# Patient Record
Sex: FEMALE | Race: WHITE | Employment: PART TIME | URBAN - NONMETROPOLITAN AREA
[De-identification: names, ages, dates, MRNs, and addresses within clinical notes are randomized per-mention and may not be internally consistent; named-entity substitution may affect disease eponyms.]

---

## 2017-01-07 DIAGNOSIS — Z30.09 FAMILY PLANNING ADVICE: ICD-10-CM

## 2017-01-09 RX ORDER — NORETHINDRONE ACETATE AND ETHINYL ESTRADIOL 1MG-20(21)
KIT ORAL
Qty: 28 TABLET | Refills: 0 | Status: SHIPPED | OUTPATIENT
Start: 2017-01-09 | End: 2017-03-11

## 2017-01-25 ENCOUNTER — OFFICE VISIT (OUTPATIENT)
Dept: PRIMARY CARE CLINIC | Age: 22
End: 2017-01-25

## 2017-01-25 VITALS
RESPIRATION RATE: 18 BRPM | DIASTOLIC BLOOD PRESSURE: 80 MMHG | SYSTOLIC BLOOD PRESSURE: 110 MMHG | WEIGHT: 215 LBS | HEART RATE: 79 BPM | HEIGHT: 63 IN | OXYGEN SATURATION: 98 % | BODY MASS INDEX: 38.09 KG/M2 | TEMPERATURE: 99.3 F

## 2017-01-25 DIAGNOSIS — R05.9 COUGH: ICD-10-CM

## 2017-01-25 DIAGNOSIS — J32.9 SINUSITIS, UNSPECIFIED CHRONICITY, UNSPECIFIED LOCATION: Primary | ICD-10-CM

## 2017-01-25 PROCEDURE — 99213 OFFICE O/P EST LOW 20 MIN: CPT | Performed by: NURSE PRACTITIONER

## 2017-01-25 RX ORDER — FLUTICASONE PROPIONATE 50 MCG
2 SPRAY, SUSPENSION (ML) NASAL DAILY
Qty: 1 BOTTLE | Refills: 0 | Status: SHIPPED | OUTPATIENT
Start: 2017-01-25 | End: 2017-10-06 | Stop reason: ALTCHOICE

## 2017-01-25 ASSESSMENT — ENCOUNTER SYMPTOMS
SORE THROAT: 1
SHORTNESS OF BREATH: 1
RHINORRHEA: 1
WHEEZING: 1
COUGH: 1

## 2017-01-31 ENCOUNTER — OFFICE VISIT (OUTPATIENT)
Dept: PRIMARY CARE CLINIC | Age: 22
End: 2017-01-31

## 2017-01-31 VITALS
SYSTOLIC BLOOD PRESSURE: 122 MMHG | OXYGEN SATURATION: 95 % | RESPIRATION RATE: 16 BRPM | TEMPERATURE: 98.8 F | DIASTOLIC BLOOD PRESSURE: 88 MMHG | HEART RATE: 101 BPM | HEIGHT: 63 IN | BODY MASS INDEX: 38.55 KG/M2 | WEIGHT: 217.6 LBS

## 2017-01-31 DIAGNOSIS — I82.4Z2 ACUTE DEEP VEIN THROMBOSIS (DVT) OF DISTAL VEIN OF LEFT LOWER EXTREMITY (HCC): Primary | ICD-10-CM

## 2017-01-31 DIAGNOSIS — M79.662 PAIN OF LEFT CALF: ICD-10-CM

## 2017-01-31 DIAGNOSIS — M79.89 SWELLING OF CALF: ICD-10-CM

## 2017-01-31 PROCEDURE — 99214 OFFICE O/P EST MOD 30 MIN: CPT | Performed by: NURSE PRACTITIONER

## 2017-01-31 ASSESSMENT — ENCOUNTER SYMPTOMS: RESPIRATORY NEGATIVE: 1

## 2017-02-03 ENCOUNTER — OFFICE VISIT (OUTPATIENT)
Dept: FAMILY MEDICINE CLINIC | Age: 22
End: 2017-02-03

## 2017-02-03 VITALS
DIASTOLIC BLOOD PRESSURE: 84 MMHG | SYSTOLIC BLOOD PRESSURE: 124 MMHG | BODY MASS INDEX: 36.25 KG/M2 | HEIGHT: 63 IN | HEART RATE: 68 BPM | WEIGHT: 204.6 LBS | RESPIRATION RATE: 20 BRPM

## 2017-02-03 DIAGNOSIS — I82.432 ACUTE DEEP VEIN THROMBOSIS (DVT) OF POPLITEAL VEIN OF LEFT LOWER EXTREMITY (HCC): Primary | ICD-10-CM

## 2017-02-03 PROCEDURE — 99214 OFFICE O/P EST MOD 30 MIN: CPT | Performed by: FAMILY MEDICINE

## 2017-02-03 ASSESSMENT — ENCOUNTER SYMPTOMS
SHORTNESS OF BREATH: 0
EYES NEGATIVE: 1
RESPIRATORY NEGATIVE: 1
ALLERGIC/IMMUNOLOGIC NEGATIVE: 1
GASTROINTESTINAL NEGATIVE: 1

## 2017-02-20 ENCOUNTER — TELEPHONE (OUTPATIENT)
Dept: FAMILY MEDICINE CLINIC | Age: 22
End: 2017-02-20

## 2017-03-01 DIAGNOSIS — M79.662 PAIN OF LEFT CALF: ICD-10-CM

## 2017-03-01 DIAGNOSIS — M79.89 SWELLING OF CALF: ICD-10-CM

## 2017-03-01 DIAGNOSIS — I82.4Z2 ACUTE DEEP VEIN THROMBOSIS (DVT) OF DISTAL VEIN OF LEFT LOWER EXTREMITY (HCC): ICD-10-CM

## 2017-03-11 ENCOUNTER — OFFICE VISIT (OUTPATIENT)
Dept: PRIMARY CARE CLINIC | Age: 22
End: 2017-03-11
Payer: MEDICAID

## 2017-03-11 VITALS
BODY MASS INDEX: 39.51 KG/M2 | DIASTOLIC BLOOD PRESSURE: 68 MMHG | WEIGHT: 223 LBS | SYSTOLIC BLOOD PRESSURE: 118 MMHG | OXYGEN SATURATION: 97 % | HEART RATE: 91 BPM | HEIGHT: 63 IN | TEMPERATURE: 98.2 F

## 2017-03-11 DIAGNOSIS — G44.201 ACUTE INTRACTABLE TENSION-TYPE HEADACHE: Primary | ICD-10-CM

## 2017-03-11 PROCEDURE — 99213 OFFICE O/P EST LOW 20 MIN: CPT | Performed by: NURSE PRACTITIONER

## 2017-03-11 ASSESSMENT — ENCOUNTER SYMPTOMS
PHOTOPHOBIA: 1
RESPIRATORY NEGATIVE: 1
BLURRED VISION: 1
NAUSEA: 1

## 2017-06-05 ENCOUNTER — OFFICE VISIT (OUTPATIENT)
Dept: PRIMARY CARE CLINIC | Age: 22
End: 2017-06-05
Payer: MEDICAID

## 2017-06-05 VITALS
HEART RATE: 62 BPM | HEIGHT: 63 IN | OXYGEN SATURATION: 98 % | TEMPERATURE: 97.9 F | BODY MASS INDEX: 39.69 KG/M2 | DIASTOLIC BLOOD PRESSURE: 70 MMHG | WEIGHT: 224 LBS | SYSTOLIC BLOOD PRESSURE: 110 MMHG

## 2017-06-05 DIAGNOSIS — R42 LIGHTHEADEDNESS: ICD-10-CM

## 2017-06-05 DIAGNOSIS — J06.9 VIRAL UPPER RESPIRATORY TRACT INFECTION: ICD-10-CM

## 2017-06-05 DIAGNOSIS — J32.4 CHRONIC PANSINUSITIS: Primary | ICD-10-CM

## 2017-06-05 PROCEDURE — 99213 OFFICE O/P EST LOW 20 MIN: CPT | Performed by: PHYSICIAN ASSISTANT

## 2017-06-05 RX ORDER — AMOXICILLIN 875 MG/1
875 TABLET, COATED ORAL 2 TIMES DAILY
Qty: 28 TABLET | Refills: 0 | Status: SHIPPED | OUTPATIENT
Start: 2017-06-05 | End: 2017-08-21 | Stop reason: ALTCHOICE

## 2017-06-05 RX ORDER — FLUCONAZOLE 200 MG/1
TABLET ORAL
Qty: 2 TABLET | Refills: 1 | Status: SHIPPED | OUTPATIENT
Start: 2017-06-05 | End: 2017-08-21 | Stop reason: ALTCHOICE

## 2017-06-05 ASSESSMENT — ENCOUNTER SYMPTOMS
NAUSEA: 1
DIARRHEA: 0
VOMITING: 0
SORE THROAT: 0
SINUS PRESSURE: 1
SINUS COMPLAINT: 1
SHORTNESS OF BREATH: 0
COUGH: 0

## 2017-06-29 ENCOUNTER — TELEPHONE (OUTPATIENT)
Dept: FAMILY MEDICINE CLINIC | Age: 22
End: 2017-06-29

## 2017-08-21 ENCOUNTER — OFFICE VISIT (OUTPATIENT)
Dept: FAMILY MEDICINE CLINIC | Age: 22
End: 2017-08-21
Payer: MEDICAID

## 2017-08-21 VITALS
HEART RATE: 68 BPM | BODY MASS INDEX: 39.69 KG/M2 | WEIGHT: 223.99 LBS | DIASTOLIC BLOOD PRESSURE: 68 MMHG | SYSTOLIC BLOOD PRESSURE: 118 MMHG | HEIGHT: 63 IN

## 2017-08-21 DIAGNOSIS — I82.4Z2 ACUTE DEEP VEIN THROMBOSIS (DVT) OF DISTAL VEIN OF LEFT LOWER EXTREMITY (HCC): ICD-10-CM

## 2017-08-21 DIAGNOSIS — L40.9 PSORIASIS: ICD-10-CM

## 2017-08-21 DIAGNOSIS — Z11.51 SCREENING FOR HUMAN PAPILLOMAVIRUS: Primary | ICD-10-CM

## 2017-08-21 DIAGNOSIS — N92.6 ABNORMAL MENSES: ICD-10-CM

## 2017-08-21 PROCEDURE — 99214 OFFICE O/P EST MOD 30 MIN: CPT | Performed by: FAMILY MEDICINE

## 2017-08-21 RX ORDER — CLOBETASOL PROPIONATE 0.5 MG/G
OINTMENT TOPICAL
Qty: 60 G | Refills: 1 | Status: SHIPPED | OUTPATIENT
Start: 2017-08-21 | End: 2018-04-16 | Stop reason: ALTCHOICE

## 2017-08-21 RX ORDER — MECLIZINE HCL 12.5 MG/1
12.5 TABLET ORAL 3 TIMES DAILY PRN
COMMUNITY
End: 2017-11-27

## 2017-08-21 ASSESSMENT — ENCOUNTER SYMPTOMS
EYES NEGATIVE: 1
SHORTNESS OF BREATH: 0
ALLERGIC/IMMUNOLOGIC NEGATIVE: 1
RESPIRATORY NEGATIVE: 1
GASTROINTESTINAL NEGATIVE: 1

## 2017-08-21 ASSESSMENT — PATIENT HEALTH QUESTIONNAIRE - PHQ9
2. FEELING DOWN, DEPRESSED OR HOPELESS: 0
SUM OF ALL RESPONSES TO PHQ9 QUESTIONS 1 & 2: 0
SUM OF ALL RESPONSES TO PHQ QUESTIONS 1-9: 0
1. LITTLE INTEREST OR PLEASURE IN DOING THINGS: 0

## 2017-08-25 ENCOUNTER — HOSPITAL ENCOUNTER (EMERGENCY)
Age: 22
Discharge: HOME | End: 2017-08-26
Attending: EMERGENCY MEDICINE
Payer: MEDICAID

## 2017-08-25 DIAGNOSIS — M79.605 PAIN OF LEFT LOWER EXTREMITY: Primary | ICD-10-CM

## 2017-08-25 PROCEDURE — 99283 EMERGENCY DEPT VISIT LOW MDM: CPT

## 2017-08-25 ASSESSMENT — PAIN DESCRIPTION - LOCATION: LOCATION: LEG

## 2017-08-25 ASSESSMENT — PAIN DESCRIPTION - PAIN TYPE: TYPE: ACUTE PAIN

## 2017-08-25 ASSESSMENT — PAIN SCALES - GENERAL: PAINLEVEL_OUTOF10: 6

## 2017-08-25 ASSESSMENT — PAIN DESCRIPTION - DESCRIPTORS: DESCRIPTORS: SHARP

## 2017-08-25 ASSESSMENT — PAIN DESCRIPTION - PROGRESSION: CLINICAL_PROGRESSION: NOT CHANGED

## 2017-08-25 ASSESSMENT — PAIN DESCRIPTION - ONSET: ONSET: ON-GOING

## 2017-08-25 ASSESSMENT — PAIN DESCRIPTION - ORIENTATION: ORIENTATION: LEFT

## 2017-08-25 ASSESSMENT — PAIN DESCRIPTION - FREQUENCY: FREQUENCY: INTERMITTENT

## 2017-08-26 VITALS
DIASTOLIC BLOOD PRESSURE: 79 MMHG | OXYGEN SATURATION: 98 % | TEMPERATURE: 97.5 F | RESPIRATION RATE: 13 BRPM | BODY MASS INDEX: 38.24 KG/M2 | SYSTOLIC BLOOD PRESSURE: 110 MMHG | HEART RATE: 66 BPM | WEIGHT: 224 LBS | HEIGHT: 64 IN

## 2017-08-26 LAB
ABSOLUTE EOS #: 0.1 K/UL (ref 0–0.4)
ABSOLUTE LYMPH #: 4.4 K/UL (ref 1–4.8)
ABSOLUTE MONO #: 0.7 K/UL (ref 0.1–1.2)
ANION GAP SERPL CALCULATED.3IONS-SCNC: 15 MMOL/L (ref 9–17)
BASOPHILS # BLD: 1 %
BASOPHILS ABSOLUTE: 0.1 K/UL (ref 0–0.2)
BUN BLDV-MCNC: 21 MG/DL (ref 6–20)
BUN/CREAT BLD: 32 (ref 9–20)
CALCIUM SERPL-MCNC: 9.6 MG/DL (ref 8.6–10.4)
CHLORIDE BLD-SCNC: 101 MMOL/L (ref 98–107)
CO2: 26 MMOL/L (ref 20–31)
CREAT SERPL-MCNC: 0.65 MG/DL (ref 0.5–0.9)
DIFFERENTIAL TYPE: NORMAL
EOSINOPHILS RELATIVE PERCENT: 1 %
GFR AFRICAN AMERICAN: >60 ML/MIN
GFR NON-AFRICAN AMERICAN: >60 ML/MIN
GFR SERPL CREATININE-BSD FRML MDRD: ABNORMAL ML/MIN/{1.73_M2}
GFR SERPL CREATININE-BSD FRML MDRD: ABNORMAL ML/MIN/{1.73_M2}
GLUCOSE BLD-MCNC: 90 MG/DL (ref 70–99)
HCT VFR BLD CALC: 44.4 % (ref 36–46)
HEMOGLOBIN: 14.7 G/DL (ref 12–16)
LYMPHOCYTES # BLD: 42 %
MCH RBC QN AUTO: 29.7 PG (ref 26–34)
MCHC RBC AUTO-ENTMCNC: 33.2 G/DL (ref 31–37)
MCV RBC AUTO: 89.2 FL (ref 80–100)
MONOCYTES # BLD: 7 %
PDW BLD-RTO: 13 % (ref 11–14.5)
PLATELET # BLD: 292 K/UL (ref 140–450)
PLATELET ESTIMATE: NORMAL
PMV BLD AUTO: 7.4 FL (ref 6–12)
POTASSIUM SERPL-SCNC: 4 MMOL/L (ref 3.7–5.3)
RBC # BLD: 4.97 M/UL (ref 4–5.2)
RBC # BLD: NORMAL 10*6/UL
SEG NEUTROPHILS: 49 %
SEGMENTED NEUTROPHILS ABSOLUTE COUNT: 5.1 K/UL (ref 1.8–7.7)
SODIUM BLD-SCNC: 142 MMOL/L (ref 135–144)
WBC # BLD: 10.5 K/UL (ref 3.5–11)
WBC # BLD: NORMAL 10*3/UL

## 2017-08-26 PROCEDURE — 96372 THER/PROPH/DIAG INJ SC/IM: CPT

## 2017-08-26 PROCEDURE — 85025 COMPLETE CBC W/AUTO DIFF WBC: CPT

## 2017-08-26 PROCEDURE — 36415 COLL VENOUS BLD VENIPUNCTURE: CPT

## 2017-08-26 PROCEDURE — 80048 BASIC METABOLIC PNL TOTAL CA: CPT

## 2017-08-26 PROCEDURE — 6360000002 HC RX W HCPCS

## 2017-08-26 RX ADMIN — ENOXAPARIN SODIUM 150 MG: 100 INJECTION SUBCUTANEOUS at 01:21

## 2017-08-26 ASSESSMENT — ENCOUNTER SYMPTOMS
VOMITING: 0
SHORTNESS OF BREATH: 0
NAUSEA: 0

## 2017-08-28 ENCOUNTER — TELEPHONE (OUTPATIENT)
Dept: FAMILY MEDICINE CLINIC | Age: 22
End: 2017-08-28

## 2017-08-28 ENCOUNTER — HOSPITAL ENCOUNTER (OUTPATIENT)
Dept: INTERVENTIONAL RADIOLOGY/VASCULAR | Age: 22
Discharge: HOME | End: 2017-08-28
Payer: MEDICAID

## 2017-08-28 DIAGNOSIS — M79.605 LEFT LEG PAIN: ICD-10-CM

## 2017-08-28 PROCEDURE — 93971 EXTREMITY STUDY: CPT

## 2017-09-05 ENCOUNTER — HOSPITAL ENCOUNTER (OUTPATIENT)
Dept: LAB | Age: 22
Discharge: HOME | End: 2017-09-05
Payer: MEDICAID

## 2017-09-05 RX ORDER — RIVAROXABAN 20 MG/1
TABLET, FILM COATED ORAL
Qty: 30 TABLET | Refills: 4 | Status: SHIPPED | OUTPATIENT
Start: 2017-09-05 | End: 2017-10-06 | Stop reason: ALTCHOICE

## 2017-09-06 ENCOUNTER — HOSPITAL ENCOUNTER (OUTPATIENT)
Dept: LAB | Age: 22
Discharge: HOME | End: 2017-09-06
Payer: MEDICAID

## 2017-09-06 DIAGNOSIS — I82.419 DVT FEMORAL (DEEP VENOUS THROMBOSIS) WITH THROMBOPHLEBITIS, UNSPECIFIED LATERALITY (HCC): Primary | ICD-10-CM

## 2017-09-06 DIAGNOSIS — I82.419 DEEP VEIN THROMBOSIS (DVT) OF FEMORAL VEIN, UNSPECIFIED CHRONICITY, UNSPECIFIED LATERALITY (HCC): ICD-10-CM

## 2017-09-06 DIAGNOSIS — I82.419 DEEP VEIN THROMBOSIS (DVT) OF FEMORAL VEIN, UNSPECIFIED CHRONICITY, UNSPECIFIED LATERALITY (HCC): Primary | ICD-10-CM

## 2017-09-06 DIAGNOSIS — I82.419 DVT FEMORAL (DEEP VENOUS THROMBOSIS) WITH THROMBOPHLEBITIS, UNSPECIFIED LATERALITY (HCC): ICD-10-CM

## 2017-09-06 PROCEDURE — 85306 CLOT INHIBIT PROT S FREE: CPT

## 2017-09-06 PROCEDURE — 81291 MTHFR GENE: CPT

## 2017-09-06 PROCEDURE — 86148 ANTI-PHOSPHOLIPID ANTIBODY: CPT

## 2017-09-06 PROCEDURE — 36415 COLL VENOUS BLD VENIPUNCTURE: CPT

## 2017-09-06 PROCEDURE — 85610 PROTHROMBIN TIME: CPT

## 2017-09-06 PROCEDURE — 85730 THROMBOPLASTIN TIME PARTIAL: CPT

## 2017-09-06 PROCEDURE — 81241 F5 GENE: CPT

## 2017-09-06 PROCEDURE — 86147 CARDIOLIPIN ANTIBODY EA IG: CPT

## 2017-09-06 PROCEDURE — 85303 CLOT INHIBIT PROT C ACTIVITY: CPT

## 2017-09-06 PROCEDURE — 85613 RUSSELL VIPER VENOM DILUTED: CPT

## 2017-09-06 PROCEDURE — 86146 BETA-2 GLYCOPROTEIN ANTIBODY: CPT

## 2017-09-08 ENCOUNTER — HOSPITAL ENCOUNTER (OUTPATIENT)
Age: 22
Setting detail: SPECIMEN
Discharge: HOME | End: 2017-09-08
Payer: MEDICAID

## 2017-09-08 LAB
ANTI B2-GLYCOPROTEIN IGG: 0 SGU (ref 0–20)
ANTI B2-GLYCOPROTEIN IGM: 2 SMU (ref 0–20)
ANTICARDIOLIPIN IGA ANTIBODY: 1.6 APU
ANTICARDIOLIPIN IGG ANTIBODY: 8.2 GPU
CARDIOLIPIN AB IGM: 5.8 MPU
CREATININE URINE: 294.7 MG/DL (ref 28–217)
DILUTE RUSSELL VIPER VENOM TIME: NORMAL
INR BLD: 1
LUPUS ANTICOAG: NORMAL
MICROALBUMIN/CREAT 24H UR: 43 MG/L
MICROALBUMIN/CREAT UR-RTO: 15 MCG/MG CREAT
PARTIAL THROMBOPLASTIN TIME: 23.7 SEC (ref 21.3–31.3)
PROTEIN C ACTIVITY: >150 %
PROTEIN S ACTIVITY: 117 % (ref 59–130)
PROTHROMBIN TIME: 10.3 SEC (ref 9.4–12.6)

## 2017-09-08 PROCEDURE — 82043 UR ALBUMIN QUANTITATIVE: CPT

## 2017-09-08 PROCEDURE — 82570 ASSAY OF URINE CREATININE: CPT

## 2017-09-09 LAB
ANTIPHOSPHATIDYLSERINE IGA ANTIBODY: 0 U/ML (ref 0–19)
ANTIPHOSPHATIDYLSERINE IGG ANTIBODY: 1 U/ML (ref 0–10)
ANTIPHOSPHATIDYLSERINE IGM ANTIBODY: 6 U/ML (ref 0–24)

## 2017-09-15 LAB
FACTOR V LEIDEN MUTATION: NORMAL
MTHFR MUTATION 677T/A1298C: NORMAL

## 2017-10-02 ENCOUNTER — TELEPHONE (OUTPATIENT)
Dept: FAMILY MEDICINE CLINIC | Age: 22
End: 2017-10-02

## 2017-10-02 NOTE — TELEPHONE ENCOUNTER
Pt calling stating she was notified of lab results but was told we would get back with her with more information regarding the results, pt also questioning if all results are back, please advise pt at above number.

## 2017-10-02 NOTE — TELEPHONE ENCOUNTER
Heterozygous for the MTHFR  Mutation , otherwise hypercoagulable work up negative. Not a reason to consider lifelong anticoagulation , but slightly higher risk for blood clots. May take asa 81mg/day.

## 2017-10-04 ENCOUNTER — HOSPITAL ENCOUNTER (EMERGENCY)
Age: 22
Discharge: HOME | End: 2017-10-04
Attending: EMERGENCY MEDICINE
Payer: MEDICAID

## 2017-10-04 VITALS
BODY MASS INDEX: 38.27 KG/M2 | WEIGHT: 216 LBS | HEIGHT: 63 IN | RESPIRATION RATE: 18 BRPM | TEMPERATURE: 98.2 F | OXYGEN SATURATION: 99 % | DIASTOLIC BLOOD PRESSURE: 78 MMHG | SYSTOLIC BLOOD PRESSURE: 133 MMHG | HEART RATE: 69 BPM

## 2017-10-04 DIAGNOSIS — L50.9 URTICARIA: Primary | ICD-10-CM

## 2017-10-04 PROCEDURE — 6370000000 HC RX 637 (ALT 250 FOR IP): Performed by: EMERGENCY MEDICINE

## 2017-10-04 PROCEDURE — 6360000002 HC RX W HCPCS: Performed by: EMERGENCY MEDICINE

## 2017-10-04 PROCEDURE — 99282 EMERGENCY DEPT VISIT SF MDM: CPT

## 2017-10-04 PROCEDURE — 96372 THER/PROPH/DIAG INJ SC/IM: CPT

## 2017-10-04 RX ORDER — FAMOTIDINE 20 MG/1
20 TABLET, FILM COATED ORAL ONCE
Status: COMPLETED | OUTPATIENT
Start: 2017-10-04 | End: 2017-10-04

## 2017-10-04 RX ORDER — RANITIDINE 150 MG/1
150 TABLET ORAL 2 TIMES DAILY
Qty: 10 TABLET | Refills: 0 | Status: SHIPPED | OUTPATIENT
Start: 2017-10-04 | End: 2018-04-23

## 2017-10-04 RX ORDER — PREDNISONE 20 MG/1
40 TABLET ORAL ONCE
Status: COMPLETED | OUTPATIENT
Start: 2017-10-04 | End: 2017-10-04

## 2017-10-04 RX ORDER — DIPHENHYDRAMINE HYDROCHLORIDE 50 MG/ML
50 INJECTION INTRAMUSCULAR; INTRAVENOUS ONCE
Status: COMPLETED | OUTPATIENT
Start: 2017-10-04 | End: 2017-10-04

## 2017-10-04 RX ORDER — DIPHENHYDRAMINE HCL 25 MG
50 CAPSULE ORAL EVERY 6 HOURS PRN
Qty: 20 CAPSULE | Refills: 0 | Status: SHIPPED | OUTPATIENT
Start: 2017-10-04 | End: 2017-10-08

## 2017-10-04 RX ORDER — PREDNISONE 10 MG/1
TABLET ORAL
Qty: 20 TABLET | Refills: 0 | Status: SHIPPED | OUTPATIENT
Start: 2017-10-04 | End: 2017-10-14

## 2017-10-04 RX ADMIN — FAMOTIDINE 20 MG: 20 TABLET ORAL at 21:22

## 2017-10-04 RX ADMIN — DIPHENHYDRAMINE HYDROCHLORIDE 50 MG: 50 INJECTION, SOLUTION INTRAMUSCULAR; INTRAVENOUS at 21:23

## 2017-10-04 RX ADMIN — PREDNISONE 40 MG: 20 TABLET ORAL at 21:22

## 2017-10-04 ASSESSMENT — ENCOUNTER SYMPTOMS
SHORTNESS OF BREATH: 0
NAUSEA: 0
VOMITING: 0

## 2017-10-04 NOTE — ED AVS SNAPSHOT
After Visit Summary  (Discharge Instructions)    Medication List for Home    Based on the information you provided to us as well as any changes during this visit, the following is your updated medication list.  Compare this with your prescription bottles at home. If you have any questions or concerns, contact your primary care physician's office. Daily Medication List (This medication list can be shared with any Healthcare provider who is helping you manage your medications)      There are NEW medications for you. START taking them after you leave the hospital     diphenhydrAMINE 25 MG capsule   Commonly known as:  BENADRYL   Take 2 capsules by mouth every 6 hours as needed for Itching       predniSONE 10 MG tablet   Commonly known as:  DELTASONE   Take 4 tablets by mouth once daily for 5 days       ranitidine 150 MG tablet   Commonly known as:  ZANTAC   Take 1 tablet by mouth 2 times daily for 5 days         ASK your doctor about these medications if you have questions     aspirin 81 MG tablet   Take 81 mg by mouth daily       clobetasol 0.05 % ointment   Commonly known as:  TEMOVATE   Apply topically 2 times daily to affected area.        fluticasone 50 MCG/ACT nasal spray   Commonly known as:  FLONASE   2 sprays by Nasal route daily       meclizine 12.5 MG tablet   Commonly known as:  ANTIVERT   Take 12.5 mg by mouth 3 times daily as needed       XARELTO 20 MG Tabs tablet   Generic drug:  rivaroxaban   TAKE ONE TABLET BY MOUTH DAILY            Where to Get Your Medications      You can get these medications from any pharmacy     Bring a paper prescription for each of these medications     diphenhydrAMINE 25 MG capsule    predniSONE 10 MG tablet    ranitidine 150 MG tablet               Allergies as of 10/4/2017        Reactions    Ibuprofen Nausea And Vomiting      Immunizations as of 10/4/2017     Name Date Dose VIS Date Route    DTaP Vaccine 5/7/1999 -- -- -- Here you will find information about your visit, including the reason for your visit. Please take this sheet with you when you visit your doctor or other health care provider in the future. It will help determine the best possible medical care for you at that time. If you have any questions once you leave the hospital, please call the department phone number listed below. Diagnoses this visit     Your diagnosis was URTICARIA. Visit Information     Date of Visit Department Dept Phone    10/4/2017 [facility] [phone]      You were seen by     You were seen by Manpreet Harvey MD.       Follow-up Appointments    Below is a list of your follow-up and future appointments. This may not be a complete list as you may have made appointments directly with providers that we are not aware of or your providers may have made some for you. Please call your providers to confirm appointments. It is important to keep your appointments. Please bring your current insurance card, photo ID, co-pay, and all medication bottles to your appointment. If self-pay, payment is expected at the time of service. Follow-up Information     Follow up with Macario Rao MD In 2 days. Specialty:  Family Medicine    Contact information:    [address] [address]  [phone]        Future Appointments     11/27/2017 9:00 AM     Appointment with [address], CNP at [address] [facility] ([phone])   [place] [address]         Preventive Care        Date Due    Tetanus Combination Vaccine (7 - Td) 7/11/2016    Pap Smear 11/11/2016    Yearly Flu Vaccine (1) 11/30/2017 (Originally 9/1/2017)    HIV screening is recommended for all people regardless of risk factors  aged 15-65 years at least once (lifetime) who have never been HIV tested.  12/31/2099 (Originally 5/18/2008)                 Care Plan Once You Return Home This section includes instructions you will need to follow once you leave the hospital.  Your care team will discuss these with you, so you and those caring for you know how to best care for your health needs at home. This section may also include educational information about certain health topics that may be of help to you. Important Information if you smoke or are exposed to smoking       SMOKING: QUIT SMOKING. THIS IS THE MOST IMPORTANT ACTION YOU CAN TAKE TO IMPROVE YOUR CURRENT AND FUTURE HEALTH. Call the [facility] at [place] NOW ([phone])    Smoking harms nonsmokers. When nonsmokers are around people who smoke, they absorb nicotine, carbon monoxide, and other ingredients of tobacco smoke. DO NOT SMOKE AROUND CHILDREN     Children exposed to secondhand smoke are at an increased risk of:  Sudden Infant Death Syndrome (SIDS), acute respiratory infections, inflammation of the middle ear, and severe asthma. Over a longer time, it causes heart disease and lung cancer. There is no safe level of exposure to secondhand smoke. [employer] Signup     Our records indicate that you have an active [employer] account. You can view your After Visit Summary by going to [email or web address] and logging in with your [employer] username and password. If you don't have a [employer] username and password but a parent or guardian has access to your record, the parent or guardian should login with their own [employer] username and password and access your record to view the After Visit Summary. Additional Information  If you have questions, please contact the physician practice where you receive care. Remember, [employer] is NOT to be used for urgent needs. For medical emergencies, dial 911. For questions regarding your [employer] account call [phone]. If you have a clinical question, please call your doctor's office. View your information online  ? Review your current list of  medications, immunization, and allergies. ? Review your future test results online . ? Review your discharge instructions provided by your caregivers at discharge    Certain functionality such as prescription refills, scheduling appointments or sending messages to your provider are not activated if your provider does not use [facility] in his/her office    For questions regarding your MyChart account call [phone]. If you have a clinical question, please call your doctor's office. The information on all pages of the After Visit Summary has been reviewed with me, the patient and/or responsible adult, by my health care provider(s). I had the opportunity to ask questions regarding this information. I understand I should dispose of my armband safely at home to protect my health information. A complete copy of the After Visit Summary has been given to me, the patient and/or responsible adult. Patient Signature/Responsible Adult: ___________________________________    Nurse Signature: ___________________________________  Resident/MLP Signature: ___________________________________  Attending Signature: ___________________________________    Date:____________Time:____________              Discharge Instructions       Please follow up with her family doctor within 2 days. Please avoid known allergens. Medications as prescribed. Do not drive while taking diphenhydramine. Return to the emergency department immediately for difficulty breathing or swallowing, swelling of the lips or tongue or face, hives on the face or neck, fever over 101 degrees, or any other concerns. Hives: Care Instructions  Your Care Instructions  Hives are raised, red, itchy patches of skin. They are also called wheals or welts. They usually have red borders and pale centers.  Hives range in size from ¼ inch to 3 inches or more across. They may seem to move from place to place on the skin. Several hives may form a large area of raised, red skin. You can get hives after an insect sting, after taking medicine or eating certain foods, or because of infection or stress. Other causes include plants, things you breathe in, makeup, heat, cold, sunlight, and latex. You cannot spread hives to other people. Hives may last a few minutes or a few days, but a single spot may last less than 36 hours. Follow-up care is a key part of your treatment and safety. Be sure to make and go to all appointments, and call your doctor if you are having problems. It's also a good idea to know your test results and keep a list of the medicines you take. How can you care for yourself at home? · Avoid whatever you think may have caused your hives, such as a certain food or medicine. However, you may not know the cause. · Put a cool, wet towel on the area to relieve itching. · Take an over-the-counter antihistamine, such as diphenhydramine (Benadryl), cetirizine (Zyrtec), or loratadine (Claritin), to help stop the hives and calm the itching. Read and follow directions on the label. These medicines can make you feel sleepy. Do not drive while using them. · Stay away from strong soaps, detergents, and chemicals. These can make itching worse. When should you call for help? Call 911 anytime you think you may need emergency care. For example, call if:  · You have symptoms of a severe allergic reaction. These may include:  ¨ Sudden raised, red areas (hives) all over your body. ¨ Swelling of the throat, mouth, lips, or tongue. ¨ Trouble breathing. ¨ Passing out (losing consciousness). Or you may feel very lightheaded or suddenly feel weak, confused, or restless. Call your doctor now or seek immediate medical care if:  · You have symptoms of an allergic reaction, such as:  ¨ A rash or hives (raised, red areas on the skin).

## 2017-10-05 NOTE — ED PROVIDER NOTES
[facility] ED  [address] [facility]  Phone: [phone]  eMERGENCY dEPARTMENT eNCOUnter      Pt Name: Yi Ascencio  MRN: [id number]  [place] 1995  Date of evaluation: 10/4/17      CHIEF COMPLAINT       Chief Complaint   Patient presents with    Urticaria     on bilateral arms and chest x 1 day. Reports using a new laundry soap recently. Took benadryl PTA         HISTORY OF PRESENT ILLNESS    Yi Ascencio is a 23 y.o. female who presents Today with complaints of urticaria that started this morning on her chest back abdomen. She recently changed her laundry detergent from gained all. 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 She denies any difficulty breathing or swallowing no chest pain or shortness of breath. She took some generic diphenhydramine prior to arrival.    REVIEW OF SYSTEMS     Review of Systems   Constitutional: Negative for fever. HENT: Negative for congestion. Respiratory: Negative for shortness of breath. Cardiovascular: Negative for chest pain. Gastrointestinal: Negative for nausea and vomiting. Skin: Positive for rash. Neurological: Negative for syncope. Psychiatric/Behavioral: Negative for confusion. All other systems reviewed and are negative. PAST MEDICAL HISTORY    has a past medical history of Acne; Anisocoria; Chickenpox; DVT (deep venous thrombosis) ([address] Obesity; Psoriasis; Seasonal allergies; Social anxiety disorder; and Swollen tonsil. SURGICAL HISTORY      has a past surgical history that includes [place] tooth extraction; Endoscopic ultrasonography, GI; Colonoscopy (2010); Upper gastrointestinal endoscopy (2010); and ovarian cyst removal (Right, 09/27/2016).     CURRENT MEDICATIONS       Previous Medications    ASPIRIN 81 MG TABLET    Take 81 mg by mouth daily    CLOBETASOL (TEMOVATE) intact distal pulses. No murmur heard. Pulmonary/Chest: Effort normal and breath sounds normal. No respiratory distress. She has no wheezes. She has no rales. Abdominal: Soft. There is no tenderness. There is no rebound and no guarding. Musculoskeletal: Normal range of motion. She exhibits no edema or tenderness. Neurological: She is alert and oriented to person, place, and time. No cranial nerve deficit. Skin: Skin is warm and dry. Rash noted. She is not diaphoretic. Diffuse maculopapular rash over the chest abdomen and back that is blanching consistent with urticaria. Psychiatric: She has a normal mood and affect. Her behavior is normal.   Vitals reviewed. DIFFERENTIAL DIAGNOSIS / MDM / EMERGENCY DEPARTMENT COURSE:     This appears to be a allergic reaction. There is no airway or breathing component. The patient is agreeable to injectable diphenhydramine. She prefers oral steroids and H2 blocker. We will observe the patient to make sure he is not worsening and improves prior to discharge. She will be discharged on a five-day course of 40 mg of prednisone and Pepcid and diphenhydramine. she was advised to change back to the original laundry detergent and to avoid future known allergens. 9:54 PM: patient is reassessed and is starting to feel improved. She wishes to go home. I educated on the warning signs which should return to the emergency department. DIAGNOSTIC RESULTS     EKG: All EKG's are interpreted by the Emergency Department Physician who either signs or Co-signs this chart in the absence of a cardiologist.        RADIOLOGY:   I directly visualized the following plain film images and reviewed the radiologist interpretations of radiologic studies:    No orders to display        No results found. LABS:  No results found for this visit on 10/04/17.       EMERGENCY DEPARTMENT COURSE:   Vitals:    Vitals:    10/04/17 2105   BP: 133/78   Pulse: 69   Resp: 18   Temp: 98.2 °F (36.8 °C)   TempSrc: Tympanic   SpO2: 99%   Weight: 216 lb (98 kg)   Height: 5' 3\" (1.6 m)     -------------------------  BP: 133/78, Temp: 98.2 °F (36.8 °C), Pulse: 69, Resp: 18      CONSULTS:  None    PROCEDURES:  None    FINAL IMPRESSION      1.  Urticaria          DISPOSITION/PLAN   DISPOSITION Decision to Discharge    PATIENT REFERRED TO:  Trini Tadeo MD  [address] [place], [place] [id number]    In 2 days        DISCHARGE MEDICATIONS:  New Prescriptions    DIPHENHYDRAMINE (BENADRYL) 25 MG CAPSULE    Take 2 capsules by mouth every 6 hours as needed for Itching    PREDNISONE (DELTASONE) 10 MG TABLET    Take 4 tablets by mouth once daily for 5 days    RANITIDINE (ZANTAC) 150 MG TABLET    Take 1 tablet by mouth 2 times daily for 5 days       (Please note that portions of this note were completed with a voice recognition program.  Efforts were made to edit the dictations but occasionally words are mis-transcribed.)    Ana Cristina Wang MD, [address] [facility]  Attending Emergency Medicine Physician       Ana Cristina Wang MD  10/04/17 [id number]

## 2017-10-06 ENCOUNTER — HOSPITAL ENCOUNTER (EMERGENCY)
Age: 22
Discharge: HOME | End: 2017-10-06
Attending: EMERGENCY MEDICINE
Payer: MEDICAID

## 2017-10-06 ENCOUNTER — HOSPITAL ENCOUNTER (EMERGENCY)
Age: 22
Discharge: HOME | End: 2017-10-06
Attending: SPECIALIST
Payer: MEDICAID

## 2017-10-06 VITALS
WEIGHT: 216 LBS | SYSTOLIC BLOOD PRESSURE: 138 MMHG | RESPIRATION RATE: 12 BRPM | HEART RATE: 88 BPM | OXYGEN SATURATION: 97 % | TEMPERATURE: 97.3 F | HEIGHT: 63 IN | BODY MASS INDEX: 38.27 KG/M2 | DIASTOLIC BLOOD PRESSURE: 79 MMHG

## 2017-10-06 VITALS
DIASTOLIC BLOOD PRESSURE: 90 MMHG | HEART RATE: 70 BPM | BODY MASS INDEX: 38.27 KG/M2 | TEMPERATURE: 98.2 F | OXYGEN SATURATION: 98 % | WEIGHT: 216 LBS | HEIGHT: 63 IN | RESPIRATION RATE: 16 BRPM | SYSTOLIC BLOOD PRESSURE: 140 MMHG

## 2017-10-06 DIAGNOSIS — T50.905A MEDICATION SIDE EFFECT, INITIAL ENCOUNTER: Primary | ICD-10-CM

## 2017-10-06 DIAGNOSIS — L50.9 URTICARIA: Primary | ICD-10-CM

## 2017-10-06 PROCEDURE — 96372 THER/PROPH/DIAG INJ SC/IM: CPT

## 2017-10-06 PROCEDURE — 6370000000 HC RX 637 (ALT 250 FOR IP): Performed by: SPECIALIST

## 2017-10-06 PROCEDURE — 6360000002 HC RX W HCPCS: Performed by: SPECIALIST

## 2017-10-06 PROCEDURE — 99283 EMERGENCY DEPT VISIT LOW MDM: CPT

## 2017-10-06 PROCEDURE — 6360000002 HC RX W HCPCS: Performed by: EMERGENCY MEDICINE

## 2017-10-06 RX ORDER — METHYLPREDNISOLONE SODIUM SUCCINATE 125 MG/2ML
125 INJECTION, POWDER, LYOPHILIZED, FOR SOLUTION INTRAMUSCULAR; INTRAVENOUS ONCE
Status: COMPLETED | OUTPATIENT
Start: 2017-10-06 | End: 2017-10-06

## 2017-10-06 RX ORDER — DIPHENHYDRAMINE HYDROCHLORIDE 50 MG/ML
50 INJECTION INTRAMUSCULAR; INTRAVENOUS ONCE
Status: COMPLETED | OUTPATIENT
Start: 2017-10-06 | End: 2017-10-06

## 2017-10-06 RX ORDER — FAMOTIDINE 20 MG/1
20 TABLET, FILM COATED ORAL 2 TIMES DAILY
Qty: 10 TABLET | Refills: 0 | Status: SHIPPED | OUTPATIENT
Start: 2017-10-06 | End: 2018-04-23

## 2017-10-06 RX ORDER — FAMOTIDINE 20 MG/1
20 TABLET, FILM COATED ORAL ONCE
Status: COMPLETED | OUTPATIENT
Start: 2017-10-06 | End: 2017-10-06

## 2017-10-06 RX ADMIN — DIPHENHYDRAMINE HYDROCHLORIDE 50 MG: 50 INJECTION, SOLUTION INTRAMUSCULAR; INTRAVENOUS at 03:46

## 2017-10-06 RX ADMIN — EPINEPHRINE 0.3 MG: 1 INJECTION INTRAMUSCULAR; INTRAVENOUS; SUBCUTANEOUS at 12:35

## 2017-10-06 RX ADMIN — FAMOTIDINE 20 MG: 20 TABLET ORAL at 03:42

## 2017-10-06 RX ADMIN — METHYLPREDNISOLONE SODIUM SUCCINATE 125 MG: 125 INJECTION, POWDER, FOR SOLUTION INTRAMUSCULAR; INTRAVENOUS at 03:46

## 2017-10-06 ASSESSMENT — ENCOUNTER SYMPTOMS
COLOR CHANGE: 1
SHORTNESS OF BREATH: 0
WHEEZING: 0

## 2017-10-06 NOTE — ED AVS SNAPSHOT
problems. It's also a good idea to know your test results and keep a list of the medicines you take. How can you care for yourself at home? · Be safe with medicines. Take your medicines exactly as prescribed. Call your doctor if you think you are having a problem with your medicine. · Call your doctor if side effects bother you and you wonder if you should keep taking a medicine. Your doctor may be able to lower your dose or change your medicine. Do not suddenly quit taking your medicine unless a doctor tells you to. · Make sure your doctor has a list of all the medicines, vitamins, supplements, and herbal remedies you take. Ask about side effects. When should you call for help? Watch closely for changes in your health, and be sure to contact your doctor if:  · You think you are having a new problem with your medicine. · You do not get better as expected. Where can you learn more? Go to [email or web address] org and sign in to your [employer] account. Enter D152 in the [employer] box to learn more about \"Side Effects of Medicine: Care Instructions. \"     If you do not have an account, please click on the \"Sign Up Now\" link. Current as of: March 24, 2017  Content Version: 11.3  © 5525-9301 [employer]. Care instructions adapted under license by [facility] ([facility]). If you have questions about a medical condition or this instruction, always ask your healthcare professional. [place] [address] any warranty or liability for your use of this information. Continue antihistamines and steroids as directed. See your doctor to follow-up. Return for difficulty breathing or swallowing, worsening hives, swelling, or if worse in any way.

## 2017-10-06 NOTE — ED AVS SNAPSHOT
After Visit Summary  (Discharge Instructions)    Medication List for Home    Based on the information you provided to us as well as any changes during this visit, the following is your updated medication list.  Compare this with your prescription bottles at home. If you have any questions or concerns, contact your primary care physician's office. Daily Medication List (This medication list can be shared with any Healthcare provider who is helping you manage your medications)      There are NEW medications for you. START taking them after you leave the hospital     famotidine 20 MG tablet   Commonly known as:  PEPCID   Take 1 tablet by mouth 2 times daily for 10 doses         These are medications you told us you were taking at home, CONTINUE taking them after you leave the hospital     aspirin 81 MG tablet   Take 81 mg by mouth daily       clobetasol 0.05 % ointment   Commonly known as:  TEMOVATE   Apply topically 2 times daily to affected area.        diphenhydrAMINE 25 MG capsule   Commonly known as:  BENADRYL   Take 2 capsules by mouth every 6 hours as needed for Itching       meclizine 12.5 MG tablet   Commonly known as:  ANTIVERT   Take 12.5 mg by mouth 3 times daily as needed       predniSONE 10 MG tablet   Commonly known as:  DELTASONE   Take 4 tablets by mouth once daily for 5 days       ranitidine 150 MG tablet   Commonly known as:  ZANTAC   Take 1 tablet by mouth 2 times daily for 5 days            Where to Get Your Medications      You can get these medications from any pharmacy     Bring a paper prescription for each of these medications     famotidine 20 MG tablet               Allergies as of 10/6/2017        Reactions    Ibuprofen Nausea And Vomiting      Immunizations as of 10/6/2017     Name Date Dose VIS Date Route    DTaP Vaccine 5/7/1999 -- -- --    DTaP Vaccine 11/18/1994 -- -- --    DTaP Vaccine 1993 -- -- --    DTaP Vaccine 1993 -- -- -- DTaP Vaccine 1993 -- -- --    HPV Gardasil 9-valent 2/10/2011 -- -- --    HPV Gardasil 9-valent 10/11/2010 -- -- --    HPV Gardasil 9-valent 8/10/2010 -- -- --    Hepatitis B (Engerix-B) 3/21/1994 -- -- --    Hepatitis B (Engerix-B) 1993 -- -- --    Hepatitis B (Engerix-B) 1993 -- -- --    Hib PRP-OMP (PedvaxHIB) 1994 -- -- --    Hib PRP-OMP (PedvaxHIB) 1993 -- -- --    Hib PRP-OMP (PedvaxHIB) 1993 -- -- --    Hib PRP-OMP (PedvaxHIB) 1993 -- -- --    IPV (Ipol) 1999 -- -- --    IPV (Ipol) 1994 -- -- --    IPV (Ipol) 1993 -- -- --    IPV (Ipol) 1993 -- -- --    MMR 1999 -- -- --    MMR 1994 -- -- --    Tdap (Boostrix, Adacel) 2006 -- -- --         After Visit Summary    This summary was created for you. Thank you for entrusting your care to us. The following information includes details about your hospital/visit stay along with steps you should take to help with your recovery once you leave the hospital.  In this packet, you will find information about the topics listed below:    · Instructions about your medications including a list of your home medications  · A summary of your hospital visit  · Follow-up appointments once you have left the hospital  · Your care plan at home      You may receive a survey regarding the care you received during your stay. Your input is valuable to us. We encourage you to complete and return your survey in the envelope provided. We hope you will choose us in the future for your healthcare needs. Patient Information     Patient Name JUAN Poe 1995      Care Provided at:     Name Address Phone       [facility] [address] [place] [phone]            Your Visit    Here you will find information about your visit, including the reason for your visit.   Please take this sheet with you when you visit your doctor or other health care provider in the future. It will help determine the best possible medical care for you at that time. If you have any questions once you leave the hospital, please call the department phone number listed below. Diagnoses this visit     Your diagnosis was URTICARIA. Visit Information     Date of Visit Department Dept Phone    10/6/2017 [facility] [phone]      You were seen by     You were seen by Tha Mujica MD.       Follow-up Appointments    Below is a list of your follow-up and future appointments. This may not be a complete list as you may have made appointments directly with providers that we are not aware of or your providers may have made some for you. Please call your providers to confirm appointments. It is important to keep your appointments. Please bring your current insurance card, photo ID, co-pay, and all medication bottles to your appointment. If self-pay, payment is expected at the time of service. Follow-up Information     Follow up with Maite Holt MD. Call in 2 days. Specialty:  Family Medicine    Why:  For reevaluation of current symptoms    Contact information:    [address] [place]  [phone]          Follow up with [facility] ED. Specialty:  Emergency Medicine    Why:  If symptoms worsen    Contact information:    [address]  [phone]      Future Appointments     11/27/2017 9:00 AM     Appointment with Romina Bennett CNP at [address] [facility] ([phone])   [facility]         Preventive Care        Date Due    Tetanus Combination Vaccine (7 - Td) 7/11/2016    Pap Smear 11/11/2016    Yearly Flu Vaccine (1) 11/30/2017 (Originally 9/1/2017)    HIV screening is recommended for all people regardless of risk factors  aged 15-65 years at least once (lifetime) who have never been HIV tested.  12/31/2099 (Originally 5/18/2008) Care Plan Once You Return Home    This section includes instructions you will need to follow once you leave the hospital.  Your care team will discuss these with you, so you and those caring for you know how to best care for your health needs at home. This section may also include educational information about certain health topics that may be of help to you. Important Information if you smoke or are exposed to smoking       SMOKING: QUIT SMOKING. THIS IS THE MOST IMPORTANT ACTION YOU CAN TAKE TO IMPROVE YOUR CURRENT AND FUTURE HEALTH. Call the [facility] [place] NOW ([phone])    Smoking harms nonsmokers. When nonsmokers are around people who smoke, they absorb nicotine, carbon monoxide, and other ingredients of tobacco smoke. DO NOT SMOKE AROUND CHILDREN     Children exposed to secondhand smoke are at an increased risk of:  Sudden Infant Death Syndrome (SIDS), acute respiratory infections, inflammation of the middle ear, and severe asthma. Over a longer time, it causes heart disease and lung cancer. There is no safe level of exposure to secondhand smoke. Tripware Signup     Our records indicate that you have an active Tripware account. You can view your After Visit Summary by going to [email or web address] and logging in with your Tripware username and password. If you don't have a Tripware username and password but a parent or guardian has access to your record, the parent or guardian should login with their own Tripware username and password and access your record to view the After Visit Summary. Additional Information  If you have questions, please contact the physician practice where you receive care. Remember, Tripware is NOT to be used for urgent needs. For medical emergencies, dial 911. For questions regarding your Tripware account call [phone].  If you have a clinical question, please call your doctor's office. View your information online  ? Review your current list of  medications, immunization, and allergies. ? Review your future test results online . ? Review your discharge instructions provided by your caregivers at discharge    Certain functionality such as prescription refills, scheduling appointments or sending messages to your provider are not activated if your provider does not use CarePATH in his/her office    For questions regarding your MyChart account call [phone]. If you have a clinical question, please call your doctor's office. The information on all pages of the After Visit Summary has been reviewed with me, the patient and/or responsible adult, by my health care provider(s). I had the opportunity to ask questions regarding this information. I understand I should dispose of my armband safely at home to protect my health information. A complete copy of the After Visit Summary has been given to me, the patient and/or responsible adult. Patient Signature/Responsible Adult: ___________________________________    Nurse Signature: ___________________________________  Resident/MLP Signature: ___________________________________  Attending Signature: ___________________________________    Date:____________Time:____________              Discharge Instructions            Hives: Care Instructions  Your Care Instructions  Hives are raised, red, itchy patches of skin. They are also called wheals or welts. They usually have red borders and pale centers. Hives range in size from ¼ inch to 3 inches or more across. They may seem to move from place to place on the skin. Several hives may form a large area of raised, red skin. You can get hives after an insect sting, after taking medicine or eating certain foods, or because of infection or stress.  Other causes include Go to [email or web address] and sign in to your My[employer] Health[facility] account. Enter W260 in the [place] [employer] [place] box to learn more about \"Hives: Care Instructions. \"     If you do not have an account, please click on the \"Sign Up Now\" link. Current as of: March 20, 2017  Content Version: 11.3  © 4988-4011 [employer], [employer]. Care instructions adapted under license by [facility] ([facility]). If you have questions about a medical condition or this instruction, always ask your healthcare professional. [address] any warranty or liability for your use of this information.

## 2017-10-06 NOTE — ED PROVIDER NOTES
10/06/17 0317   BP: (!) 142/90   Pulse: 70   Resp: 18   Temp: 98.2 °F (36.8 °C)   TempSrc: Tympanic   SpO2: 98%   Weight: 216 lb (98 kg)   Height: 5' 3\" (1.6 m)     -------------------------  BP: (!) 142/90, Temp: 98.2 °F (36.8 °C), Pulse: 70, Resp: 18    Orders Placed This Encounter   Medications    methylPREDNISolone sodium (SOLU-MEDROL) injection 125 mg    famotidine (PEPCID) tablet 20 mg    diphenhydrAMINE (BENADRYL) injection 50 mg    famotidine (PEPCID) 20 MG tablet     Sig: Take 1 tablet by mouth 2 times daily for 10 doses     Dispense:  10 tablet     Refill:  0       During emergency department course, patient was given Solu-Medrol 125 mg and Benadryl 50 mg intramuscularly and Pepcid 20 mg orally. She is feeling much better and resting comfortably. Plan is to discharge the patient on Pepcid 20 mg twice daily for 5 days, continue prednisone as prescribed yesterday, take Benadryl 50 mg every 6 hours for itching, plenty of oral fluids, follow up with PCP, return if worse. She was advised to avoid re-exposure to dryer sheets she is allergic to. I have reviewed the disposition diagnosis with the patient and or their family/guardian. I have answered their questions and given discharge instructions. They voiced understanding of these instructions and did not have any further questions or complaints. Re-evaluation Notes    Upon reevaluation, patient is resting comfortably and does not appear to be in any pain or distress. Lip edema is resolved and itching is resolved. Rash is fading away. Patient prefers to go home. CRITICAL CARE:   None        CONSULTS:      PROCEDURES:  None    FINAL IMPRESSION      1.  Urticaria          DISPOSITION/PLAN   DISPOSITION Decision To Discharge    Condition on Disposition    stable    PATIENT REFERRED TO:  Damaris Martinez, [facility] [address] [phone]    Call in 2 days  For reevaluation of current symptoms    [facility]

## 2017-10-06 NOTE — ED NOTES
Discharged home with significant other. Verbalized understanding of taking medications regularly to decrease symptoms. Hussein;l follow with pmd or return if worse.      Jim Freedman RN  10/06/17 5516

## 2017-10-06 NOTE — ED PROVIDER NOTES
[address] [facility]  eMERGENCY dEPARTMENT eNCOUnter      Pt Name: Francie Sánchez  MRN: [id number]  [place] 1995  Date of evaluation: 10/6/2017      CHIEF COMPLAINT       Chief Complaint   Patient presents with    Allergic Reaction     x 2 days         HISTORY OF PRESENT ILLNESS      The patient presents with a little bit of dizziness and also the feeling that something might be stuck in her throat. The patient has been here for the past 2 days. She was diagnosed with urticaria. She was worried she was having anaphylaxis because of the difficulty swallowing. She says she's not feeling like her face or tongue are swollen. She says she felt like something got stuck when she was taking her pills today. She has been taking Benadryl and she does note that it makes her feel dizzy. The patient has a history of psoriasis but developed hives on top of this. She's been taking steroids, Zantac, and Benadryl. Nothing makes her symptoms better or worse otherwise. She is able to swallow and eat. REVIEW OF SYSTEMS       All systems reviewed and negative unless noted in HPI. The patient denies fever or constitutional symptoms. Denies vision change. Denies any sore throat or rhinorrhea. Denies lip or tongue swelling. Denies any neck pain or stiffness. Denies chest pain or shortness of breath. Recent nausea. Feels like something is \"stuck\" but able to swallow food and liquid. Denies any dysuria. Denies urinary frequency or hematuria. Denies musculoskeletal injury or pain. Denies any weakness, numbness or focal neurologic deficit. Psoriasis and hives, recently. No recent psychiatric issues. No easy bruising or bleeding. Denies any polyuria, polydypsia or history of immunocompromise. PAST MEDICAL HISTORY    has a past medical history of Acne; Anisocoria; Chickenpox; DVT (deep venous thrombosis) ([facility] [address] Obesity; Psoriasis; Seasonal allergies;  Social anxiety disorder; and Swollen tonsil. SURGICAL HISTORY      has a past surgical history that includes [place] tooth extraction; Endoscopic ultrasonography, GI; Colonoscopy (2010); Upper gastrointestinal endoscopy (2010); and ovarian cyst removal (Right, 09/27/2016). CURRENT MEDICATIONS       Previous Medications    ASPIRIN 81 MG TABLET    Take 81 mg by mouth daily    CLOBETASOL (TEMOVATE) 0.05 % OINTMENT    Apply topically 2 times daily to affected area. DIPHENHYDRAMINE (BENADRYL) 25 MG CAPSULE    Take 2 capsules by mouth every 6 hours as needed for Itching    FAMOTIDINE (PEPCID) 20 MG TABLET    Take 1 tablet by mouth 2 times daily for 10 doses    MECLIZINE (ANTIVERT) 12.5 MG TABLET    Take 12.5 mg by mouth 3 times daily as needed    PREDNISONE (DELTASONE) 10 MG TABLET    Take 4 tablets by mouth once daily for 5 days    RANITIDINE (ZANTAC) 150 MG TABLET    Take 1 tablet by mouth 2 times daily for 5 days       ALLERGIES     is allergic to ibuprofen. FAMILY HISTORY     indicated that her mother is alive. She indicated that her father is alive. She indicated that her maternal grandmother is alive. She indicated that her maternal grandfather is alive. She indicated that her paternal grandmother is alive. She indicated that her paternal grandfather is alive. She indicated that her maternal uncle is alive. She indicated that the status of her other is unknown. She indicated that the status of her neg hx is unknown.    family history includes Deep Vein Thrombosis in her father and mother; Depression in her father and mother; Diabetes in her paternal grandfather; Other in her father and mother; Thyroid Disease in her maternal grandmother, maternal uncle, and another family member. There is no history of Glaucoma. SOCIAL HISTORY      reports that she has never smoked. She has never used smokeless tobacco. She reports that she drinks alcohol. She reports that she does not use illicit drugs.     PHYSICAL EXAM     INITIAL VITALS: worsening difficulty breathing or swallowing. She is discharged in good condition. FINAL IMPRESSION      1.  Medication side effect, initial encounter          DISPOSITION/PLAN   DISPOSITION     Condition on Disposition    good    PATIENT REFERRED TO:  Chau Almaraz MD  [place] [id number] [phone]    In 2 days        DISCHARGE MEDICATIONS:  New Prescriptions    No medications on file       (Please note that portions of this note were completed with a voice recognition program.  Efforts were made to edit the dictations but occasionally words are mis-transcribed.)    Saxena MD   Attending Emergency Physician         Bhupinder Ambrocio MD  10/06/17 1079

## 2017-10-06 NOTE — ED NOTES
Reports swelling to lips and hives decreasing. No sob. Ready to go home.      Rajendra Osborne RN  10/06/17 1766

## 2017-10-08 ENCOUNTER — HOSPITAL ENCOUNTER (EMERGENCY)
Age: 22
Discharge: HOME | End: 2017-10-08
Attending: EMERGENCY MEDICINE
Payer: MEDICAID

## 2017-10-08 VITALS
HEART RATE: 68 BPM | OXYGEN SATURATION: 100 % | SYSTOLIC BLOOD PRESSURE: 120 MMHG | DIASTOLIC BLOOD PRESSURE: 76 MMHG | TEMPERATURE: 96.8 F | RESPIRATION RATE: 14 BRPM

## 2017-10-08 DIAGNOSIS — J06.9 ACUTE UPPER RESPIRATORY INFECTION: Primary | ICD-10-CM

## 2017-10-08 PROCEDURE — 6360000002 HC RX W HCPCS: Performed by: EMERGENCY MEDICINE

## 2017-10-08 PROCEDURE — 99283 EMERGENCY DEPT VISIT LOW MDM: CPT

## 2017-10-08 PROCEDURE — 94640 AIRWAY INHALATION TREATMENT: CPT

## 2017-10-08 RX ORDER — ALBUTEROL SULFATE 90 UG/1
2 AEROSOL, METERED RESPIRATORY (INHALATION) EVERY 4 HOURS PRN
Qty: 1 INHALER | Refills: 0 | Status: SHIPPED | OUTPATIENT
Start: 2017-10-08 | End: 2018-04-16 | Stop reason: ALTCHOICE

## 2017-10-08 RX ORDER — ALBUTEROL SULFATE 2.5 MG/3ML
2.5 SOLUTION RESPIRATORY (INHALATION) ONCE
Status: COMPLETED | OUTPATIENT
Start: 2017-10-08 | End: 2017-10-08

## 2017-10-08 RX ADMIN — ALBUTEROL SULFATE 2.5 MG: 2.5 SOLUTION RESPIRATORY (INHALATION) at 03:01

## 2017-10-08 ASSESSMENT — ENCOUNTER SYMPTOMS
CHEST TIGHTNESS: 0
DIARRHEA: 0
WHEEZING: 0
EYE PAIN: 0
RHINORRHEA: 1
SORE THROAT: 1
COUGH: 1
VOMITING: 0
STRIDOR: 0
TROUBLE SWALLOWING: 0
BACK PAIN: 0
NAUSEA: 0
ABDOMINAL PAIN: 0

## 2017-10-08 NOTE — ED PROVIDER NOTES
[address] [facility] ED  [address] [facility]  Phone: [phone]    eMERGENCY dEPARTMENT eNCOUnter          Pt Name: Connie Ellington  MRN: [id number]  [place] 1995  Date of evaluation: 10/8/2017      CHIEF COMPLAINT       Chief Complaint   Patient presents with    Shortness of Breath     onset 10 min pta       HISTORY OF PRESENT ILLNESS            Connie Ellington is a 23 y.o. female who presents With upper respiratory infection-like symptoms and reported mild dyspnea. States it occurred earlier this evening. Patient is currently being treated for urticaria and is still taking steroids, Benadryl and Zantac. She reports the rash has improved. She states she does have anxiety and thought that she might be having an anaphylactic reaction. Patient's boyfriend has similar upper respiratory infection symptoms and she thinks she may have contracted the symptoms from him. Does have some sinus congestion. Does have some mild pharyngitis. Is able to swallow without problems. She has not yet followed up with her PCP. Patient does have a history of a DVT. Not currently on anticoagulants. States she has had follow-up scans of her legs that show no DVTs. No history of PE. Denies any recent trauma. No long periods of immobilization recently. Not currently on any estrogen/progesterone supplements. She does also report a mild cough that is nonproductive. She denies other concerns this evening. REVIEW OF SYSTEMS         Review of Systems   Constitutional: Negative for chills, fatigue and fever. HENT: Positive for congestion, rhinorrhea and sore throat. Negative for ear discharge, ear pain and trouble swallowing. Eyes: Negative for pain. Respiratory: Positive for cough. Negative for chest tightness, wheezing and stridor. Cardiovascular: Negative for chest pain, palpitations and leg swelling.    Gastrointestinal: Negative for abdominal pain, diarrhea, nausea and vomiting. Genitourinary: Negative for difficulty urinating and dysuria. Musculoskeletal: Negative for back pain and neck pain. Skin: Negative for rash. Neurological: Negative for weakness, numbness and headaches. PAST MEDICAL HISTORY    has a past medical history of Acne; Anisocoria; Chickenpox; DVT (deep venous thrombosis) ([address] Obesity; Psoriasis; Seasonal allergies; Social anxiety disorder; and Swollen tonsil. SURGICAL HISTORY      has a past surgical history that includes [place] tooth extraction; Endoscopic ultrasonography, GI; Colonoscopy (2010); Upper gastrointestinal endoscopy (2010); and ovarian cyst removal (Right, 09/27/2016). CURRENT MEDICATIONS       Previous Medications    ASPIRIN 81 MG TABLET    Take 81 mg by mouth daily    CLOBETASOL (TEMOVATE) 0.05 % OINTMENT    Apply topically 2 times daily to affected area. DIPHENHYDRAMINE (BENADRYL) 25 MG CAPSULE    Take 2 capsules by mouth every 6 hours as needed for Itching    FAMOTIDINE (PEPCID) 20 MG TABLET    Take 1 tablet by mouth 2 times daily for 10 doses    MECLIZINE (ANTIVERT) 12.5 MG TABLET    Take 12.5 mg by mouth 3 times daily as needed    PREDNISONE (DELTASONE) 10 MG TABLET    Take 4 tablets by mouth once daily for 5 days    RANITIDINE (ZANTAC) 150 MG TABLET    Take 1 tablet by mouth 2 times daily for 5 days       ALLERGIES     is allergic to ibuprofen. FAMILY HISTORY     indicated that her mother is alive. She indicated that her father is alive. She indicated that her maternal grandmother is alive. She indicated that her maternal grandfather is alive. She indicated that her paternal grandmother is alive. She indicated that her paternal grandfather is alive. She indicated that her maternal uncle is alive. She indicated that the status of her other is unknown.  She indicated that the status of her neg hx is unknown.      family history includes Deep Vein Thrombosis in her father and mother; Depression in her father and [facility],   Attending Emergency Physician          Justin Moralez DO  10/08/17 7103

## 2017-10-24 ENCOUNTER — HOSPITAL ENCOUNTER (EMERGENCY)
Age: 22
Discharge: HOME | End: 2017-10-25
Attending: EMERGENCY MEDICINE
Payer: MEDICAID

## 2017-10-24 VITALS
DIASTOLIC BLOOD PRESSURE: 95 MMHG | TEMPERATURE: 98.2 F | RESPIRATION RATE: 16 BRPM | OXYGEN SATURATION: 98 % | SYSTOLIC BLOOD PRESSURE: 137 MMHG | HEART RATE: 80 BPM

## 2017-10-24 DIAGNOSIS — K62.5 RECTAL BLEEDING: Primary | ICD-10-CM

## 2017-10-24 PROCEDURE — 99282 EMERGENCY DEPT VISIT SF MDM: CPT

## 2017-10-25 NOTE — ED PROVIDER NOTES
eMERGENCY dEPARTMENT eNCOUnter      Pt Name: Amber Montenegro  MRN: [id number]  [place] 1995  Date of evaluation: 10/24/2017      CHIEF COMPLAINT       Chief Complaint   Patient presents with    Rectal Bleeding     since this am         HISTORY OF PRESENT ILLNESS    Amber Montenegro is a 23 y.o. female who presents With rectal bleeding. Patient states actually she had one episode about an hour prior to presentation where she had bright red blood in her stool. She states she had a previous episode like this sometimes the past, which she had a rectal fissure. She denies associated abdominal pain, denies any chest pain, shortness of breath, lightheadedness. Denies any other exacerbating relieving factors. She actually has not had problems since this morning. She is been one episode         REVIEW OF SYSTEMS       Positive bright red blood in stool. Negative for fever, chills, chest pain, abdominal pain, back pain, nausea, vomiting, lightheadedness     PAST MEDICAL HISTORY    has a past medical history of Acne; Anisocoria; Chickenpox; DVT (deep venous thrombosis) ([address] Obesity; Psoriasis; Seasonal allergies; Social anxiety disorder; and Swollen tonsil. SURGICAL HISTORY      has a past surgical history that includes [place] tooth extraction; Endoscopic ultrasonography, GI; Colonoscopy (2010); Upper gastrointestinal endoscopy (2010); and ovarian cyst removal (Right, 09/27/2016). CURRENT MEDICATIONS       Previous Medications    ALBUTEROL SULFATE HFA (PROVENTIL HFA) 108 (90 BASE) MCG/ACT INHALER    Inhale 2 puffs into the lungs every 4 hours as needed for Wheezing or Shortness of Breath (Space out to every 6 hours as symptoms improve) Space out to every 6 hours as symptoms improve. ASPIRIN 81 MG TABLET    Take 81 mg by mouth daily    CLOBETASOL (TEMOVATE) 0.05 % OINTMENT    Apply topically 2 times daily to affected area.     FAMOTIDINE (PEPCID) 20 MG TABLET    Take 1 tablet by mouth 2 times Temp: 98.2 °F (36.8 °C)   SpO2: 98%     -------------------------  BP: (!) 137/95, Temp: 98.2 °F (36.8 °C), Pulse: 80, Resp: 16    No orders of the defined types were placed in this encounter. Re-evaluation Notes    Patient is asymptomatic. She had 1 episode she has had a history of fissures in the past.  Her story is that she had a very hard stool yesterday, so very well may be a reoccurrence of a fissure. She was instructed. Follow up her primary and return if any problems were to arise        FINAL IMPRESSION      1. Rectal bleeding          DISPOSITION/PLAN   DISPOSITION Decision to Discharge    Condition on Disposition    stable    PATIENT REFERRED TO:  Bessie Galvez MD  [place] [address] [id number] [phone]    In 1 day        DISCHARGE MEDICATIONS:  New Prescriptions    No medications on file       (Please note that portions of this note were completed with a voice recognition program.  Efforts were made to edit the dictations but occasionally words are mis-transcribed.)    Ashby MD, F.A.C.E.P.   Attending Emergency Physician        Devon Morejon MD  10/25/17 0562

## 2017-11-27 ENCOUNTER — HOSPITAL ENCOUNTER (OUTPATIENT)
Age: 22
Setting detail: SPECIMEN
Discharge: HOME | End: 2017-11-27
Payer: MEDICAID

## 2017-11-27 ENCOUNTER — OFFICE VISIT (OUTPATIENT)
Dept: OBGYN | Age: 22
End: 2017-11-27
Payer: MEDICAID

## 2017-11-27 VITALS
BODY MASS INDEX: 38.52 KG/M2 | DIASTOLIC BLOOD PRESSURE: 66 MMHG | WEIGHT: 217.4 LBS | HEIGHT: 63 IN | SYSTOLIC BLOOD PRESSURE: 114 MMHG | HEART RATE: 84 BPM

## 2017-11-27 DIAGNOSIS — N83.209 CYST OF OVARY, UNSPECIFIED LATERALITY: ICD-10-CM

## 2017-11-27 DIAGNOSIS — Z12.4 SCREENING FOR CERVICAL CANCER: ICD-10-CM

## 2017-11-27 DIAGNOSIS — Z11.3 SCREENING FOR STD (SEXUALLY TRANSMITTED DISEASE): ICD-10-CM

## 2017-11-27 DIAGNOSIS — Z01.419 WELL FEMALE EXAM WITH ROUTINE GYNECOLOGICAL EXAM: Primary | ICD-10-CM

## 2017-11-27 PROCEDURE — 87491 CHLMYD TRACH DNA AMP PROBE: CPT

## 2017-11-27 PROCEDURE — 87624 HPV HI-RISK TYP POOLED RSLT: CPT

## 2017-11-27 PROCEDURE — 87591 N.GONORRHOEAE DNA AMP PROB: CPT

## 2017-11-27 PROCEDURE — 99395 PREV VISIT EST AGE 18-39: CPT | Performed by: NURSE PRACTITIONER

## 2017-11-27 PROCEDURE — G0145 SCR C/V CYTO,THINLAYER,RESCR: HCPCS

## 2017-11-27 NOTE — PROGRESS NOTES
Thrombosis Mother     Other Father      acne    Depression Father     Deep Vein Thrombosis Father     Thyroid Disease Maternal Grandmother     Diabetes Paternal Grandfather     Thyroid Disease Maternal Uncle     Thyroid Disease Other     Glaucoma Neg Hx      Social History     Social History    Marital status:      Spouse name: N/A    Number of children: N/A    Years of education: N/A     Occupational History     [employer]     Social History Main Topics    Smoking status: Never Smoker    Smokeless tobacco: Never Used    Alcohol use 0.0 oz/week      Comment: socially    Drug use: No    Sexual activity: Yes     Partners: Male     Birth control/ protection: Pill     Other Topics Concern    Not on file     Social History Narrative    No narrative on file       MEDICATIONS:  Current Outpatient Prescriptions   Medication Sig Dispense Refill    clobetasol (TEMOVATE) 0.05 % ointment Apply topically 2 times daily to affected area. 60 g 1    albuterol sulfate HFA (PROVENTIL HFA) 108 (90 Base) MCG/ACT inhaler Inhale 2 puffs into the lungs every 4 hours as needed for Wheezing or Shortness of Breath (Space out to every 6 hours as symptoms improve) Space out to every 6 hours as symptoms improve. 1 Inhaler 0    famotidine (PEPCID) 20 MG tablet Take 1 tablet by mouth 2 times daily for 10 doses 10 tablet 0    aspirin 81 MG tablet Take 81 mg by mouth daily      ranitidine (ZANTAC) 150 MG tablet Take 1 tablet by mouth 2 times daily for 5 days 10 tablet 0     No current facility-administered medications for this visit. ALLERGIES:  Allergies as of 11/27/2017 - Review Complete 11/27/2017   Allergen Reaction Noted    Ibuprofen Nausea And Vomiting 10/23/2014           Gynecologic History:  Menarche: 4 yo  Patient's last menstrual period was 11/13/2017 (exact date).   Hormone Exposure: No    Family History of Breast, Ovarian , Colon or Uterine Cancer: No     Preventative Health Testing:  Date of Last non-tender. There were good bowel sounds in all quadrants and there was no guarding, rebound or rigidity. On evaluation there was no evidence of hepatosplenomegaly and there was no costal vertebral jing tenderness bilaterally. No hernias were appreciated. Psych: The patient had a normal Orientation to: Time, Place, Person, and Situation  Mood and affect appropriate    Breast:  (Chest)  normal appearance, no masses or tenderness, Inspection negative, No nipple retraction or dimpling, No nipple discharge or bleeding, No axillary or supraclavicular adenopathy, Normal to palpation without dominant masses, negative findings: normal in size and symmetry, normal contour with no evidence of flattening or dimpling, skin normal, nipples everted without rashes or discharge, palpation negative for masses or nodules, no palpable axillary lymphadenopathy, positive findings: fibrocystic changes      Pelvic Exam:  External genitalia: normal general appearance  Urinary system: urethral meatus normal  Vaginal: normal mucosa without prolapse or lesions, normal without tenderness, induration or masses and normal rugae  Cervix: normal appearance, thin prep PAP obtained and GC/CT prep obtained  Adnexa: normal bimanual exam and non palpable  Uterus: normal single, nontender and anteverted  Bimanual exam compromised by body habitus    Musculosk:  Normal Gait and station was noted. Digits were evaluated without abnormal findings. Range of motion, stability and strength were evaluated and found to be appropriate for the patients age. ASSESSMENT:      23 y.o. Annual  1. Well female exam with routine gynecological exam     2. Screening for cervical cancer  PAP SMEAR   3. Screening for STD (sexually transmitted disease)  C.trachomatis N.gonorrhoeae DNA   4.  Cyst of ovary, unspecified laterality  US Non OB Transvaginal    US Pelvis Complete        Chief Complaint   Patient presents with    New Patient    Gynecologic Exam annual exam and pap         Past Medical History:   Diagnosis Date    Acne     history of     Anisocoria     history of     Chickenpox     history of      DVT (deep venous thrombosis) (HCC)     Obesity     Psoriasis     Seasonal allergies     Social anxiety disorder     Swollen tonsil     history of          Patient Active Problem List   Diagnosis    Psoriasis    Acne    Obesity    Myopia of both eyes with astigmatism    Anisocoria    GERD (gastroesophageal reflux disease)    LPRD (laryngopharyngeal reflux disease)    Social anxiety disorder    Cyst of right ovary          Hereditary Breast, Ovarian, Colon and Uterine Cancer screening Done. Tobacco & Secondary smoke risks reviewed; instructed on cessation and avoidance    PLAN:  Return in about 1 year (around 11/27/2018) for Annual Exam.  Repeat pap per ASCCP 2013 guidelines  Return for annual exams  Mammograms every 1 year. If 33 yo and last mammogram was negative. Routine health maintenance per patients PCP. Orders Placed This Encounter   Procedures    C.trachomatis N.gonorrhoeae DNA     Standing Status:   Future     Standing Expiration Date:   12/27/2017    US Non OB Transvaginal     Standing Status:   Future     Standing Expiration Date:   2/25/2018     Scheduling Instructions: You will need a full bladder for this test     Order Specific Question:   Reason for exam:     Answer:   history of ovarian cysts. Right ovarian cystectomy 2016    US Pelvis Complete     Standing Status:   Future     Standing Expiration Date:   2/25/2018     Scheduling Instructions: You will need a full bladder for this test.     Order Specific Question:   Reason for exam:     Answer:   history of ovarian cysts. Right ovarian cystectomy 2016    PAP SMEAR     Patient History:    Patient's last menstrual period was 11/13/2017 (exact date).   OBGYN Status: Having periods  Past Surgical History:  2010: COLONOSCOPY      Comment: [facility]  No

## 2017-11-28 LAB
C TRACH DNA GENITAL QL NAA+PROBE: NEGATIVE
N. GONORRHOEAE DNA: NEGATIVE

## 2017-11-29 LAB
HPV SAMPLE: NORMAL
HPV SOURCE: NORMAL
HPV, GENOTYPE 16: NOT DETECTED
HPV, GENOTYPE 18: NOT DETECTED
HPV, HIGH RISK OTHER: NOT DETECTED
HPV, INTERPRETATION: NORMAL

## 2017-12-05 LAB — CYTOLOGY REPORT: NORMAL

## 2018-01-03 ENCOUNTER — HOSPITAL ENCOUNTER (OUTPATIENT)
Dept: ULTRASOUND IMAGING | Age: 23
Discharge: HOME | End: 2018-01-03
Payer: MEDICAID

## 2018-01-03 DIAGNOSIS — N83.209 CYST OF OVARY, UNSPECIFIED LATERALITY: ICD-10-CM

## 2018-01-03 PROCEDURE — 76856 US EXAM PELVIC COMPLETE: CPT

## 2018-01-17 ENCOUNTER — OFFICE VISIT (OUTPATIENT)
Dept: OPTOMETRY | Age: 23
End: 2018-01-17
Payer: COMMERCIAL

## 2018-01-17 DIAGNOSIS — H52.203 MYOPIA OF BOTH EYES WITH ASTIGMATISM: Primary | ICD-10-CM

## 2018-01-17 DIAGNOSIS — H52.13 MYOPIA OF BOTH EYES WITH ASTIGMATISM: Primary | ICD-10-CM

## 2018-01-17 PROCEDURE — 92004 COMPRE OPH EXAM NEW PT 1/>: CPT | Performed by: OPTOMETRIST

## 2018-01-17 RX ORDER — BENOXINATE HCL/FLUORESCEIN SOD 0.4%-0.25%
1 DROPS OPHTHALMIC (EYE) ONCE
Status: COMPLETED | OUTPATIENT
Start: 2018-01-17 | End: 2018-01-17

## 2018-01-17 RX ADMIN — Medication 1 DROP: at 11:11

## 2018-01-17 ASSESSMENT — TONOMETRY
IOP_METHOD: APPLANATION W FLURESS DROP
OS_IOP_MMHG: 17
OD_IOP_MMHG: 17

## 2018-01-17 ASSESSMENT — REFRACTION_WEARINGRX
OD_CYLINDER: -0.25
OD_SPHERE: -2.75
OS_SPHERE: -3.75
OS_CYLINDER: -0.25
OS_AXIS: 086
OD_AXIS: 091

## 2018-01-17 ASSESSMENT — REFRACTION_MANIFEST
OS_AXIS: 070
OD_CYLINDER: -0.25
OD_SPHERE: -2.75
OS_CYLINDER: -0.50
OS_SPHERE: -3.75
OD_AXIS: 091

## 2018-01-17 ASSESSMENT — VISUAL ACUITY
OS_CC: 20/20
CORRECTION_TYPE: GLASSES
METHOD: SNELLEN - LINEAR

## 2018-01-17 ASSESSMENT — SLIT LAMP EXAM - LIDS
COMMENTS: NORMAL
COMMENTS: NORMAL

## 2018-01-17 NOTE — PROGRESS NOTES
Teri Wallace presents today for   Chief Complaint   Patient presents with    Vision Exam   .    HPI     Last Vision Exam: 9/24/14  Last Ophthalmology Exam: None  Last Filled Glasses Rx: 9/24/14  Insurance: March vision   Update: Glasses  Scratched lenses may be making the vision worse  Full time glasses                       Main Ophthalmology Exam     External Exam       Right Left    External Normal Normal          Slit Lamp Exam       Right Left    Lids/Lashes Normal Normal    Conjunctiva/Sclera White and quiet White and quiet    Cornea Clear Clear    Anterior Chamber Deep and quiet Deep and quiet    Iris fixed at 2.5-3.0 mm  Round and reactive    Lens Clear Clear    Vitreous Normal Normal          Fundus Exam       Right Left    Disc Normal Normal    C/D Ratio 0.05 0.05    Macula Normal Normal    Vessels Normal Normal                   Tonometry     Tonometry (Applanation w Fluress drop, 11:44 AM)       Right Left    Pressure 17 17               Visual Acuity (Snellen - Linear)       Right Left    Dist cc 20/20 20/20    Correction:  Glasses         Not recorded          Ophthalmology Exam     Wearing Rx       Sphere Cylinder Axis    Right -2.75 -0.25 091    Left -3.75 -0.25 086                Manifest Refraction     Manifest Refraction       Sphere Cylinder Axis Dist VA    Right -2.75 -0.25 091 20/20    Left -3.75 -0.50 070 20/20          Manifest Refraction #2 (Auto)       Sphere Cylinder Axis Dist VA    Right -2.75 -0.25 091     Left -3.50 -0.50 066                Final Rx       Sphere Cylinder Axis    Right -2.75 -0.25 091    Left -3.75 -0.50 070    Type:  SVL    Expiration Date:  1/18/2019            1. Myopia of both eyes with astigmatism           Patient Instructions   New glasses recommended      Return in about 2 years (around 1/17/2020) for complete eye exam.

## 2018-04-16 ENCOUNTER — OFFICE VISIT (OUTPATIENT)
Dept: PRIMARY CARE CLINIC | Age: 23
End: 2018-04-16
Payer: MEDICAID

## 2018-04-16 VITALS
OXYGEN SATURATION: 98 % | BODY MASS INDEX: 40.54 KG/M2 | DIASTOLIC BLOOD PRESSURE: 74 MMHG | HEIGHT: 63 IN | HEART RATE: 74 BPM | WEIGHT: 228.8 LBS | SYSTOLIC BLOOD PRESSURE: 126 MMHG | TEMPERATURE: 98 F

## 2018-04-16 DIAGNOSIS — L40.9 PSORIASIS: ICD-10-CM

## 2018-04-16 DIAGNOSIS — N89.8 VAGINAL DISCHARGE: Primary | ICD-10-CM

## 2018-04-16 PROCEDURE — 1036F TOBACCO NON-USER: CPT | Performed by: FAMILY MEDICINE

## 2018-04-16 PROCEDURE — G8427 DOCREV CUR MEDS BY ELIG CLIN: HCPCS | Performed by: FAMILY MEDICINE

## 2018-04-16 PROCEDURE — G8417 CALC BMI ABV UP PARAM F/U: HCPCS | Performed by: FAMILY MEDICINE

## 2018-04-16 PROCEDURE — 99214 OFFICE O/P EST MOD 30 MIN: CPT | Performed by: FAMILY MEDICINE

## 2018-04-16 RX ORDER — METRONIDAZOLE 7.5 MG/G
GEL VAGINAL
Qty: 1 TUBE | Refills: 0 | Status: SHIPPED | OUTPATIENT
Start: 2018-04-16 | End: 2018-08-16 | Stop reason: ALTCHOICE

## 2018-04-16 RX ORDER — CLOBETASOL PROPIONATE 0.5 MG/G
OINTMENT TOPICAL
Qty: 60 G | Refills: 1 | Status: SHIPPED | OUTPATIENT
Start: 2018-04-16 | End: 2020-06-22 | Stop reason: SDUPTHER

## 2018-04-16 ASSESSMENT — ENCOUNTER SYMPTOMS
RESPIRATORY NEGATIVE: 1
ALLERGIC/IMMUNOLOGIC NEGATIVE: 1
EYES NEGATIVE: 1
GASTROINTESTINAL NEGATIVE: 1

## 2018-04-19 ENCOUNTER — HOSPITAL ENCOUNTER (OUTPATIENT)
Age: 23
Setting detail: SPECIMEN
Discharge: HOME | End: 2018-04-19
Payer: MEDICAID

## 2018-04-19 ENCOUNTER — OFFICE VISIT (OUTPATIENT)
Dept: PRIMARY CARE CLINIC | Age: 23
End: 2018-04-19
Payer: MEDICAID

## 2018-04-19 VITALS
BODY MASS INDEX: 42.69 KG/M2 | WEIGHT: 232 LBS | HEART RATE: 74 BPM | TEMPERATURE: 98.2 F | SYSTOLIC BLOOD PRESSURE: 126 MMHG | HEIGHT: 62 IN | OXYGEN SATURATION: 98 % | DIASTOLIC BLOOD PRESSURE: 74 MMHG

## 2018-04-19 DIAGNOSIS — S33.5XXA LUMBAR SPRAIN, INITIAL ENCOUNTER: Primary | ICD-10-CM

## 2018-04-19 DIAGNOSIS — R10.30 LOWER ABDOMINAL PAIN: ICD-10-CM

## 2018-04-19 LAB
-: ABNORMAL
AMORPHOUS: ABNORMAL
BACTERIA: ABNORMAL
BILIRUBIN URINE: NEGATIVE
CASTS UA: ABNORMAL /LPF (ref 0–2)
COLOR: ABNORMAL
COMMENT UA: ABNORMAL
CRYSTALS, UA: ABNORMAL /HPF
EPITHELIAL CELLS UA: ABNORMAL /HPF (ref 0–5)
GLUCOSE URINE: NEGATIVE
KETONES, URINE: NEGATIVE
LEUKOCYTE ESTERASE, URINE: NEGATIVE
MUCUS: ABNORMAL
NITRITE, URINE: NEGATIVE
OTHER OBSERVATIONS UA: ABNORMAL
PH UA: 6 (ref 5–6)
PROTEIN UA: NEGATIVE
RBC UA: ABNORMAL /HPF (ref 0–4)
RENAL EPITHELIAL, UA: ABNORMAL /HPF
SPECIFIC GRAVITY UA: 1.02 (ref 1.01–1.02)
TRICHOMONAS: ABNORMAL
TURBIDITY: ABNORMAL
URINE HGB: NEGATIVE
UROBILINOGEN, URINE: NORMAL
WBC UA: ABNORMAL /HPF (ref 0–4)
YEAST: ABNORMAL

## 2018-04-19 PROCEDURE — 1036F TOBACCO NON-USER: CPT | Performed by: FAMILY MEDICINE

## 2018-04-19 PROCEDURE — G8427 DOCREV CUR MEDS BY ELIG CLIN: HCPCS | Performed by: FAMILY MEDICINE

## 2018-04-19 PROCEDURE — 99213 OFFICE O/P EST LOW 20 MIN: CPT | Performed by: FAMILY MEDICINE

## 2018-04-19 PROCEDURE — G8417 CALC BMI ABV UP PARAM F/U: HCPCS | Performed by: FAMILY MEDICINE

## 2018-04-19 PROCEDURE — 81001 URINALYSIS AUTO W/SCOPE: CPT

## 2018-04-19 ASSESSMENT — ENCOUNTER SYMPTOMS
RESPIRATORY NEGATIVE: 1
ABDOMINAL PAIN: 1
BACK PAIN: 1
ALLERGIC/IMMUNOLOGIC NEGATIVE: 1
EYES NEGATIVE: 1
NAUSEA: 1

## 2018-04-23 ENCOUNTER — OFFICE VISIT (OUTPATIENT)
Dept: OBGYN | Age: 23
End: 2018-04-23
Payer: MEDICAID

## 2018-04-23 VITALS
SYSTOLIC BLOOD PRESSURE: 118 MMHG | WEIGHT: 226 LBS | BODY MASS INDEX: 40.04 KG/M2 | HEIGHT: 63 IN | HEART RATE: 80 BPM | DIASTOLIC BLOOD PRESSURE: 72 MMHG

## 2018-04-23 DIAGNOSIS — F41.8 ANXIETY ABOUT HEALTH: Primary | ICD-10-CM

## 2018-04-23 PROCEDURE — 99213 OFFICE O/P EST LOW 20 MIN: CPT | Performed by: ADVANCED PRACTICE MIDWIFE

## 2018-04-23 RX ORDER — ACETAMINOPHEN 325 MG/1
650 TABLET ORAL EVERY 6 HOURS PRN
COMMUNITY
End: 2022-06-07

## 2018-04-23 ASSESSMENT — PATIENT HEALTH QUESTIONNAIRE - PHQ9
1. LITTLE INTEREST OR PLEASURE IN DOING THINGS: 1
2. FEELING DOWN, DEPRESSED OR HOPELESS: 1
SUM OF ALL RESPONSES TO PHQ9 QUESTIONS 1 & 2: 2
SUM OF ALL RESPONSES TO PHQ QUESTIONS 1-9: 2

## 2018-04-23 ASSESSMENT — ENCOUNTER SYMPTOMS
RESPIRATORY NEGATIVE: 1
EYES NEGATIVE: 1
GASTROINTESTINAL NEGATIVE: 1
ALLERGIC/IMMUNOLOGIC NEGATIVE: 1

## 2018-05-01 ENCOUNTER — HOSPITAL ENCOUNTER (OUTPATIENT)
Age: 23
Setting detail: SPECIMEN
Discharge: HOME | End: 2018-05-01
Payer: MEDICAID

## 2018-05-01 ENCOUNTER — OFFICE VISIT (OUTPATIENT)
Dept: OBGYN | Age: 23
End: 2018-05-01
Payer: MEDICAID

## 2018-05-01 VITALS
WEIGHT: 225 LBS | HEIGHT: 63 IN | HEART RATE: 82 BPM | BODY MASS INDEX: 39.87 KG/M2 | DIASTOLIC BLOOD PRESSURE: 76 MMHG | SYSTOLIC BLOOD PRESSURE: 118 MMHG

## 2018-05-01 DIAGNOSIS — N89.8 VAGINAL DISCHARGE: ICD-10-CM

## 2018-05-01 DIAGNOSIS — N89.8 VAGINAL DISCHARGE: Primary | ICD-10-CM

## 2018-05-01 DIAGNOSIS — B37.9 CANDIDIASIS: ICD-10-CM

## 2018-05-01 LAB
DIRECT EXAM: NORMAL
Lab: NORMAL
SPECIMEN DESCRIPTION: NORMAL
SPECIMEN DESCRIPTION: NORMAL
STATUS: NORMAL

## 2018-05-01 PROCEDURE — 87660 TRICHOMONAS VAGIN DIR PROBE: CPT

## 2018-05-01 PROCEDURE — 99213 OFFICE O/P EST LOW 20 MIN: CPT | Performed by: ADVANCED PRACTICE MIDWIFE

## 2018-05-01 PROCEDURE — 87510 GARDNER VAG DNA DIR PROBE: CPT

## 2018-05-01 PROCEDURE — 87480 CANDIDA DNA DIR PROBE: CPT

## 2018-05-01 RX ORDER — FLUCONAZOLE 150 MG/1
150 TABLET ORAL DAILY
Qty: 2 TABLET | Refills: 0 | Status: SHIPPED | OUTPATIENT
Start: 2018-05-01 | End: 2018-05-03

## 2018-05-01 ASSESSMENT — ENCOUNTER SYMPTOMS
RESPIRATORY NEGATIVE: 1
ALLERGIC/IMMUNOLOGIC NEGATIVE: 1
GASTROINTESTINAL NEGATIVE: 1
EYES NEGATIVE: 1

## 2018-05-14 ENCOUNTER — OFFICE VISIT (OUTPATIENT)
Dept: PRIMARY CARE CLINIC | Age: 23
End: 2018-05-14
Payer: MEDICAID

## 2018-05-14 VITALS
TEMPERATURE: 98.6 F | SYSTOLIC BLOOD PRESSURE: 108 MMHG | RESPIRATION RATE: 18 BRPM | BODY MASS INDEX: 39.37 KG/M2 | HEART RATE: 74 BPM | DIASTOLIC BLOOD PRESSURE: 70 MMHG | HEIGHT: 63 IN | WEIGHT: 222.2 LBS | OXYGEN SATURATION: 98 %

## 2018-05-14 DIAGNOSIS — T36.95XA ANTIBIOTIC-INDUCED YEAST INFECTION: ICD-10-CM

## 2018-05-14 DIAGNOSIS — B37.9 ANTIBIOTIC-INDUCED YEAST INFECTION: ICD-10-CM

## 2018-05-14 DIAGNOSIS — J01.40 ACUTE NON-RECURRENT PANSINUSITIS: Primary | ICD-10-CM

## 2018-05-14 PROCEDURE — G8417 CALC BMI ABV UP PARAM F/U: HCPCS | Performed by: NURSE PRACTITIONER

## 2018-05-14 PROCEDURE — G8427 DOCREV CUR MEDS BY ELIG CLIN: HCPCS | Performed by: NURSE PRACTITIONER

## 2018-05-14 PROCEDURE — 1036F TOBACCO NON-USER: CPT | Performed by: NURSE PRACTITIONER

## 2018-05-14 PROCEDURE — 99213 OFFICE O/P EST LOW 20 MIN: CPT | Performed by: NURSE PRACTITIONER

## 2018-05-14 RX ORDER — FLUCONAZOLE 150 MG/1
150 TABLET ORAL ONCE
Qty: 1 TABLET | Refills: 1 | Status: SHIPPED | OUTPATIENT
Start: 2018-05-14 | End: 2018-05-14

## 2018-05-14 RX ORDER — AMOXICILLIN 875 MG/1
875 TABLET, COATED ORAL 2 TIMES DAILY
Qty: 20 TABLET | Refills: 0 | Status: SHIPPED | OUTPATIENT
Start: 2018-05-14 | End: 2018-05-24

## 2018-05-14 ASSESSMENT — ENCOUNTER SYMPTOMS
SINUS COMPLAINT: 1
DIARRHEA: 1
RHINORRHEA: 0
SHORTNESS OF BREATH: 1
COUGH: 1
NAUSEA: 1
SINUS PRESSURE: 1
SORE THROAT: 0

## 2018-06-04 ENCOUNTER — OFFICE VISIT (OUTPATIENT)
Dept: PRIMARY CARE CLINIC | Age: 23
End: 2018-06-04
Payer: MEDICAID

## 2018-06-04 VITALS
HEIGHT: 63 IN | OXYGEN SATURATION: 93 % | TEMPERATURE: 97.9 F | BODY MASS INDEX: 39.51 KG/M2 | DIASTOLIC BLOOD PRESSURE: 84 MMHG | HEART RATE: 82 BPM | SYSTOLIC BLOOD PRESSURE: 128 MMHG | WEIGHT: 223 LBS

## 2018-06-04 DIAGNOSIS — J30.9 ACUTE ALLERGIC RHINITIS, UNSPECIFIED SEASONALITY, UNSPECIFIED TRIGGER: Primary | ICD-10-CM

## 2018-06-04 PROCEDURE — 99213 OFFICE O/P EST LOW 20 MIN: CPT | Performed by: FAMILY MEDICINE

## 2018-06-04 PROCEDURE — G8427 DOCREV CUR MEDS BY ELIG CLIN: HCPCS | Performed by: FAMILY MEDICINE

## 2018-06-04 PROCEDURE — G8417 CALC BMI ABV UP PARAM F/U: HCPCS | Performed by: FAMILY MEDICINE

## 2018-06-04 PROCEDURE — 1036F TOBACCO NON-USER: CPT | Performed by: FAMILY MEDICINE

## 2018-06-04 ASSESSMENT — ENCOUNTER SYMPTOMS
SINUS PRESSURE: 1
GASTROINTESTINAL NEGATIVE: 1
RESPIRATORY NEGATIVE: 1
EYE ITCHING: 1
SORE THROAT: 0
TROUBLE SWALLOWING: 0
RHINORRHEA: 0
SHORTNESS OF BREATH: 0
WHEEZING: 0
COUGH: 0

## 2018-08-04 ENCOUNTER — HOSPITAL ENCOUNTER (EMERGENCY)
Age: 23
Discharge: HOME | End: 2018-08-04
Attending: EMERGENCY MEDICINE
Payer: MEDICAID

## 2018-08-04 VITALS
HEART RATE: 77 BPM | SYSTOLIC BLOOD PRESSURE: 115 MMHG | RESPIRATION RATE: 14 BRPM | TEMPERATURE: 98.3 F | OXYGEN SATURATION: 98 % | DIASTOLIC BLOOD PRESSURE: 73 MMHG

## 2018-08-04 DIAGNOSIS — T78.40XA ALLERGIC REACTION, INITIAL ENCOUNTER: Primary | ICD-10-CM

## 2018-08-04 PROCEDURE — 99283 EMERGENCY DEPT VISIT LOW MDM: CPT

## 2018-08-04 PROCEDURE — 6360000002 HC RX W HCPCS: Performed by: EMERGENCY MEDICINE

## 2018-08-04 PROCEDURE — 96374 THER/PROPH/DIAG INJ IV PUSH: CPT

## 2018-08-04 PROCEDURE — 2500000003 HC RX 250 WO HCPCS: Performed by: EMERGENCY MEDICINE

## 2018-08-04 PROCEDURE — 96375 TX/PRO/DX INJ NEW DRUG ADDON: CPT

## 2018-08-04 PROCEDURE — S0028 INJECTION, FAMOTIDINE, 20 MG: HCPCS | Performed by: EMERGENCY MEDICINE

## 2018-08-04 RX ORDER — PREDNISONE 10 MG/1
TABLET ORAL
Qty: 20 TABLET | Refills: 0 | Status: SHIPPED | OUTPATIENT
Start: 2018-08-04 | End: 2018-08-14

## 2018-08-04 RX ORDER — METHYLPREDNISOLONE SODIUM SUCCINATE 125 MG/2ML
125 INJECTION, POWDER, LYOPHILIZED, FOR SOLUTION INTRAMUSCULAR; INTRAVENOUS ONCE
Status: COMPLETED | OUTPATIENT
Start: 2018-08-04 | End: 2018-08-04

## 2018-08-04 RX ORDER — DIPHENHYDRAMINE HYDROCHLORIDE 50 MG/ML
25 INJECTION INTRAMUSCULAR; INTRAVENOUS ONCE
Status: COMPLETED | OUTPATIENT
Start: 2018-08-04 | End: 2018-08-04

## 2018-08-04 RX ORDER — DIPHENHYDRAMINE HCL 25 MG
25 CAPSULE ORAL EVERY 6 HOURS PRN
Qty: 20 CAPSULE | Refills: 0 | Status: SHIPPED | OUTPATIENT
Start: 2018-08-04 | End: 2018-08-08

## 2018-08-04 RX ORDER — EPINEPHRINE 0.3 MG/.3ML
0.3 INJECTION SUBCUTANEOUS ONCE
Qty: 2 EACH | Refills: 0 | Status: SHIPPED | OUTPATIENT
Start: 2018-08-04 | End: 2020-12-21 | Stop reason: SDUPTHER

## 2018-08-04 RX ORDER — FAMOTIDINE 20 MG/1
20 TABLET, FILM COATED ORAL 2 TIMES DAILY
Qty: 10 TABLET | Refills: 0 | Status: SHIPPED | OUTPATIENT
Start: 2018-08-04 | End: 2020-08-22

## 2018-08-04 RX ADMIN — METHYLPREDNISOLONE SODIUM SUCCINATE 125 MG: 125 INJECTION, POWDER, FOR SOLUTION INTRAMUSCULAR; INTRAVENOUS at 01:26

## 2018-08-04 RX ADMIN — DIPHENHYDRAMINE HYDROCHLORIDE 25 MG: 50 INJECTION, SOLUTION INTRAMUSCULAR; INTRAVENOUS at 01:26

## 2018-08-04 RX ADMIN — FAMOTIDINE 20 MG: 10 INJECTION, SOLUTION INTRAVENOUS at 01:26

## 2018-08-04 ASSESSMENT — ENCOUNTER SYMPTOMS
TROUBLE SWALLOWING: 1
NAUSEA: 0
SHORTNESS OF BREATH: 0
VOMITING: 0

## 2018-08-06 ENCOUNTER — CARE COORDINATION (OUTPATIENT)
Dept: CARE COORDINATION | Age: 23
End: 2018-08-06

## 2018-08-06 NOTE — CARE COORDINATION
Attempted to reach patient for ed follow up, detailed message left with instructions to return call to care coordination at [phone]

## 2018-08-16 ENCOUNTER — OFFICE VISIT (OUTPATIENT)
Dept: FAMILY MEDICINE CLINIC | Age: 23
End: 2018-08-16
Payer: MEDICAID

## 2018-08-16 VITALS
HEIGHT: 63 IN | HEART RATE: 65 BPM | OXYGEN SATURATION: 99 % | SYSTOLIC BLOOD PRESSURE: 118 MMHG | BODY MASS INDEX: 37.03 KG/M2 | WEIGHT: 209 LBS | DIASTOLIC BLOOD PRESSURE: 80 MMHG

## 2018-08-16 DIAGNOSIS — Z88.9 MULTIPLE ALLERGIES: ICD-10-CM

## 2018-08-16 DIAGNOSIS — T78.40XD ALLERGIC REACTION, SUBSEQUENT ENCOUNTER: Primary | ICD-10-CM

## 2018-08-16 PROCEDURE — G8427 DOCREV CUR MEDS BY ELIG CLIN: HCPCS | Performed by: NURSE PRACTITIONER

## 2018-08-16 PROCEDURE — G8417 CALC BMI ABV UP PARAM F/U: HCPCS | Performed by: NURSE PRACTITIONER

## 2018-08-16 PROCEDURE — 99214 OFFICE O/P EST MOD 30 MIN: CPT | Performed by: NURSE PRACTITIONER

## 2018-08-16 PROCEDURE — 1036F TOBACCO NON-USER: CPT | Performed by: NURSE PRACTITIONER

## 2018-08-16 ASSESSMENT — ENCOUNTER SYMPTOMS
TROUBLE SWALLOWING: 0
VOMITING: 0
CONSTIPATION: 0
DIARRHEA: 0
RESPIRATORY NEGATIVE: 1
ALLERGIC/IMMUNOLOGIC NEGATIVE: 1
COUGH: 0
ABDOMINAL PAIN: 0
CHEST TIGHTNESS: 0
SINUS PRESSURE: 0
NAUSEA: 0
SHORTNESS OF BREATH: 0
EYES NEGATIVE: 1

## 2018-08-16 NOTE — PROGRESS NOTES
ointment Apply topically 2 times daily to affected area. 60 g 1    aspirin 81 MG tablet Take 81 mg by mouth daily      EPINEPHrine (EPIPEN 2-GABBY) 0.3 MG/0.3ML SOAJ injection Inject 0.3 mLs into the muscle once for 1 dose Use as directed for allergic reaction 2 each 0    famotidine (PEPCID) 20 MG tablet Take 1 tablet by mouth 2 times daily for 5 days 10 tablet 0     No current facility-administered medications for this visit. Review of Systems   Constitutional: Negative for activity change, appetite change and fever. HENT: Negative for congestion, ear pain, sinus pressure and trouble swallowing. Eyes: Negative. Respiratory: Negative. Negative for cough, chest tightness and shortness of breath. Cardiovascular: Negative. Negative for chest pain and palpitations. Gastrointestinal: Negative for abdominal pain, constipation, diarrhea, nausea and vomiting. Endocrine: Negative. Genitourinary: Negative for difficulty urinating and dysuria. Musculoskeletal: Negative. Skin: Positive for rash (torso). Allergic/Immunologic: Negative. Neurological: Negative for dizziness, light-headedness and headaches. Hematological: Negative. Psychiatric/Behavioral: Negative. Objective:      /80   Pulse 65   Ht 5' 3\" (1.6 m)   Wt 209 lb (94.8 kg)   SpO2 99%   BMI 37.02 kg/m²     Physical Exam   Constitutional: She is oriented to person, place, and time. She appears well-developed and well-nourished. HENT:   Head: Normocephalic and atraumatic. Right Ear: Hearing and external ear normal.   Left Ear: Hearing and external ear normal.   Nose: Nose normal.   Mouth/Throat: Uvula is midline, oropharynx is clear and moist and mucous membranes are normal.   Eyes: Pupils are equal, round, and reactive to light. Conjunctivae and EOM are normal.   Neck: Normal range of motion. Neck supple. Cardiovascular: Normal rate, regular rhythm, normal heart sounds and intact distal pulses.

## 2018-08-20 ENCOUNTER — OFFICE VISIT (OUTPATIENT)
Dept: PRIMARY CARE CLINIC | Age: 23
End: 2018-08-20
Payer: MEDICAID

## 2018-08-20 ENCOUNTER — HOSPITAL ENCOUNTER (OUTPATIENT)
Dept: INTERVENTIONAL RADIOLOGY/VASCULAR | Age: 23
Discharge: HOME | End: 2018-08-22
Payer: MEDICAID

## 2018-08-20 VITALS
WEIGHT: 216 LBS | BODY MASS INDEX: 38.27 KG/M2 | TEMPERATURE: 98.9 F | DIASTOLIC BLOOD PRESSURE: 70 MMHG | HEART RATE: 79 BPM | HEIGHT: 63 IN | SYSTOLIC BLOOD PRESSURE: 94 MMHG

## 2018-08-20 DIAGNOSIS — M79.605 LEFT LEG PAIN: Primary | ICD-10-CM

## 2018-08-20 DIAGNOSIS — Z86.718 HISTORY OF DVT (DEEP VEIN THROMBOSIS): ICD-10-CM

## 2018-08-20 DIAGNOSIS — M79.605 LEFT LEG PAIN: ICD-10-CM

## 2018-08-20 PROCEDURE — 93971 EXTREMITY STUDY: CPT

## 2018-08-20 PROCEDURE — 99213 OFFICE O/P EST LOW 20 MIN: CPT | Performed by: FAMILY MEDICINE

## 2018-08-20 PROCEDURE — G8427 DOCREV CUR MEDS BY ELIG CLIN: HCPCS | Performed by: FAMILY MEDICINE

## 2018-08-20 PROCEDURE — G8417 CALC BMI ABV UP PARAM F/U: HCPCS | Performed by: FAMILY MEDICINE

## 2018-08-20 PROCEDURE — 1036F TOBACCO NON-USER: CPT | Performed by: FAMILY MEDICINE

## 2018-08-20 RX ORDER — DIPHENHYDRAMINE HCL 25 MG
25 CAPSULE ORAL EVERY 6 HOURS PRN
COMMUNITY
End: 2020-12-18

## 2018-08-20 ASSESSMENT — ENCOUNTER SYMPTOMS
SHORTNESS OF BREATH: 0
CHEST TIGHTNESS: 0
WHEEZING: 0
SWOLLEN GLANDS: 0

## 2018-08-20 NOTE — PROGRESS NOTES
[facility]  [address]  Dept: [phone]  Dept Fax: [phone]  Loc: [phone]    Kushal  is a 20 y.o. female who presents today for her medical conditions/complaints as noted below. Kushal  is c/o of   Chief Complaint   Patient presents with    Leg Injury     3 days ago felt knee pop. Had DVT last year. Concerned. HPI:     Here today for leg pain. Leg Injury   This is a new problem. The current episode started in the past 7 days (3 days). The problem occurs constantly. The problem has been gradually worsening. Associated symptoms include arthralgias (left leg pain) and a rash (has psoriasis). Pertinent negatives include no chest pain, fatigue, fever, joint swelling, numbness or swollen glands. Associated symptoms comments: History of blood clots. Nothing aggravates the symptoms. She has tried position changes for the symptoms. The treatment provided mild relief. She is not taking her aspirin. Past Medical History:   Diagnosis Date    Acne     history of     Anisocoria     history of     Chickenpox     history of      DVT (deep venous thrombosis) (HCC)     Obesity     Psoriasis     Seasonal allergies     Social anxiety disorder     Swollen tonsil     history of           Social History   Substance Use Topics    Smoking status: Never Smoker    Smokeless tobacco: Never Used    Alcohol use 0.0 oz/week      Comment: socially      Current Outpatient Prescriptions   Medication Sig Dispense Refill    diphenhydrAMINE (BENADRYL) 25 MG capsule Take 25 mg by mouth every 6 hours as needed for Itching      acetaminophen (TYLENOL) 325 MG tablet Take 650 mg by mouth every 6 hours as needed for Pain      clobetasol (TEMOVATE) 0.05 % ointment Apply topically 2 times daily to affected area.  60 g 1    aspirin 81 MG tablet Take 81 mg by mouth daily      EPINEPHrine (EPIPEN 2-GABBY) 0.3 MG/0.3ML SOAJ injection

## 2018-08-20 NOTE — PATIENT INSTRUCTIONS
Patient Education        Calf Strain: Rehab Exercises  Your Care Instructions  Here are some examples of typical rehabilitation exercises for your condition. Start each exercise slowly. Ease off the exercise if you start to have pain. Your doctor or physical therapist will tell you when you can start these exercises and which ones will work best for you. How to do the exercises  Calf wall stretch (back knee straight)    1. Stand facing a wall with your hands on the wall at about eye level. Put your affected leg about a step behind your other leg. 2. Keeping your back leg straight and your back heel on the floor, bend your front knee and gently bring your hip and chest toward the wall until you feel a stretch in the calf of your back leg. 3. Hold the stretch for at least 15 to 30 seconds. 4. Repeat 2 to 4 times. Calf wall stretch (knees bent)    1. Stand facing a wall with your hands on the wall at about eye level. Put your affected leg about a step behind your other leg. 2. Keeping both heels on the floor, bend both knees. Then gently bring your hip and chest toward the wall until you feel a stretch in the calf of your back leg. 3. Hold the stretch for at least 15 to 30 seconds. 4. Repeat 2 to 4 times. Bilateral calf stretch (knees straight)    1. Place a book on the floor a few inches from a wall or countertop, and put the balls of your feet on it. Your heels should be on the floor. The book needs to be thick enough so that you can feel a gentle stretch in each calf. If you are not steady on your feet, hold on to a chair, counter, or wall while you do this stretch. 2. Keep your knees straight, and lean forward until you feel a stretch in each calf. 3. To get more stretch, add another book or use a thicker book, such as a phone book, a dictionary, or an encyclopedia. 4. Hold the stretch for at least 15 to 30 seconds. 5. Repeat 2 to 4 times. Bilateral calf stretch (knees bent)    1.  Place a book on the floor a few inches from a wall or countertop, and put the balls of your feet on it. Your heels should be on the floor. The book needs to be thick enough so that you can feel a gentle stretch in each calf. If you are not steady on your feet, hold on to a chair, counter, or wall while you do this stretch. 2. Bend your knees, and lean forward until you feel a stretch in each calf. 3. To get more stretch, add another book or use a thicker book, such as a phone book, a dictionary, or an encyclopedia. 4. Hold the stretch for at least 15 to 30 seconds. 5. Repeat 2 to 4 times. Ankle plantarflexion    1. Sit with your affected leg straight and supported on the floor. Your other leg should be bent, with that foot flat on the floor. 2. Keeping your affected leg straight, gently flex your foot downward so your toes are pointed away from your body. Then slowly relax your foot to the starting position. 3. Repeat 8 to 12 times. Ankle dorsiflexion    1. Sit with your affected leg straight and supported on the floor. Your other leg should be bent, with that foot flat on the floor. 2. Keeping your leg straight, gently flex your foot back so your toes point upward. Then slowly relax your foot to the starting position. 3. Repeat 8 to 12 times. Bilateral heel raises on step    1. Stand on the bottom step of a staircase, facing up toward the stairs. Put the balls of your feet on the step. If you are not steady on your feet, hold on to the banister or wall. 2. Keeping both knees straight, slowly lift your heels above the step so that you are standing on your toes. Then slowly lower your heels below the step and toward the floor. 3. Return to the starting position, with your feet even with the step. 4. Repeat 8 to 12 times. Follow-up care is a key part of your treatment and safety. Be sure to make and go to all appointments, and call your doctor if you are having problems.  It's also a good idea to know your test results

## 2018-09-13 ENCOUNTER — HOSPITAL ENCOUNTER (OUTPATIENT)
Age: 23
Setting detail: SPECIMEN
Discharge: HOME | End: 2018-09-13
Payer: MEDICAID

## 2018-09-13 ENCOUNTER — OFFICE VISIT (OUTPATIENT)
Dept: OBGYN | Age: 23
End: 2018-09-13
Payer: MEDICAID

## 2018-09-13 VITALS
HEIGHT: 63 IN | RESPIRATION RATE: 20 BRPM | SYSTOLIC BLOOD PRESSURE: 110 MMHG | HEART RATE: 84 BPM | DIASTOLIC BLOOD PRESSURE: 70 MMHG | BODY MASS INDEX: 37.92 KG/M2 | WEIGHT: 214 LBS

## 2018-09-13 DIAGNOSIS — N89.8 VAGINAL DISCHARGE: ICD-10-CM

## 2018-09-13 DIAGNOSIS — L29.2 VULVAR ITCHING: ICD-10-CM

## 2018-09-13 DIAGNOSIS — N89.8 VAGINAL DISCHARGE: Primary | ICD-10-CM

## 2018-09-13 LAB
DIRECT EXAM: NORMAL
Lab: NORMAL
SPECIMEN DESCRIPTION: NORMAL
STATUS: NORMAL

## 2018-09-13 PROCEDURE — 87480 CANDIDA DNA DIR PROBE: CPT

## 2018-09-13 PROCEDURE — 87510 GARDNER VAG DNA DIR PROBE: CPT

## 2018-09-13 PROCEDURE — 87660 TRICHOMONAS VAGIN DIR PROBE: CPT

## 2018-09-13 PROCEDURE — 99213 OFFICE O/P EST LOW 20 MIN: CPT | Performed by: ADVANCED PRACTICE MIDWIFE

## 2018-09-13 NOTE — PROGRESS NOTES
Subjective:      Patient ID: Jarad Aviles  is a 20 y.o. y.o. female. Geoffrey Pope presents today with report of vulvar itching for the past couple of days. She reports that she had episode of diarrhea and is concerned that she may have an infection d/t contamination with stool. She reports with prodding that she \"washed out her vagina this morning. \"        Review of Systems   Genitourinary:        Vulvar itching. Breast ROS: negative    Objective:   /70 (Site: Left Upper Arm, Position: Sitting, Cuff Size: Medium Adult)   Pulse 84   Resp 20   Ht 5' 3\" (1.6 m)   Wt 214 lb (97.1 kg)   LMP 08/28/2018 (Approximate)   BMI 37.91 kg/m²   Physical Exam   Constitutional: She is oriented to person, place, and time. She appears well-developed and well-nourished. HENT:   Head: Normocephalic. Eyes: Conjunctivae are normal.   Neck: Normal range of motion. Cardiovascular: Normal rate. Pulmonary/Chest: Effort normal and breath sounds normal. Right breast exhibits no inverted nipple, no mass, no nipple discharge, no skin change and no tenderness. Left breast exhibits no inverted nipple, no mass, no nipple discharge, no skin change and no tenderness. Breasts are symmetrical.   Abdominal: Soft. Bowel sounds are normal.   Genitourinary: Vagina normal and uterus normal.   Genitourinary Comments: Vaginal canal pink with rugae present and very scanty secretions. Cervix is clear without discharge. Uterine and adnexal exam deferred. Note minor vulvar reddness and irritation on labia minora. Musculoskeletal: Normal range of motion. Neurological: She is alert and oriented to person, place, and time. Skin: Skin is warm and dry. Psychiatric: She has a normal mood and affect. Her behavior is normal. Judgment and thought content normal.         Assessment:      Diagnosis Orders   1.  Vaginal discharge  VAGINITIS DNA PROBE   2. Vulvar itching  VAGINITIS DNA PROBE           Plan:     Orders Placed This Encounter   Procedures    VAGINITIS DNA PROBE     Standing Status:   Future     Number of Occurrences:   1     Standing Expiration Date:   10/13/2018   Education: encouraged and reinforced that her vaginal canal does not need to be cleansed. It is a self cleaning organ. She may use OTC vagisil for topical discomfort. Will await the vaginitis probe but have doubts regarding accuracy. RTO prn.

## 2018-12-05 ENCOUNTER — OFFICE VISIT (OUTPATIENT)
Dept: PRIMARY CARE CLINIC | Age: 23
End: 2018-12-05
Payer: MEDICAID

## 2018-12-05 VITALS
SYSTOLIC BLOOD PRESSURE: 126 MMHG | TEMPERATURE: 98 F | OXYGEN SATURATION: 98 % | HEART RATE: 74 BPM | WEIGHT: 209.6 LBS | BODY MASS INDEX: 37.13 KG/M2 | DIASTOLIC BLOOD PRESSURE: 74 MMHG

## 2018-12-05 DIAGNOSIS — J34.89 SINUS PAIN: Primary | ICD-10-CM

## 2018-12-05 PROCEDURE — G8484 FLU IMMUNIZE NO ADMIN: HCPCS | Performed by: FAMILY MEDICINE

## 2018-12-05 PROCEDURE — 1036F TOBACCO NON-USER: CPT | Performed by: FAMILY MEDICINE

## 2018-12-05 PROCEDURE — 99213 OFFICE O/P EST LOW 20 MIN: CPT | Performed by: FAMILY MEDICINE

## 2018-12-05 PROCEDURE — G8427 DOCREV CUR MEDS BY ELIG CLIN: HCPCS | Performed by: FAMILY MEDICINE

## 2018-12-05 PROCEDURE — G8417 CALC BMI ABV UP PARAM F/U: HCPCS | Performed by: FAMILY MEDICINE

## 2018-12-05 ASSESSMENT — ENCOUNTER SYMPTOMS
SINUS PRESSURE: 1
RESPIRATORY NEGATIVE: 1
SORE THROAT: 0
EYES NEGATIVE: 1
GASTROINTESTINAL NEGATIVE: 1
TROUBLE SWALLOWING: 0
SINUS PAIN: 1
RHINORRHEA: 1
FACIAL SWELLING: 0
VOICE CHANGE: 0

## 2018-12-10 ENCOUNTER — HOSPITAL ENCOUNTER (OUTPATIENT)
Dept: LAB | Age: 23
Discharge: HOME | End: 2018-12-10
Payer: MEDICAID

## 2018-12-10 ENCOUNTER — OFFICE VISIT (OUTPATIENT)
Dept: OBGYN | Age: 23
End: 2018-12-10
Payer: MEDICAID

## 2018-12-10 VITALS
SYSTOLIC BLOOD PRESSURE: 112 MMHG | HEIGHT: 63 IN | HEART RATE: 68 BPM | BODY MASS INDEX: 37.03 KG/M2 | DIASTOLIC BLOOD PRESSURE: 64 MMHG | WEIGHT: 209 LBS

## 2018-12-10 DIAGNOSIS — Z12.4 SCREENING FOR CERVICAL CANCER: ICD-10-CM

## 2018-12-10 DIAGNOSIS — Z01.419 WELL FEMALE EXAM WITH ROUTINE GYNECOLOGICAL EXAM: Primary | ICD-10-CM

## 2018-12-10 DIAGNOSIS — Z01.419 WELL FEMALE EXAM WITH ROUTINE GYNECOLOGICAL EXAM: ICD-10-CM

## 2018-12-10 PROCEDURE — G8484 FLU IMMUNIZE NO ADMIN: HCPCS | Performed by: NURSE PRACTITIONER

## 2018-12-10 PROCEDURE — 99385 PREV VISIT NEW AGE 18-39: CPT | Performed by: NURSE PRACTITIONER

## 2018-12-10 PROCEDURE — 82306 VITAMIN D 25 HYDROXY: CPT

## 2018-12-10 PROCEDURE — 36415 COLL VENOUS BLD VENIPUNCTURE: CPT

## 2018-12-10 PROCEDURE — G0123 SCREEN CERV/VAG THIN LAYER: HCPCS

## 2018-12-10 RX ORDER — DIPHENHYDRAMINE HCL 25 MG
25 CAPSULE ORAL
COMMUNITY
End: 2020-11-27

## 2018-12-13 LAB — VITAMIN D 25-HYDROXY: 10.9 NG/ML (ref 30–100)

## 2018-12-14 DIAGNOSIS — E55.9 VITAMIN D DEFICIENCY: Primary | ICD-10-CM

## 2018-12-14 RX ORDER — ERGOCALCIFEROL 1.25 MG/1
50000 CAPSULE ORAL WEEKLY
Qty: 12 CAPSULE | Refills: 0 | Status: SHIPPED | OUTPATIENT
Start: 2018-12-14 | End: 2020-08-22

## 2018-12-31 LAB — CYTOLOGY REPORT: NORMAL

## 2019-08-08 ENCOUNTER — TELEPHONE (OUTPATIENT)
Dept: OBGYN | Age: 24
End: 2019-08-08

## 2019-08-08 DIAGNOSIS — E55.9 VITAMIN D DEFICIENCY: Primary | ICD-10-CM

## 2019-09-16 ENCOUNTER — TELEPHONE (OUTPATIENT)
Dept: OBGYN | Age: 24
End: 2019-09-16

## 2019-12-16 ENCOUNTER — TELEPHONE (OUTPATIENT)
Dept: OBGYN | Age: 24
End: 2019-12-16

## 2020-01-08 ENCOUNTER — TELEPHONE (OUTPATIENT)
Dept: OBGYN | Age: 25
End: 2020-01-08

## 2020-06-22 RX ORDER — CLOBETASOL PROPIONATE 0.5 MG/G
OINTMENT TOPICAL
Qty: 60 G | Refills: 1 | Status: SHIPPED | OUTPATIENT
Start: 2020-06-22 | End: 2020-08-22

## 2020-08-22 ENCOUNTER — OFFICE VISIT (OUTPATIENT)
Dept: PRIMARY CARE CLINIC | Age: 25
End: 2020-08-22
Payer: COMMERCIAL

## 2020-08-22 VITALS
HEART RATE: 98 BPM | OXYGEN SATURATION: 98 % | BODY MASS INDEX: 36.31 KG/M2 | WEIGHT: 205 LBS | SYSTOLIC BLOOD PRESSURE: 120 MMHG | DIASTOLIC BLOOD PRESSURE: 74 MMHG | TEMPERATURE: 98.2 F

## 2020-08-22 PROCEDURE — 99212 OFFICE O/P EST SF 10 MIN: CPT

## 2020-08-22 PROCEDURE — 99214 OFFICE O/P EST MOD 30 MIN: CPT | Performed by: FAMILY MEDICINE

## 2020-08-22 PROCEDURE — 1036F TOBACCO NON-USER: CPT | Performed by: FAMILY MEDICINE

## 2020-08-22 PROCEDURE — G8417 CALC BMI ABV UP PARAM F/U: HCPCS | Performed by: FAMILY MEDICINE

## 2020-08-22 PROCEDURE — G8427 DOCREV CUR MEDS BY ELIG CLIN: HCPCS | Performed by: FAMILY MEDICINE

## 2020-08-22 RX ORDER — BUSPIRONE HYDROCHLORIDE 10 MG/1
10 TABLET ORAL 3 TIMES DAILY PRN
Qty: 90 TABLET | Refills: 1 | Status: SHIPPED | OUTPATIENT
Start: 2020-08-22 | End: 2020-09-21

## 2020-08-22 ASSESSMENT — PATIENT HEALTH QUESTIONNAIRE - PHQ9
2. FEELING DOWN, DEPRESSED OR HOPELESS: 0
SUM OF ALL RESPONSES TO PHQ QUESTIONS 1-9: 0
SUM OF ALL RESPONSES TO PHQ QUESTIONS 1-9: 0
SUM OF ALL RESPONSES TO PHQ9 QUESTIONS 1 & 2: 0
1. LITTLE INTEREST OR PLEASURE IN DOING THINGS: 0

## 2020-08-22 ASSESSMENT — ENCOUNTER SYMPTOMS
NAUSEA: 0
DIARRHEA: 1
WHEEZING: 0
SHORTNESS OF BREATH: 0
COUGH: 0
CHEST TIGHTNESS: 0
ABDOMINAL PAIN: 1
CONSTIPATION: 0

## 2020-08-22 NOTE — PATIENT INSTRUCTIONS
Patient Education        Panic Attacks: Care Instructions  Your Care Instructions     During a panic attack, you may have a feeling of intense fear or terror, trouble breathing, chest pain or tightness, heartbeat changes, dizziness, sweating, and shaking. A panic attack starts suddenly and usually lasts from 5 to 20 minutes but may last even longer. You have the most anxiety about 10 minutes after the attack starts. An attack can begin with a stressful event, or it can happen without a cause. Although panic attacks can cause scary symptoms, you can learn to manage them with self-care, counseling, and medicine. Follow-up care is a key part of your treatment and safety. Be sure to make and go to all appointments, and call your doctor if you are having problems. It's also a good idea to know your test results and keep a list of the medicines you take. How can you care for yourself at home? · Take your medicine exactly as directed. Call your doctor if you think you are having a problem with your medicine. · Go to your counseling sessions and follow-up appointments. · Recognize and accept your anxiety. Then, when you are in a situation that makes you anxious, say to yourself, \"This is not an emergency. I feel uncomfortable, but I am not in danger. I can keep going even if I feel anxious. \"  · Be kind to your body:  ? Relieve tension with exercise or a massage. ? Get enough rest.  ? Avoid alcohol, caffeine, nicotine, and illegal drugs. They can increase your anxiety level, cause sleep problems, or trigger a panic attack. ? Learn and do relaxation techniques. See below for more about these techniques. · Engage your mind. Get out and do something you enjoy. Go to a funny movie, or take a walk or hike. Plan your day. Having too much or too little to do can make you anxious. · Keep a record of your symptoms.  Discuss your fears with a good friend or family member, or join a support group for people with similar do anything that might cause an accident if you are not fully alert. Never play a relaxation tape while driving a car. When should you call for help? [id number] anytime you think you may need emergency care. For example, call if:  · You feel you cannot stop from hurting yourself or someone else. Watch closely for changes in your health, and be sure to contact your doctor if:  · Your panic attacks get worse. · You have new or different anxiety. · You are not getting better as expected. Where can you learn more? Go to https://[employer]ak[employer]Science.Snappli. org and sign in to your [employer] account. Enter H601 in the FastDue box to learn more about \"Panic Attacks: Care Instructions. \"     If you do not have an account, please click on the \"Sign Up Now\" link. Current as of: January 31, 2020               Content Version: 12.5  © 9160-1822 [employer]. Care instructions adapted under license by [facility] ([facility]). If you have questions about a medical condition or this instruction, always ask your healthcare professional. [place] [address] any warranty or liability for your use of this information.

## 2020-08-22 NOTE — PROGRESS NOTES
[address] [place] [address]  Dept: [phone]  Dept Fax: [phone]  Loc: [phone]    Carmela Capps is a 30 y.o. female who presents today for her medical conditions/complaints as noted below. Carmela Capps is c/o of   Chief Complaint   Patient presents with    Shortness of Breath     arms feel funny, diarrhea sinus infections allergies panic attacks       HPI:     HPI here today for anxiety. This morning she woke up and she was doing okay and then she started having trouble breathing and she started having some numbness and tinglign in her arm. She also had some diarrhea as well. She was trying to talk herself down and she realized that she has been having more panic attacks recently. She recently started a new; job. She has just had some abdominal pain. She just keeps tryign to figure out what else could be the problem. She tried sleeping on a pillow wedge to help with her possible GERD but it didn't help. She has been scared of COVID and she has been using alcohol to sanitize herself with 99% alcohol and her mom told her she could give herself alcohol poisoning. She has been trying to wash her hands regularly. She has been trying to eat healthy. She is starting school next week. She has been on effexor in the past.       Past Medical History:   Diagnosis Date    Acne     history of     Anisocoria     history of     Chickenpox     history of      DVT (deep venous thrombosis) ([facility] [address]     H/O deep venous thrombosis 8/22/2020    In left leg, due to OCPs    Obesity     Psoriasis     Seasonal allergies     Social anxiety disorder     Swollen tonsil     history of     Vitamin D deficiency 12/14/2018          Social History     Tobacco Use    Smoking status: Never Smoker    Smokeless tobacco: Never Used   Substance Use Topics    Alcohol use:  Yes     Alcohol/week: 0.0 standard

## 2020-10-14 ENCOUNTER — TELEPHONE (OUTPATIENT)
Dept: OBGYN | Age: 25
End: 2020-10-14

## 2020-10-14 ENCOUNTER — OFFICE VISIT (OUTPATIENT)
Dept: PRIMARY CARE CLINIC | Age: 25
End: 2020-10-14
Payer: COMMERCIAL

## 2020-10-14 VITALS
HEIGHT: 63 IN | TEMPERATURE: 96.8 F | WEIGHT: 208 LBS | SYSTOLIC BLOOD PRESSURE: 110 MMHG | DIASTOLIC BLOOD PRESSURE: 70 MMHG | OXYGEN SATURATION: 99 % | RESPIRATION RATE: 18 BRPM | HEART RATE: 73 BPM | BODY MASS INDEX: 36.86 KG/M2

## 2020-10-14 LAB — S PYO AG THROAT QL: NORMAL

## 2020-10-14 PROCEDURE — 87880 STREP A ASSAY W/OPTIC: CPT | Performed by: NURSE PRACTITIONER

## 2020-10-14 PROCEDURE — 99213 OFFICE O/P EST LOW 20 MIN: CPT | Performed by: NURSE PRACTITIONER

## 2020-10-14 ASSESSMENT — PATIENT HEALTH QUESTIONNAIRE - PHQ9
SUM OF ALL RESPONSES TO PHQ QUESTIONS 1-9: 2
SUM OF ALL RESPONSES TO PHQ9 QUESTIONS 1 & 2: 2
2. FEELING DOWN, DEPRESSED OR HOPELESS: 1
SUM OF ALL RESPONSES TO PHQ QUESTIONS 1-9: 2
1. LITTLE INTEREST OR PLEASURE IN DOING THINGS: 1

## 2020-10-14 ASSESSMENT — ENCOUNTER SYMPTOMS
COUGH: 1
RHINORRHEA: 0
GASTROINTESTINAL NEGATIVE: 1
SORE THROAT: 1
SHORTNESS OF BREATH: 0
SINUS PRESSURE: 0
WHEEZING: 0

## 2020-10-14 NOTE — PATIENT INSTRUCTIONS
Patient Education        Allergies: Care Instructions  Your Care Instructions     Allergies occur when your body's defense system (immune system) overreacts to certain substances. The immune system treats a harmless substance as if it were a harmful germ or virus. Many things can make this happen. These include pollens, medicine, food, dust, animal dander, and mold. Allergies can be mild or severe. Mild allergies can be managed with home treatment. But medicine may be needed to prevent problems. Managing your allergies is an important part of staying healthy. Your doctor may suggest that you have allergy testing to help find out what is causing your allergies. Severe allergies can cause reactions that affect your whole body (anaphylactic reactions). Your doctor may prescribe a shot of epinephrine to carry with you in case you have a severe reaction. Learn how to give yourself the shot and keep it with you at all times. Make sure it is not . Follow-up care is a key part of your treatment and safety. Be sure to make and go to all appointments, and call your doctor if you are having problems. It's also a good idea to know your test results and keep a list of the medicines you take. How can you care for yourself at home? · If you have been told by your doctor that dust or dust mites are causing your allergy, decrease the dust around your bed:  ? Wash sheets, pillowcases, and other bedding in hot water every week. ? Use dust-proof covers for pillows, duvets, and mattresses. Avoid plastic covers because they tear easily and do not \"breathe. \" Wash as instructed on the label. ? Do not use any blankets and pillows that you do not need. ? Use blankets that you can wash in your washing machine. ? Consider removing drapes and carpets, which attract and hold dust, from your bedroom. · If you are allergic to house dust and mites, do not use home humidifiers.  Your doctor can suggest ways you can control dust and mites. · Look for signs of cockroaches. Cockroaches cause allergic reactions. Use cockroach baits to get rid of them. Then, clean your home well. Cockroaches like areas where grocery bags, newspapers, empty bottles, or cardboard boxes are stored. Do not keep these inside your home, and keep trash and food containers sealed. Seal off any spots where cockroaches might enter your home. · If you are allergic to mold, get rid of furniture, rugs, and drapes that smell musty. Check for mold in the bathroom. · If you are allergic to outdoor pollen or mold spores, use air-conditioning. Change or clean all filters every month. Keep windows closed. · If you are allergic to pollen, stay inside when pollen counts are high. Use a vacuum  with a HEPA filter or a double-thickness filter at least two times each week. · Stay inside when air pollution is bad. Avoid paint fumes, perfumes, and other strong odors. · Avoid conditions that make your allergies worse. Stay away from smoke. Do not smoke or let anyone else smoke in your house. Do not use fireplaces or wood-burning stoves. · If you are allergic to your pets, change the air filter in your furnace every month. Use high-efficiency filters. · If you are allergic to pet dander, keep pets outside or out of your bedroom. Old carpet and cloth furniture can hold a lot of animal dander. You may need to replace them. When should you call for help? Give an epinephrine shot if:    · You think you are having a severe allergic reaction.     · You have symptoms in more than one body area, such as mild nausea and an itchy mouth. After giving an epinephrine shot call 911, even if you feel better. Call 911 if:    · You have symptoms of a severe allergic reaction. These may include:  ? Sudden raised, red areas (hives) all over your body. ? Swelling of the throat, mouth, lips, or tongue. ? Trouble breathing. ? Passing out (losing consciousness).  Or you may feel very better. You need to take the full course of antibiotics. · Gargle with warm salt water once an hour to help reduce swelling and relieve discomfort. Use 1 teaspoon of salt mixed in 1 cup of warm water. · Take an over-the-counter pain medicine, such as acetaminophen (Tylenol), ibuprofen (Advil, Motrin), or naproxen (Aleve). Read and follow all instructions on the label. · Be careful when taking over-the-counter cold or flu medicines and Tylenol at the same time. Many of these medicines have acetaminophen, which is Tylenol. Read the labels to make sure that you are not taking more than the recommended dose. Too much acetaminophen (Tylenol) can be harmful. · Drink plenty of fluids. Fluids may help soothe an irritated throat. Hot fluids, such as tea or soup, may help decrease throat pain. · Use over-the-counter throat lozenges to soothe pain. Regular cough drops or hard candy may also help. These should not be given to young children because of the risk of choking. · Do not smoke or allow others to smoke around you. If you need help quitting, talk to your doctor about stop-smoking programs and medicines. These can increase your chances of quitting for good. · Use a vaporizer or humidifier to add moisture to your bedroom. Follow the directions for cleaning the machine. When should you call for help? Call your doctor now or seek immediate medical care if:    · You have new or worse trouble swallowing.     · Your sore throat gets much worse on one side. Watch closely for changes in your health, and be sure to contact your doctor if you do not get better as expected. Where can you learn more? Go to https://SimpliVTgaleContraFect.BoostUp. org and sign in to your [employer] account. Enter Y990 in the [employer] box to learn more about \"Sore Throat: Care Instructions. \"     If you do not have an account, please click on the \"Sign Up Now\" link.   Current as of: April 15, 2020               Content Version: 12.6  © 9660-8384 [employer], [employer]orporated. Care instructions adapted under license by [facility] ([facility]). If you have questions about a medical condition or this instruction, always ask your healthcare professional. [address] any warranty or liability for your use of this information.

## 2020-10-14 NOTE — TELEPHONE ENCOUNTER
Urgent Care called, pt running fever, sore throat.  [facility] instructed appt would be cancelled and to advise pt to call when she was better to reschedule

## 2020-10-14 NOTE — PROGRESS NOTES
capsule Take 25 mg by mouth every 6 hours as needed for Itching      EPINEPHrine (EPIPEN 2-GABBY) 0.3 MG/0.3ML SOAJ injection Inject 0.3 mLs into the muscle once for 1 dose Use as directed for allergic reaction 2 each 0     No current facility-administered medications for this visit. She is allergic to other and ibuprofen. .    She  reports that she has never smoked. She has never used smokeless tobacco.      Objective:    Vitals:    10/14/20 0846   BP: 110/70   Pulse: 73   Resp: 18   Temp: 96.8 °F (36 °C)   TempSrc: Tympanic   SpO2: 99%   Weight: 208 lb (94.3 kg)   Height: 5' 3\" (1.6 m)     Body mass index is 36.85 kg/m². Review of Systems   Constitutional: Positive for chills. Negative for appetite change, fatigue and fever. HENT: Positive for congestion, ear pain (right ear pain), postnasal drip (chronic) and sore throat. Negative for rhinorrhea and sinus pressure. Respiratory: Positive for cough. Negative for shortness of breath and wheezing. Cardiovascular: Negative. Negative for chest pain. Gastrointestinal: Negative. Musculoskeletal: Negative for myalgias. Skin: Negative for rash. Allergic/Immunologic: Positive for environmental allergies. Neurological: Negative for headaches. Physical Exam  Vitals signs and nursing note reviewed. Constitutional:       Appearance: She is well-developed. HENT:      Head: Normocephalic. Jaw: There is normal jaw occlusion. Right Ear: Tympanic membrane, ear canal and external ear normal.      Left Ear: Tympanic membrane, ear canal and external ear normal.      Nose: Congestion present. Right Turbinates: Swollen and pale. Left Turbinates: Swollen and pale. Right Sinus: No maxillary sinus tenderness or frontal sinus tenderness. Left Sinus: No maxillary sinus tenderness or frontal sinus tenderness. Mouth/Throat:      Lips: Pink. Mouth: Mucous membranes are moist.      Pharynx: Oropharynx is clear.  Uvula midline. Posterior oropharyngeal erythema present. Tonsils: 2+ on the right. 2+ on the left. Eyes:      Pupils: Pupils are equal, round, and reactive to light. Neck:      Musculoskeletal: Normal range of motion and neck supple. Cardiovascular:      Rate and Rhythm: Normal rate and regular rhythm. Heart sounds: Normal heart sounds. Pulmonary:      Effort: Pulmonary effort is normal.      Breath sounds: Normal breath sounds and air entry. Lymphadenopathy:      Head:      Right side of head: Tonsillar adenopathy present. Left side of head: Tonsillar adenopathy present. Skin:     General: Skin is warm and dry. Neurological:      Mental Status: She is alert and oriented to person, place, and time. Psychiatric:         Behavior: Behavior normal.         Thought Content: Thought content normal.       Assessment and Plan:    Results for POC orders placed in visit on 10/14/20   POCT rapid strep A   Result Value Ref Range    Strep A Ag None Detected None Detected        Diagnosis Orders   1. Pharyngitis, unspecified etiology  POCT rapid strep A   2. Seasonal allergies     3. Postnasal discharge       I recommended alternating tylenol and ibuprofen for pain/fever, increase fluid intake, and eating popsicles and jello for comfort. Continue Claritin daily and use Flonase daily for sinus symptoms. Saline nasal rinse daily. Warm salt water gargles. Use Chloraseptic spray as needed for sore throat. Follow up with PCP if symptoms not improved or worsen. The use, risks, benefits, and side effects of prescribed or recommended medications were discussed. All questions were answered and the patient/caregiver voiced understanding. No orders of the defined types were placed in this encounter.         Electronically signed by JEFFERY Piper CNP on 10/14/20 at 9:04 AM EDT

## 2020-10-27 ENCOUNTER — HOSPITAL ENCOUNTER (OUTPATIENT)
Dept: INTERVENTIONAL RADIOLOGY/VASCULAR | Age: 25
Discharge: HOME | End: 2020-10-29
Payer: COMMERCIAL

## 2020-10-27 ENCOUNTER — OFFICE VISIT (OUTPATIENT)
Dept: PRIMARY CARE CLINIC | Age: 25
End: 2020-10-27
Payer: COMMERCIAL

## 2020-10-27 ENCOUNTER — HOSPITAL ENCOUNTER (OUTPATIENT)
Age: 25
Discharge: HOME | End: 2020-10-29
Payer: COMMERCIAL

## 2020-10-27 VITALS
BODY MASS INDEX: 37.02 KG/M2 | OXYGEN SATURATION: 97 % | HEART RATE: 88 BPM | SYSTOLIC BLOOD PRESSURE: 110 MMHG | WEIGHT: 209 LBS | DIASTOLIC BLOOD PRESSURE: 70 MMHG

## 2020-10-27 PROBLEM — Z15.89 HETEROZYGOUS FOR MTHFR GENE MUTATION: Status: ACTIVE | Noted: 2020-10-27

## 2020-10-27 PROCEDURE — 99214 OFFICE O/P EST MOD 30 MIN: CPT | Performed by: FAMILY MEDICINE

## 2020-10-27 PROCEDURE — 93971 EXTREMITY STUDY: CPT

## 2020-10-27 PROCEDURE — 99212 OFFICE O/P EST SF 10 MIN: CPT | Performed by: FAMILY MEDICINE

## 2020-10-27 NOTE — PROGRESS NOTES
[facility]             [address] [place] [address] [place], [address]                        Telephone [phone]             Fax [phone]       Carl Nolen  1995  MRN:  [id number]  Date of visit:  10/27/2020     Subjective:    Carl Nolen is a 30 y.o.  female who presents to [facility] today (10/27/2020) for evaluation of:  Leg Pain (L lower leg started tonight. burning sensation. hx of DVT, current pain does not feel like previous DVT. no c/o SOB, chest pain. Neg homans. wears compression stockings. )      She states that she has had a burning sensation in the left leg after eating a piece of cake this evening. She is concerned about a possible DVT. She has a history of a DVT. She states that this occurred when she was on oral contraceptives, and she no longer takes oral contraceptives. She states that both her mother and her father have had blood clots. She is heterozygous for a MTHFR gene mutation.     She has the following problem list:  Patient Active Problem List   Diagnosis    Psoriasis    Acne    Obesity    Myopia of both eyes with astigmatism    Anisocoria    GERD (gastroesophageal reflux disease)    LPRD (laryngopharyngeal reflux disease)    Social anxiety disorder    Cyst of right ovary    Vitamin D deficiency    H/O deep venous thrombosis        Current medications are:  Current Outpatient Medications   Medication Sig Dispense Refill    diphenhydrAMINE (BENADRYL) 25 MG capsule Take 25 mg by mouth      diphenhydrAMINE (BENADRYL) 25 MG capsule Take 25 mg by mouth every 6 hours as needed for Itching      acetaminophen (TYLENOL) 325 MG tablet Take 650 mg by mouth every 6 hours as needed for Pain      EPINEPHrine (EPIPEN 2-GABBY) 0.3 MG/0.3ML SOAJ injection Inject 0.3 mLs into the muscle once for 1 dose Use as directed for allergic reaction 2 each 0     No current facility-administered medications for this visit. She is allergic to Purex detergent and ibuprofen. She  reports that she has never smoked. She has never used smokeless tobacco.      Objective:    Vitals:    10/27/20 1840   BP: 110/70   Site: Right Upper Arm   Position: Sitting   Cuff Size: Large Adult   Pulse: 88   SpO2: 97%   Weight: 209 lb (94.8 kg)     Body mass index is 37.02 kg/m². Obese  female healthy-appearing, alert, no distress, cooperative. Neck supple. No adenopathy. Chest is clear to auscultation, no wheezes, rales, or rhonchi. Heart sounds are regular rate and rhythm, no murmurs. Lower extremities have no edema. There is tenderness to palpation of the left calf posteriorly. There is no erythema or increased warmth of the left leg. Results of the Doppler done today were reviewed with the patient:  Taz Pulido Lower Extremity Venous Left    Result Date: 10/27/2020  EXAMINATION: DUPLEX VENOUS ULTRASOUND OF THE LEFT LOWER EXTREMITY 10/27/2020 7:05 pm TECHNIQUE: Duplex ultrasound and Doppler images were obtained of the left lower extremity. COMPARISON: 08/20/2018 HISTORY: ORDERING SYSTEM PROVIDED HISTORY: Pain in left leg TECHNOLOGIST PROVIDED HISTORY: pain left leg, history of DVT in left leg two years ago FINDINGS: The visualized veins of the left lower extremity are patent and free of echogenic thrombus. The veins are normally compressible and have normal phasic flow. No evidence of DVT in the left lower extremity. Assessment and Plan:    1. Pain in left leg  2. Heterozygous for MTHFR gene mutation  3. H/O deep venous thrombosis  Results of the Doppler were reviewed with the patient. She was advised to follow up if symptoms worsen or do not resolve.        (Please note that portions of this note were completed with a voice-recognition program. Efforts were made to edit the dictation but occasionally words are mis-transcribed.)

## 2020-11-27 ENCOUNTER — OFFICE VISIT (OUTPATIENT)
Dept: PRIMARY CARE CLINIC | Age: 25
End: 2020-11-27
Payer: COMMERCIAL

## 2020-11-27 ENCOUNTER — HOSPITAL ENCOUNTER (OUTPATIENT)
Age: 25
Setting detail: SPECIMEN
Discharge: HOME | End: 2020-11-27
Payer: COMMERCIAL

## 2020-11-27 VITALS
HEART RATE: 78 BPM | WEIGHT: 213 LBS | OXYGEN SATURATION: 98 % | RESPIRATION RATE: 20 BRPM | SYSTOLIC BLOOD PRESSURE: 120 MMHG | BODY MASS INDEX: 37.73 KG/M2 | TEMPERATURE: 97 F | DIASTOLIC BLOOD PRESSURE: 60 MMHG

## 2020-11-27 LAB
-: NORMAL
AMORPHOUS: NORMAL
BACTERIA: NORMAL
BILIRUBIN URINE: NEGATIVE
CASTS UA: NORMAL /LPF (ref 0–2)
COLOR: ABNORMAL
COMMENT UA: ABNORMAL
CRYSTALS, UA: NORMAL /HPF
EPITHELIAL CELLS UA: NORMAL /HPF (ref 0–5)
GLUCOSE URINE: NEGATIVE
KETONES, URINE: NEGATIVE
LEUKOCYTE ESTERASE, URINE: NEGATIVE
MUCUS: NORMAL
NITRITE, URINE: NEGATIVE
OTHER OBSERVATIONS UA: NORMAL
PH UA: 6.5 (ref 5–6)
PROTEIN UA: NEGATIVE
RBC UA: NORMAL /HPF (ref 0–4)
RENAL EPITHELIAL, UA: NORMAL /HPF
SPECIFIC GRAVITY UA: 1.01 (ref 1.01–1.02)
TRICHOMONAS: NORMAL
TURBIDITY: ABNORMAL
URINE HGB: ABNORMAL
UROBILINOGEN, URINE: NORMAL
WBC UA: NORMAL /HPF (ref 0–4)
YEAST: NORMAL

## 2020-11-27 PROCEDURE — 81001 URINALYSIS AUTO W/SCOPE: CPT

## 2020-11-27 PROCEDURE — 99214 OFFICE O/P EST MOD 30 MIN: CPT | Performed by: FAMILY MEDICINE

## 2020-11-27 ASSESSMENT — ENCOUNTER SYMPTOMS
DIARRHEA: 1
SHORTNESS OF BREATH: 0
VOMITING: 0
ABDOMINAL PAIN: 1
NAUSEA: 1

## 2020-11-27 NOTE — PATIENT INSTRUCTIONS
Patient Education        Lightheadedness or Faintness: Care Instructions  Your Care Instructions  Lightheadedness is a feeling that you are about to faint or \"pass out. \" You do not feel as if you or your surroundings are moving. It is different from vertigo, which is the feeling that you or things around you are spinning or tilting. Lightheadedness usually goes away or gets better when you lie down. If lightheadedness gets worse, it can lead to a fainting spell. It is common to feel lightheaded from time to time. Lightheadedness usually is not caused by a serious problem. It often is caused by a short-lasting drop in blood pressure and blood flow to your head that occurs when you get up too quickly from a seated or lying position. Follow-up care is a key part of your treatment and safety. Be sure to make and go to all appointments, and call your doctor if you are having problems. It's also a good idea to know your test results and keep a list of the medicines you take. How can you care for yourself at home? · Lie down for 1 or 2 minutes when you feel lightheaded. After lying down, sit up slowly and remain sitting for 1 to 2 minutes before slowly standing up. · Avoid movements, positions, or activities that have made you lightheaded in the past.  · Get plenty of rest, especially if you have a cold or flu, which can cause lightheadedness. · Make sure you drink plenty of fluids, especially if you have a fever or have been sweating. · Do not drive or put yourself and others in danger while you feel lightheaded. When should you call for help? Call 911 anytime you think you may need emergency care. For example, call if:    · You have symptoms of a stroke. These may include:  ? Sudden numbness, tingling, weakness, or loss of movement in your face, arm, or leg, especially on only one side of your body. ? Sudden vision changes. ? Sudden trouble speaking.   ? Sudden confusion or trouble understanding simple and brittle bones (osteoporosis) in your later years. Children who don't get enough vitamin D may not grow as much as others their age. They also have a chance of getting a rare disease called rickets. It causes weak bones. Vitamin D and calcium are added to many foods. And your body uses sunshine to make its own vitamin D. How much vitamin D do you need? The [employer] recommends that people ages 3 through 79 get 600 IU (international units) every day. Adults 71 and older need 800 IU every day. Blood tests for vitamin D can check your vitamin D level. But there is no standard normal range used by all laboratories. You're likely getting enough vitamin D if your levels are in the range of 20 to 50 ng/mL. How can you get more vitamin D? Foods that contain vitamin D include:  · [place], tuna, and mackerel. These are some of the best foods to eat when you need to get more vitamin D.  · Cheese, egg yolks, and beef liver. These foods have vitamin D in small amounts. · Milk, soy drinks, orange juice, yogurt, margarine, and some kinds of cereal have vitamin D added to them. Some people don't make vitamin D as well as others. They may have to take extra care in getting enough vitamin D. Things that reduce how much vitamin D your body makes include:  · Dark skin, such as many  Americans have. · Age, especially if you are older than 72. · Digestive problems, such as Crohn's or celiac disease. · Liver and kidney disease. Some people who do not get enough vitamin D may need supplements. Are there any risks from taking vitamin D?  · Too much vitamin D:  ? Can damage your kidneys. ? Can cause nausea and vomiting, constipation, and weakness. ? Raises the amount of calcium in your blood. If this happens, you can get confused or have an irregular heart rhythm. · Vitamin D may interact with other medicines.  Tell your doctor about all of the medicines you take, including over-the-counter drugs, herbs, and pills. Tell your doctor about all of your current medical problems. Where can you learn more? Go to https://chpepiceweb.Jibestream. org and sign in to your [facility] account. Enter [id number] in the [facility] box to learn more about \"Learning About Vitamin D. \"     If you do not have an account, please click on the \"Sign Up Now\" link. Current as of: August 22, 2019               Content Version: 12.6  © 9574-8411 [employer]. Care instructions adapted under license by [facility] ([facility]). If you have questions about a medical condition or this instruction, always ask your healthcare professional. [address] any warranty or liability for your use of this information.

## 2020-11-27 NOTE — PROGRESS NOTES
[address]  [address], [address] [place]  [phone]      Claude Bob is a 30 y.o. female who is c/o of Abdominal Pain (light headed, ear pain, started yesterday. Needs work note. )      HPI:     Dizziness   This is a new problem. The current episode started today. The problem has been rapidly improving. Associated symptoms include abdominal pain (having menstrual cramps - bilateral, worse today than yesterday) and nausea. Pertinent negatives include no chills, fever or vomiting. Treatments tried: Midol, increased fluids. Called off work today due to symptoms. Subjective:      Past Medical History:   Diagnosis Date    Acne     history of     Anisocoria     history of     Chickenpox     history of      DVT [facility]deep venous thrombosis) ([facility] [address]     H/O deep venous thrombosis 8/22/2020    In left leg, due to OCPs    Obesity     Psoriasis     Seasonal allergies     Social anxiety disorder     Swollen tonsil     history of     Vitamin D deficiency 12/14/2018      Past Surgical History:   Procedure Laterality Date    COLONOSCOPY  2010    [place] ([place]OWER)      OVARIAN CYST REMOVAL Right 09/27/2016    [facility]PPER [facility]  2010    [facility]    WISDOM TOOTH EXTRACTION         Social History     Tobacco Use    Smoking status: Never Smoker    Smokeless tobacco: Never Used   Substance Use Topics    Alcohol use:  Yes     Alcohol/week: 0.0 standard drinks     Comment: socially      Current Outpatient Medications   Medication Sig Dispense Refill    diphenhydrAMINE (BENADRYL) 25 MG capsule Take 25 mg by mouth every 6 hours as needed for Itching      acetaminophen (TYLENOL) 325 MG tablet Take 650 mg by mouth every 6 hours as needed for Pain      EPINEPHrine (EPIPEN 2-GABBY) 0.3 MG/0.3ML SOAJ injection Inject 0.3 mLs into the muscle once for 1 dose Use as directed for allergic reaction 2 each 0 No current facility-administered medications for this visit. Allergies   Allergen Reactions    Other Other (See Comments)     Purex detergent. Tightness in throat  Bounce Fabric softener. Itching    Ibuprofen Nausea And Vomiting       Review of Systems   Constitutional: Negative for chills and fever. HENT: Positive for ear pain (R ear - started approx 1 week ago; intermittent). Respiratory: Negative for shortness of breath. Gastrointestinal: Positive for abdominal pain (having menstrual cramps - bilateral, worse today than yesterday), diarrhea (had an episode earlier this afternoon) and nausea. Negative for vomiting. Genitourinary: Positive for dysuria (2 episodes today) and frequency. Negative for hematuria. Menstrual problem: LMP 11/26/20. Neurological: Positive for dizziness and light-headedness. Negative for syncope. Objective:     Vitals:    11/27/20 1720   BP: 120/60   Site: Right Upper Arm   Position: Sitting   Cuff Size: Large Adult   Pulse: 78   Resp: 20   Temp: 97 °F (36.1 °C)   TempSrc: Infrared   SpO2: 98%   Weight: 213 lb (96.6 kg)     Physical Exam  Vitals signs and nursing note reviewed. Constitutional:       General: She is not in acute distress. Appearance: She is well-developed. HENT:      Head: Normocephalic and atraumatic. Right Ear: Tympanic membrane, ear canal and external ear normal.      Left Ear: Tympanic membrane, ear canal and external ear normal.      Nose: Nose normal.      Mouth/Throat:      Mouth: Mucous membranes are moist.      Pharynx: Oropharynx is clear. No posterior oropharyngeal erythema. Eyes:      Conjunctiva/sclera: Conjunctivae normal.   Neck:      Musculoskeletal: Neck supple. Cardiovascular:      Rate and Rhythm: Normal rate and regular rhythm. Heart sounds: Normal heart sounds. Pulmonary:      Effort: Pulmonary effort is normal. No respiratory distress. Breath sounds: Normal breath sounds.    Abdominal: General: Bowel sounds are normal. There is no distension. Palpations: Abdomen is soft. Tenderness: There is no abdominal tenderness. Skin:     General: Skin is warm and dry. Neurological:      Mental Status: She is alert and oriented to person, place, and time. Assessment:       Diagnosis Orders   1. Lightheadedness  CBC Auto Differential    Basic Metabolic Panel   2. Urinary frequency  Urinalysis Reflex to Culture   3. Vitamin D deficiency  Vitamin D 25 Hydroxy       Plan: Will check UA today, as pt is also noting some urinary frequency. Will check labs - CBC and BMP. Pt also requesting to have Vit D checked, due to h/o deficiency. Return if symptoms worsen or fail to improve in 2-3 days. Orders Placed This Encounter   Procedures    CBC Auto Differential     Standing Status:   Future     Number of Occurrences:   1     Standing Expiration Date:   11/27/2021    Basic Metabolic Panel     Standing Status:   Future     Number of Occurrences:   1     Standing Expiration Date:   11/27/2021    Urinalysis Reflex to Culture     Standing Status:   Future     Number of Occurrences:   1     Standing Expiration Date:   11/27/2021     Order Specific Question:   SPECIFY(EX-CATH,MIDSTREAM,CYSTO,ETC)? Answer:   clean catch    Vitamin D 25 Hydroxy     Standing Status:   Future     Number of Occurrences:   1     Standing Expiration Date:   11/27/2021     No orders of the defined types were placed in this encounter. Patient given educational materials - see patient instructions. Discussed use, benefit, and side effects of prescribed medications. All patient questions answered. Pt voiced understanding.        Electronically signed by Camille Romo DO on 12/7/2020 at 12:34 AM

## 2020-11-27 NOTE — LETTER
[address] [facility] A department of [place] [address]  Phone: [phone]  Fax: [address] T[address] Wagner,         November 27, 2020     Patient: Debra Argueta   YOB: 1995   Date of Visit: 11/27/2020       To Whom it May Concern:    Juanita Gresham was seen in my clinic on 11/27/2020. Please excuse her from work today. She may return to work on 11/28/20. If you have any questions or concerns, please don't hesitate to call.     Sincerely,         [place] Ranks, DO

## 2020-11-28 ENCOUNTER — HOSPITAL ENCOUNTER (OUTPATIENT)
Dept: LAB | Age: 25
Discharge: HOME | End: 2020-11-28
Payer: COMMERCIAL

## 2020-11-28 LAB
ABSOLUTE EOS #: 0.15 K/UL (ref 0–0.44)
ABSOLUTE IMMATURE GRANULOCYTE: 0.03 K/UL (ref 0–0.3)
ABSOLUTE LYMPH #: 3.32 K/UL (ref 1.1–3.7)
ABSOLUTE MONO #: 0.63 K/UL (ref 0.1–1.2)
ANION GAP SERPL CALCULATED.3IONS-SCNC: 9 MMOL/L (ref 9–17)
BASOPHILS # BLD: 1 % (ref 0–2)
BASOPHILS ABSOLUTE: 0.07 K/UL (ref 0–0.2)
BUN BLDV-MCNC: 12 MG/DL (ref 6–20)
BUN/CREAT BLD: 17 (ref 9–20)
CALCIUM SERPL-MCNC: 9.8 MG/DL (ref 8.6–10.4)
CHLORIDE BLD-SCNC: 104 MMOL/L (ref 98–107)
CO2: 27 MMOL/L (ref 20–31)
CREAT SERPL-MCNC: 0.71 MG/DL (ref 0.5–0.9)
DIFFERENTIAL TYPE: NORMAL
EOSINOPHILS RELATIVE PERCENT: 2 % (ref 1–4)
GFR AFRICAN AMERICAN: >60 ML/MIN
GFR NON-AFRICAN AMERICAN: >60 ML/MIN
GFR SERPL CREATININE-BSD FRML MDRD: ABNORMAL ML/MIN/{1.73_M2}
GFR SERPL CREATININE-BSD FRML MDRD: ABNORMAL ML/MIN/{1.73_M2}
GLUCOSE BLD-MCNC: 102 MG/DL (ref 70–99)
HCT VFR BLD CALC: 43.6 % (ref 36.3–47.1)
HEMOGLOBIN: 14.3 G/DL (ref 11.9–15.1)
IMMATURE GRANULOCYTES: 0 %
LYMPHOCYTES # BLD: 37 % (ref 24–43)
MCH RBC QN AUTO: 30 PG (ref 25.2–33.5)
MCHC RBC AUTO-ENTMCNC: 32.8 G/DL (ref 25.2–33.5)
MCV RBC AUTO: 91.4 FL (ref 82.6–102.9)
MONOCYTES # BLD: 7 % (ref 3–12)
NRBC AUTOMATED: 0 PER 100 WBC
PDW BLD-RTO: 12.2 % (ref 11.8–14.4)
PLATELET # BLD: 325 K/UL (ref 138–453)
PLATELET ESTIMATE: NORMAL
PMV BLD AUTO: 8.4 FL (ref 8.1–13.5)
POTASSIUM SERPL-SCNC: 4.3 MMOL/L (ref 3.7–5.3)
RBC # BLD: 4.77 M/UL (ref 3.95–5.11)
RBC # BLD: NORMAL 10*6/UL
SEG NEUTROPHILS: 53 % (ref 36–65)
SEGMENTED NEUTROPHILS ABSOLUTE COUNT: 4.76 K/UL (ref 1.5–8.1)
SODIUM BLD-SCNC: 140 MMOL/L (ref 135–144)
VITAMIN D 25-HYDROXY: 13.3 NG/ML (ref 30–100)
WBC # BLD: 9 K/UL (ref 3.5–11.3)
WBC # BLD: NORMAL 10*3/UL

## 2020-11-28 PROCEDURE — 82306 VITAMIN D 25 HYDROXY: CPT

## 2020-11-28 PROCEDURE — 80048 BASIC METABOLIC PNL TOTAL CA: CPT

## 2020-11-28 PROCEDURE — 85025 COMPLETE CBC W/AUTO DIFF WBC: CPT

## 2020-11-28 PROCEDURE — 36415 COLL VENOUS BLD VENIPUNCTURE: CPT

## 2020-12-01 ENCOUNTER — TELEPHONE (OUTPATIENT)
Dept: FAMILY MEDICINE CLINIC | Age: 25
End: 2020-12-01

## 2020-12-01 ENCOUNTER — PATIENT MESSAGE (OUTPATIENT)
Dept: PRIMARY CARE CLINIC | Age: 25
End: 2020-12-01

## 2020-12-02 NOTE — TELEPHONE ENCOUNTER
From: Johnnie Powell  To: Liliane Craig DO  Sent: 12/1/2020 9:06 PM EST  Subject: Non-Urgent Medical Question    Good evening Dr. Nate Heath team,    I was just curious which vitamin D I should be taking 2,000 IUs of. I bought vitamin D3, which is what I have been using for the past week, but I noticed I was put in 50,000IUs of D2 by another doctor at some point. So, what should I take: D2 or D3? Thank you for your time,  Rebekah DUFFY

## 2020-12-15 ENCOUNTER — HOSPITAL ENCOUNTER (EMERGENCY)
Age: 25
Discharge: HOME | End: 2020-12-15
Attending: EMERGENCY MEDICINE
Payer: COMMERCIAL

## 2020-12-15 VITALS
WEIGHT: 210 LBS | BODY MASS INDEX: 37.21 KG/M2 | OXYGEN SATURATION: 99 % | HEART RATE: 69 BPM | RESPIRATION RATE: 16 BRPM | DIASTOLIC BLOOD PRESSURE: 91 MMHG | TEMPERATURE: 97.8 F | SYSTOLIC BLOOD PRESSURE: 121 MMHG | HEIGHT: 63 IN

## 2020-12-15 PROCEDURE — 6370000000 HC RX 637 (ALT 250 FOR IP): Performed by: EMERGENCY MEDICINE

## 2020-12-15 PROCEDURE — 99285 EMERGENCY DEPT VISIT HI MDM: CPT

## 2020-12-15 RX ORDER — FAMOTIDINE 20 MG/1
20 TABLET, FILM COATED ORAL 2 TIMES DAILY
Qty: 30 TABLET | Refills: 0 | Status: SHIPPED | OUTPATIENT
Start: 2020-12-15 | End: 2020-12-30 | Stop reason: ALTCHOICE

## 2020-12-15 RX ORDER — FAMOTIDINE 20 MG/1
20 TABLET, FILM COATED ORAL ONCE
Status: COMPLETED | OUTPATIENT
Start: 2020-12-15 | End: 2020-12-15

## 2020-12-15 RX ORDER — PREDNISONE 20 MG/1
20 TABLET ORAL 2 TIMES DAILY
Qty: 20 TABLET | Refills: 0 | Status: SHIPPED | OUTPATIENT
Start: 2020-12-15 | End: 2020-12-25

## 2020-12-15 RX ORDER — PREDNISONE 20 MG/1
40 TABLET ORAL ONCE
Status: COMPLETED | OUTPATIENT
Start: 2020-12-15 | End: 2020-12-15

## 2020-12-15 RX ADMIN — PREDNISONE 40 MG: 20 TABLET ORAL at 03:13

## 2020-12-15 RX ADMIN — FAMOTIDINE 20 MG: 20 TABLET, FILM COATED ORAL at 03:13

## 2020-12-15 ASSESSMENT — ENCOUNTER SYMPTOMS
ABDOMINAL PAIN: 0
EYE PAIN: 0
WHEEZING: 0
CONSTIPATION: 0
STRIDOR: 0
SHORTNESS OF BREATH: 1
COLOR CHANGE: 0
EYE DISCHARGE: 0
VOMITING: 0
DIARRHEA: 0
EYE REDNESS: 0
COUGH: 0
NAUSEA: 0
SORE THROAT: 0

## 2020-12-15 NOTE — ED PROVIDER NOTES
remainder of the review of systems was reviewed and negative.        PAST MEDICAL HISTORY     Past Medical History:   Diagnosis Date    Acne     history of     Anisocoria     history of     Chickenpox     history of      DVT [facility] thrombosis) ([facility] [address]     H/O deep venous thrombosis 8/22/2020    In left leg, due to OCPs    Obesity     Psoriasis     Seasonal allergies     Social anxiety disorder     Swollen tonsil     history of     Vitamin D deficiency 12/14/2018       SURGICAL HISTORY       Past Surgical History:   Procedure Laterality Date    [facility]    ENDOSCOPIC ULTRASOUND (LOWER)      OVARIAN CYST REMOVAL Right 09/27/2016    UPPER GASTROINTESTINAL ENDOSCOPY  2010    [address] [place] [address] Road [facility]         CURRENT MEDICATIONS       Discharge Medication List as of 12/15/2020  3:34 AM      CONTINUE these medications which have NOT CHANGED    Details   diphenhydrAMINE (BENADRYL) 25 MG capsule Take 25 mg by mouth every 6 hours as needed for ItchingHistorical Med      EPINEPHrine (EPIPEN 2-GABBY) 0.3 MG/0.3ML SOAJ injection Inject 0.3 mLs into the muscle once for 1 dose Use as directed for allergic reaction, Disp-2 each, R-0Print      acetaminophen (TYLENOL) 325 MG tablet Take 650 mg by mouth every 6 hours as needed for PainHistorical Med             ALLERGIES     Other and Ibuprofen    FAMILY HISTORY           Problem Relation Age of Onset    Other Mother         acne    Depression Mother     Deep Vein Thrombosis Mother     Other Father         acne    Depression Father     Deep Vein Thrombosis Father     Thyroid Disease Maternal Grandmother     Diabetes Paternal Grandfather     Thyroid Disease Maternal Uncle     Thyroid Disease Other     Glaucoma Neg Hx      Family Status   Relation Name Status    Mother Leni Alive    Father Patricia Ross Alive    MGM  Alive    MGF  Alive    PGM  Alive    PGF  Alive    MUnc  Alive    Other  (Not Specified)    Neg Hx  (Not Specified)        SOCIAL HISTORY      reports that she has never smoked. She has never used smokeless tobacco. She reports current alcohol use. She reports that she does not use drugs. PHYSICAL EXAM    (up to 7 for level 4, 8 or more for level 5)     ED Triage Vitals [12/15/20 0250]   BP Temp Temp src Pulse Resp SpO2 Height Weight   134/73 97.8 °F (36.6 °C) -- 79 16 98 % 5' 3\" (1.6 m) 210 lb (95.3 kg)     Physical Exam  Vitals signs and nursing note reviewed. Constitutional:       General: She is not in acute distress. Appearance: She is well-developed. She is not ill-appearing, toxic-appearing or diaphoretic. HENT:      Head: Normocephalic and atraumatic. Right Ear: External ear normal.      Left Ear: External ear normal.      Nose: Nose normal.      Mouth/Throat:      Mouth: Mucous membranes are moist.   Eyes:      General:         Right eye: No discharge. Left eye: No discharge. Conjunctiva/sclera: Conjunctivae normal.      Pupils: Pupils are equal, round, and reactive to light. Neck:      Musculoskeletal: Normal range of motion and neck supple. Cardiovascular:      Rate and Rhythm: Normal rate and regular rhythm. Heart sounds: Normal heart sounds. No murmur. Pulmonary:      Effort: Pulmonary effort is normal. No respiratory distress. Breath sounds: Normal breath sounds. No wheezing, rhonchi or rales. Chest:      Chest wall: No tenderness. Abdominal:      General: Bowel sounds are normal. There is no distension. Palpations: Abdomen is soft. There is no mass. Tenderness: There is no abdominal tenderness. There is no guarding or rebound. Musculoskeletal: Normal range of motion. Right lower leg: No edema. Left lower leg: No edema. Comments: No palpable cords   Skin:     General: Skin is warm. Coloration: Skin is not pale. Findings: No rash. Neurological:      General: No focal deficit present.       Mental Status: She is alert and oriented to person, place, and time. Motor: No abnormal muscle tone. Psychiatric:         Mood and Affect: Mood is anxious. Behavior: Behavior normal.         DIAGNOSTIC RESULTS     EKG: All EKG's are interpreted by the Emergency Department Physician who either signs or Co-signs this chart in the absence of a cardiologist.    none    RADIOLOGY:   Non-plain film images such as CT, Ultrasound and MRI are read by the radiologist. Plain radiographic images are visualized and preliminarily interpreted by the emergency physician with the below findings:    Interpretation per the Radiologist below, if available at the time of this note:    No orders to display         ED BEDSIDE ULTRASOUND:   Performed by ED Physician - none    LABS:  Labs Reviewed - No data to display    All other labs were within normal range or not returned as of this dictation. EMERGENCY DEPARTMENT COURSE and DIFFERENTIAL DIAGNOSIS/MDM:   Vitals:    Vitals:    12/15/20 0250 12/15/20 0348   BP: 134/73 (!) 121/91   Pulse: 79 69   Resp: 16 16   Temp: 97.8 °F (36.6 °C)    SpO2: 98% 99%   Weight: 210 lb (95.3 kg)    Height: 5' 3\" (1.6 m)      We did discuss treating this as an allergic reaction. She is agreeable. I have answered any questions she has at this time. She knows what to return to the emergency department for as well. CONSULTS:  None    PROCEDURES:  None    FINAL IMPRESSION      1.  Allergic reaction, initial encounter          DISPOSITION/PLAN   DISPOSITION Decision To Discharge 12/15/2020 03:33:23 AM      PATIENT REFERRED TO:  Brandon Walters MD  [address] [place], [place] [address]is [place]  [phone]    Call in 1 day        DISCHARGE MEDICATIONS:  Discharge Medication List as of 12/15/2020  3:34 AM      START taking these medications    Details   predniSONE (DELTASONE) 20 MG tablet Take 1 tablet by mouth 2 times daily for 10 days, Disp-20 tablet, R-0Print      famotidine (PEPCID) 20 MG tablet Take 1 tablet by

## 2020-12-18 ENCOUNTER — PATIENT MESSAGE (OUTPATIENT)
Dept: FAMILY MEDICINE CLINIC | Age: 25
End: 2020-12-18

## 2020-12-18 ENCOUNTER — OFFICE VISIT (OUTPATIENT)
Dept: FAMILY MEDICINE CLINIC | Age: 25
End: 2020-12-18
Payer: COMMERCIAL

## 2020-12-18 VITALS
BODY MASS INDEX: 37.92 KG/M2 | HEIGHT: 63 IN | DIASTOLIC BLOOD PRESSURE: 62 MMHG | OXYGEN SATURATION: 97 % | WEIGHT: 214 LBS | HEART RATE: 78 BPM | SYSTOLIC BLOOD PRESSURE: 110 MMHG | TEMPERATURE: 97.8 F

## 2020-12-18 PROCEDURE — 99214 OFFICE O/P EST MOD 30 MIN: CPT | Performed by: FAMILY MEDICINE

## 2020-12-18 NOTE — TELEPHONE ENCOUNTER
From: Roni Perry  To: Cyrus Beck MD  Sent: 12/18/2020 12:50 PM EST  Subject: Prescription Question    Good afternoon team,    I had an appointment scheduled for an allergen test and it was asked for me to keep an epipen on me at all times. During that time I had the original prescription, the epipens were on back order. I tried to get another one, but it's no longer in the system. Can I get another epipen prescription sent to [address] [place] [address]? Thank you for your time,  Rebekah DUFFY

## 2020-12-18 NOTE — PROGRESS NOTES
 Smoking status: Never Smoker    Smokeless tobacco: Never Used   Substance and Sexual Activity    Alcohol use: Yes     Alcohol/week: 0.0 standard drinks     Comment: socially    Drug use: No    Sexual activity: Yes     Partners: Male     Birth control/protection: Pill   Lifestyle    Physical activity     Days per week: Not on file     Minutes per session: Not on file    Stress: Not on file   Relationships    Social connections     Talks on phone: Not on file     Gets together: Not on file     Attends Protestant service: Not on file     Active member of club or organization: Not on file     Attends meetings of clubs or organizations: Not on file     Relationship status: Not on file    Intimate partner violence     Fear of current or ex partner: Not on file     Emotionally abused: Not on file     Physically abused: Not on file     Forced sexual activity: Not on file   Other Topics Concern    Not on file   Social History Narrative    Not on file       Current Outpatient Medications   Medication Sig Dispense Refill    predniSONE (DELTASONE) 20 MG tablet Take 1 tablet by mouth 2 times daily for 10 days 20 tablet 0    famotidine (PEPCID) 20 MG tablet Take 1 tablet by mouth 2 times daily 30 tablet 0    acetaminophen (TYLENOL) 325 MG tablet Take 650 mg by mouth every 6 hours as needed for Pain      EPINEPHrine (EPIPEN 2-GABBY) 0.3 MG/0.3ML SOAJ injection Inject 0.3 mLs into the muscle once for 1 dose Use as directed for allergic reaction 2 each 0     No current facility-administered medications for this visit. Allergies   Allergen Reactions    Other Other (See Comments)     Purex detergent. Tightness in throat  Bounce Fabric softener.  Itching    Ibuprofen Nausea And Vomiting       REVIEW OF SYSTEMS:  General: No fevers, chills, change in weight  HEENT: No double vision, blurry vision, runny nose, sore throat, tinnitus  Cardio: No chest pain, palpitations, VEGAS, edema, PND Pulmonary: No cough, hemoptysis, SOB  GI: No nausea, vomiting, dysphagia, odynophagia, diarrhea, constipation. : No dysuria, hematuria, urgency, incontinence  Musculoskeletal: No muscle or joint aches, no joint swelling  Neuro: No dizziness/lightheadedness, no seizures  Endocrine: No polyuria, polydipsia, polyphagia, no temperature intolerance  Skin:Has psoriasis. No problems with ADLs  Sleep: good  Psychiatric: No depression,has some anxiety    PHYSICAL EXAM:  VS:  /62   Pulse 78   Temp 97.8 °F (36.6 °C)   Ht 5' 3\" (1.6 m)   Wt 214 lb (97.1 kg)   LMP 11/26/2020   SpO2 97%   BMI 37.91 kg/m²   General:  Alert and oriented, NAD  HEENT:  TMs, JOSE, EOMI, Conjunctivae clear       Throat currently clear. NECK:  Supple without adenopathy or thyromegaly, no carotid bruits  LUNGS:  CTA all fields  HEART:  RRR without M, R, or G  ABDOMEN:  Soft and nontender without palpable abnormalities  EXTREMITIES:  Without clubbing, cyanosis, or edema, no calf tenderness  NEURO:  No focal deficits. SKIN: Psoriatic lisionS in knees and scalp    ASSESSMENT/PLAN:     Diagnosis Orders   1. Food allergy  Elyssa Whitmore, DAHLIA, Allergy, [place]   2. Psoriasis         No orders of the defined types were placed in this encounter. Requested Prescriptions      No prescriptions requested or ordered in this encounter   Patient was prescribed Epipen in the ER has not picked it up yet. carry it at all times. Referral to allergist for allergy testing. Continue claritin prn. Avoid allergy causing food  F/u with PCP  Return if symptoms worsen or fail to improve.     Electronically signed by Isabell Villegas MD

## 2020-12-18 NOTE — TELEPHONE ENCOUNTER
Rebekah called requesting a refill of the below medication which has been pended for you:     Requested Prescriptions     Pending Prescriptions Disp Refills    EPINEPHrine (EPIPEN 2-GABBY) 0.3 MG/0.3ML SOAJ injection 2 each 0     Sig: Inject 0.3 mLs into the muscle once for 1 dose Use as directed for allergic reaction       Last Appointment Date: 12/5/2018  Next Appointment Date: Visit date not found    Allergies   Allergen Reactions    Other Other (See Comments)     Purex detergent. Tightness in throat  Bounce Fabric softener.  Itching    Ibuprofen Nausea And Vomiting

## 2020-12-21 RX ORDER — EPINEPHRINE 0.3 MG/.3ML
0.3 INJECTION SUBCUTANEOUS ONCE
Qty: 2 EACH | Refills: 0 | Status: SHIPPED | OUTPATIENT
Start: 2020-12-21 | End: 2020-12-30 | Stop reason: ALTCHOICE

## 2020-12-22 ENCOUNTER — APPOINTMENT (OUTPATIENT)
Dept: GENERAL RADIOLOGY | Age: 25
End: 2020-12-22
Payer: COMMERCIAL

## 2020-12-22 ENCOUNTER — HOSPITAL ENCOUNTER (EMERGENCY)
Age: 25
Discharge: HOME | End: 2020-12-22
Attending: EMERGENCY MEDICINE
Payer: COMMERCIAL

## 2020-12-22 ENCOUNTER — TELEPHONE (OUTPATIENT)
Dept: FAMILY MEDICINE CLINIC | Age: 25
End: 2020-12-22

## 2020-12-22 VITALS
TEMPERATURE: 97.8 F | WEIGHT: 208 LBS | SYSTOLIC BLOOD PRESSURE: 126 MMHG | RESPIRATION RATE: 16 BRPM | OXYGEN SATURATION: 97 % | DIASTOLIC BLOOD PRESSURE: 80 MMHG | HEART RATE: 94 BPM | BODY MASS INDEX: 36.86 KG/M2 | HEIGHT: 63 IN

## 2020-12-22 PROCEDURE — 71045 X-RAY EXAM CHEST 1 VIEW: CPT

## 2020-12-22 PROCEDURE — 99285 EMERGENCY DEPT VISIT HI MDM: CPT

## 2020-12-22 PROCEDURE — 6370000000 HC RX 637 (ALT 250 FOR IP): Performed by: EMERGENCY MEDICINE

## 2020-12-22 RX ORDER — HYOSCYAMINE SULFATE 0.12 MG/1
0.12 TABLET SUBLINGUAL 3 TIMES DAILY PRN
Qty: 15 EACH | Refills: 0 | Status: SHIPPED | OUTPATIENT
Start: 2020-12-22 | End: 2020-12-30 | Stop reason: ALTCHOICE

## 2020-12-22 RX ORDER — LORAZEPAM 0.5 MG/1
0.5 TABLET ORAL ONCE
Status: COMPLETED | OUTPATIENT
Start: 2020-12-22 | End: 2020-12-22

## 2020-12-22 RX ADMIN — LORAZEPAM 0.5 MG: 0.5 TABLET ORAL at 13:07

## 2020-12-22 ASSESSMENT — PAIN DESCRIPTION - PAIN TYPE: TYPE: ACUTE PAIN

## 2020-12-22 ASSESSMENT — ENCOUNTER SYMPTOMS
DIARRHEA: 1
VOMITING: 0
SHORTNESS OF BREATH: 0
CHEST TIGHTNESS: 1
SORE THROAT: 0

## 2020-12-22 ASSESSMENT — PAIN SCALES - GENERAL
PAINLEVEL_OUTOF10: 6
PAINLEVEL_OUTOF10: 3

## 2020-12-22 ASSESSMENT — PAIN DESCRIPTION - DESCRIPTORS: DESCRIPTORS: NUMBNESS

## 2020-12-22 ASSESSMENT — PAIN DESCRIPTION - ORIENTATION: ORIENTATION: LEFT

## 2020-12-22 NOTE — ED PROVIDER NOTES
[facility]  [address]  Phone: [phone]        [facility]  EMERGENCY DEPARTMENT ENCOUNTER      Pt Name: Mike Cage  MRN: [id number]  [place]fs[place] 1995  Date of evaluation: 12/22/2020  Provider: Sherita Quintanilla, [facility]       Chief Complaint   Patient presents with    Diarrhea    Shortness of Breath    Chest Pain         HISTORY OF PRESENT ILLNESS   (Location/Symptom, Timing/Onset,Context/Setting, Quality, Duration, Modifying Factors, Severity)  Note limiting factors. Mike Cage is a 30 y.o. female who presents to the emergency department for the evaluation of diarrhea and chest tightness. Patient states she has had diarrhea the past couple of days. She states she was taking her  to the doctor today and she started developing some chest tightness and shortness of breath as well as some tingling in her fingers consistent with previous anxiety attacks. She decided to get evaluated in the ED. No recent cough or fever. No recent travel. No history of heart disease. She denies hypertension, diabetes, dyslipidemia as well as alcohol or drug use. She does not smoke. She has no abdominal pain and no blood in her diarrhea. No vomiting. Nursing Notes were reviewed. REVIEW OF SYSTEMS    (2-9systems for level 4, 10 or more for level 5)     Review of Systems   Constitutional: Negative for fever. HENT: Negative for sore throat. Respiratory: Positive for chest tightness. Negative for shortness of breath. Cardiovascular: Negative for chest pain. Gastrointestinal: Positive for diarrhea. Negative for vomiting. Genitourinary: Negative for dysuria. Skin: Negative for rash. Neurological: Negative for weakness. All other systems reviewed and are negative. Except asnoted above the remainder of the review of systems was reviewed and negative.        PAST MEDICAL HISTORY     Past Medical Transportation needs     Medical: None     Non-medical: None   Tobacco Use    Smoking status: Never Smoker    Smokeless tobacco: Never Used   Substance and Sexual Activity    Alcohol use: Yes     Alcohol/week: 0.0 standard drinks     Comment: socially    Drug use: No    Sexual activity: Yes     Partners: Male     Birth control/protection: Pill   Lifestyle    Physical activity     Days per week: None     Minutes per session: None    Stress: None   Relationships    Social connections     Talks on phone: None     Gets together: None     Attends Mormon service: None     Active member of club or organization: None     Attends meetings of clubs or organizations: None     Relationship status: None    Intimate partner violence     Fear of current or ex partner: None     Emotionally abused: None     Physically abused: None     Forced sexual activity: None   Other Topics Concern    None   Social History Narrative    None       SCREENINGS             PHYSICAL EXAM    (up to 7 for level 4, 8 or more for level 5)     ED Triage Vitals [12/22/20 1258]   BP Temp Temp Source Pulse Resp SpO2 Height Weight   133/71 97.8 °F (36.6 °C) Tympanic 79 16 98 % 5' 3\" (1.6 m) 208 lb (94.3 kg)       Physical Exam  Vitals signs and nursing note reviewed. Constitutional:       General: She is not in acute distress. Appearance: Normal appearance. She is not ill-appearing or toxic-appearing. HENT:      Head: Normocephalic and atraumatic. Nose: Nose normal. No congestion. Mouth/Throat:      Mouth: Mucous membranes are moist.   Eyes:      General:         Right eye: No discharge. Left eye: No discharge. Conjunctiva/sclera: Conjunctivae normal.   Neck:      Musculoskeletal: Normal range of motion. Cardiovascular:      Rate and Rhythm: Normal rate and regular rhythm. Pulses: Normal pulses. Heart sounds: Normal heart sounds. No murmur.    Pulmonary:      Effort: Pulmonary effort is normal. No leads was grossly normal.  Interpretation: Normal sinus rhythm, no ST segment changes and no pattern consistent with acute ischemia or infarct    LABS:  Labs Reviewed - No data to display    All other labs were within normal range or not returned as of this dictation. RADIOLOGY:  XR CHEST PORTABLE   Final Result   Unremarkable chest.               ED Course as of Dec 22 1331   Tue Dec 22, 2020   1329 EKG and chest is unremarkable. I suspect she may have a viral syndrome and I talked to her about using fluids at home to replace anything she loses through the diarrhea, talked about following up with her doctor and what to return to the ER for including abdominal pain that is persistent, fever, blood in stool or uncontrolled vomiting    At this time the patient is without objective evidence of an acute process requiring hospitalization or inpatient management. They have remained hemodynamically stable and are stable for discharge with outpatient follow-up. Standard anticipatory guidance given to patient upon discharge. Have given them a specific time frame in which to follow-up and who to follow-up with. I have also advised them that they should return to the emergency department if they get worse, or not getting better or develop any new or concerning symptoms. Patient demonstrates understanding.    [TS]      ED Course User Index  [TS] Emanuel Fox DO         PROCEDURES:  Unless otherwise noted below, none     Procedures    FINAL IMPRESSION      1. Anxiety attack    2.  Diarrhea, unspecified type          DISPOSITION/PLAN   DISPOSITION Decision To Discharge 12/22/2020 01:30:14 PM      PATIENT REFERRED TO:  Malachy Krabbe, MD  [address] [place] [address]  [phone]    In 3 days        DISCHARGE MEDICATIONS:  New Prescriptions    HYOSCYAMINE SULFATE SL (LEVSIN/SL) 0.125 MG SUBL    Place 0.125 mg under the tongue 3 times daily as needed (stomach cramps/diarrhea)          (Please note that portions of this note were completed with a voice recognition program.  Efforts were made to edit the dictations but occasionally words are mis-transcribed.)    Tra Carranza DO (electronically signed)  Board Certified Emergency Physician          Tra Carranza DO  12/22/20 9483

## 2020-12-22 NOTE — TELEPHONE ENCOUNTER
Patient called with anxiety. Encouraged to come to our well side UC. Did voice understanding and did make appointment with her PCP for 12/30/2020.

## 2020-12-30 ENCOUNTER — VIRTUAL VISIT (OUTPATIENT)
Dept: FAMILY MEDICINE CLINIC | Age: 25
End: 2020-12-30
Payer: COMMERCIAL

## 2020-12-30 PROCEDURE — 99213 OFFICE O/P EST LOW 20 MIN: CPT | Performed by: FAMILY MEDICINE

## 2020-12-30 RX ORDER — PAROXETINE HYDROCHLORIDE 20 MG/1
20 TABLET, FILM COATED ORAL DAILY
Qty: 30 TABLET | Refills: 3 | Status: SHIPPED | OUTPATIENT
Start: 2020-12-30 | End: 2021-01-20

## 2020-12-30 RX ORDER — ERGOCALCIFEROL 1.25 MG/1
50000 CAPSULE ORAL WEEKLY
Qty: 12 CAPSULE | Refills: 1 | Status: SHIPPED | OUTPATIENT
Start: 2020-12-30 | End: 2021-01-28

## 2020-12-30 ASSESSMENT — PATIENT HEALTH QUESTIONNAIRE - PHQ9
SUM OF ALL RESPONSES TO PHQ QUESTIONS 1-9: 0
SUM OF ALL RESPONSES TO PHQ QUESTIONS 1-9: 0
SUM OF ALL RESPONSES TO PHQ9 QUESTIONS 1 & 2: 0
2. FEELING DOWN, DEPRESSED OR HOPELESS: 0
1. LITTLE INTEREST OR PLEASURE IN DOING THINGS: 0
SUM OF ALL RESPONSES TO PHQ QUESTIONS 1-9: 0

## 2020-12-30 ASSESSMENT — ENCOUNTER SYMPTOMS
GASTROINTESTINAL NEGATIVE: 1
ALLERGIC/IMMUNOLOGIC NEGATIVE: 1
RESPIRATORY NEGATIVE: 1
EYES NEGATIVE: 1

## 2020-12-30 NOTE — PROGRESS NOTES
2020     Elidia Soni (:  1995) is a 30 y.o. female, here for evaluation of the following medical concerns:    HPI   Acute visit for anxiety. Video teleconference visit scheduled today during covid 19 restrictions. Patient consented to use personal cell phone to connect the visit at home, alone. I am in the office using [employer] software to connect.  with covid. She has tested negative. She describes several stressor recently and some concerns for panic attacks. Shaking, panic, fingertips get numb. Recent ER visit for same. No active treatment in the past. She has been on effexor in the past.  She worries about her health a lot. Simple ideas turn into complex theories about her health. Different focus each time from heart issues to nutrition issues. No clear links between the initial idea and the things she starts to worry about. She describes sob sensation. Aches and pains in her neck and lower back. Numbness in lips and finger tips at times. She reports upset stomach and occasional diarrhea due to anxiety. Past Medical History:   Diagnosis Date    Acne     history of     Anisocoria     history of     Chickenpox     history of      DVT (deep venous thrombosis) ([facility] [address]     H/O deep venous thrombosis 2020    In left leg, due to OCPs    Obesity     Psoriasis     Seasonal allergies     Social anxiety disorder     Swollen tonsil     history of     Vitamin D deficiency 2018     Past Surgical History:   Procedure Laterality Date    [facility]    ENDOSCOPIC ULTRASOUND (LOWER)      OVARIAN CYST REMOVAL Right 2016    UPPER GASTROINTESTINAL ENDOSCOPY  [facility]    WISDOM TOOTH EXTRACTION         Review of Systems   Constitutional: Negative. HENT: Negative. Eyes: Negative. Respiratory: Negative. Cardiovascular: Negative. Gastrointestinal: Negative. Endocrine: Negative. Genitourinary: Negative. Musculoskeletal: Negative. Skin: Negative. Allergic/Immunologic: Negative. Neurological: Negative. Hematological: Negative. Psychiatric/Behavioral: Positive for decreased concentration and sleep disturbance. Negative for behavioral problems, confusion, dysphoric mood, self-injury and suicidal ideas. The patient is nervous/anxious. Prior to Visit Medications    Medication Sig Taking? Authorizing Provider   acetaminophen (TYLENOL) 325 MG tablet Take 650 mg by mouth every 6 hours as needed for Pain Yes Historical Provider, MD        Social History     Tobacco Use    Smoking status: Never Smoker    Smokeless tobacco: Never Used   Substance Use Topics    Alcohol use: Yes     Alcohol/week: 0.0 standard drinks     Comment: socially        There were no vitals filed for this visit. Estimated body mass index is 36.85 kg/m² as calculated from the following:    Height as of 12/22/20: 5' 3\" (1.6 m). Weight as of 12/22/20: 208 lb (94.3 kg). Physical Exam  Constitutional:       General: She is not in acute distress. Appearance: She is not toxic-appearing. Eyes:      Extraocular Movements: Extraocular movements intact. Pulmonary:      Effort: Pulmonary effort is normal. No respiratory distress. Neurological:      Mental Status: She is alert. Mental status is at baseline. Psychiatric:         Attention and Perception: Attention normal.         Mood and Affect: Mood is anxious. Speech: Speech normal.         Behavior: Behavior normal.         Thought Content: Thought content normal.         Judgment: Judgment normal.       Patient-Reported Vitals 12/30/2020   Patient-Reported Weight 200lbs   Patient-Reported Height 5ft 3.5in   Patient-Reported Temperature 97.9        ASSESSMENT/PLAN:  Encounter Diagnosis   Name Primary?  Anxiety Yes     Plan to start paroxetine. Recheck if persistent or progressive. Recheck 2 weeks. Vitamin d deficiency,. She desires the 50,000 unit/week dose of vitamin D. An electronic signature was used to authenticate this note.     --Mirella Contreras MD on 12/30/2020 at 2:50 PM

## 2021-01-06 ENCOUNTER — OFFICE VISIT (OUTPATIENT)
Dept: PRIMARY CARE CLINIC | Age: 26
End: 2021-01-06
Payer: COMMERCIAL

## 2021-01-06 ENCOUNTER — HOSPITAL ENCOUNTER (OUTPATIENT)
Dept: INTERVENTIONAL RADIOLOGY/VASCULAR | Age: 26
Discharge: HOME | End: 2021-01-08
Payer: COMMERCIAL

## 2021-01-06 VITALS
OXYGEN SATURATION: 99 % | SYSTOLIC BLOOD PRESSURE: 105 MMHG | HEIGHT: 63 IN | HEART RATE: 72 BPM | RESPIRATION RATE: 16 BRPM | TEMPERATURE: 98.1 F | DIASTOLIC BLOOD PRESSURE: 82 MMHG | WEIGHT: 206 LBS | BODY MASS INDEX: 36.5 KG/M2

## 2021-01-06 DIAGNOSIS — M79.605 LEG PAIN, BILATERAL: Primary | ICD-10-CM

## 2021-01-06 DIAGNOSIS — M79.605 LEG PAIN, BILATERAL: ICD-10-CM

## 2021-01-06 DIAGNOSIS — Z86.718 HISTORY OF DVT (DEEP VEIN THROMBOSIS): ICD-10-CM

## 2021-01-06 DIAGNOSIS — M79.604 LEG PAIN, BILATERAL: ICD-10-CM

## 2021-01-06 DIAGNOSIS — M79.604 LEG PAIN, BILATERAL: Primary | ICD-10-CM

## 2021-01-06 PROCEDURE — 99213 OFFICE O/P EST LOW 20 MIN: CPT | Performed by: FAMILY MEDICINE

## 2021-01-06 PROCEDURE — 93970 EXTREMITY STUDY: CPT

## 2021-01-06 ASSESSMENT — PATIENT HEALTH QUESTIONNAIRE - PHQ9
SUM OF ALL RESPONSES TO PHQ QUESTIONS 1-9: 0
SUM OF ALL RESPONSES TO PHQ QUESTIONS 1-9: 0
2. FEELING DOWN, DEPRESSED OR HOPELESS: 0
1. LITTLE INTEREST OR PLEASURE IN DOING THINGS: 0

## 2021-01-06 ASSESSMENT — ENCOUNTER SYMPTOMS
EYES NEGATIVE: 1
CHEST TIGHTNESS: 0
GASTROINTESTINAL NEGATIVE: 1
BACK PAIN: 0
WHEEZING: 0
SHORTNESS OF BREATH: 0

## 2021-01-06 NOTE — PROGRESS NOTES
2021     Mally Kahn (:  1995) is a 30 y.o. female, here for evaluation of the following medical concerns:    Leg Pain   The incident occurred more than 1 week ago. There was no injury mechanism (no change in activity, but her legs are just sore. ). The pain is present in the left leg and right leg. The quality of the pain is described as aching. The pain is mild. The pain has been constant since onset. Pertinent negatives include no numbness or tingling. Associated symptoms comments: Had been doing some squats, but that is not new. Patient concerned as she has had a history of DVT in the past.  Somewhat similar symptoms. . The symptoms are aggravated by movement, palpation and weight bearing. Treatments tried: took an aspirin. The treatment provided mild relief. Did review patient's med list, allergies, social history,pmhx and pshx today as noted in the record. Review of Systems   Constitutional: Negative for chills, fatigue and fever. HENT: Negative. Eyes: Negative. Respiratory: Negative for chest tightness, shortness of breath and wheezing. Cardiovascular: Negative. Gastrointestinal: Negative. Musculoskeletal: Positive for myalgias. Negative for arthralgias, back pain, gait problem, joint swelling, neck pain and neck stiffness. Neurological: Negative for tingling and numbness. Prior to Visit Medications    Medication Sig Taking? Authorizing Provider   vitamin D (ERGOCALCIFEROL) 1.25 MG (45325 UT) CAPS capsule Take 1 capsule by mouth once a week Yes Elsie Kanner, MD   PARoxetine (PAXIL) 20 MG tablet Take 1 tablet by mouth daily Yes Elsie Kanner, MD   acetaminophen (TYLENOL) 325 MG tablet Take 650 mg by mouth every 6 hours as needed for Pain Yes Historical Provider, MD        Social History     Tobacco Use    Smoking status: Never Smoker    Smokeless tobacco: Never Used   Substance Use Topics    Alcohol use:  Yes     Alcohol/week: 0.0 standard drinks Comment: socially        Vitals:    01/06/21 1214   BP: 105/82   Site: Right Upper Arm   Position: Sitting   Cuff Size: Large Adult   Pulse: 72   Resp: 16   Temp: 98.1 °F (36.7 °C)   TempSrc: Temporal   SpO2: 99%   Weight: 206 lb (93.4 kg)   Height: 5' 3\" (1.6 m)     Estimated body mass index is 36.49 kg/m² as calculated from the following:    Height as of this encounter: 5' 3\" (1.6 m). Weight as of this encounter: 206 lb (93.4 kg). Physical Exam  Vitals signs and nursing note reviewed. Constitutional:       General: She is not in acute distress. Appearance: She is well-developed. She is not diaphoretic. HENT:      Head: Normocephalic and atraumatic. Eyes:      Conjunctiva/sclera: Conjunctivae normal.   Neck:      Musculoskeletal: Normal range of motion. Pulmonary:      Effort: Pulmonary effort is normal.   Musculoskeletal: Normal range of motion. General: Tenderness present. No swelling. Right lower leg: No edema. Left lower leg: No edema. Comments: Mildly positive Homans sign bilateral lower extremities. Moves the ankles and knees in a normal range of motion without difficulty. No edema noted to the lower extremities. Skin:     General: Skin is warm and dry. Coloration: Skin is not pale. Findings: No erythema or rash. Neurological:      Mental Status: She is alert and oriented to person, place, and time. Psychiatric:         Behavior: Behavior normal.         Thought Content: Thought content normal.         Judgment: Judgment normal.         ASSESSMENT/PLAN:  Encounter Diagnoses   Name Primary?     Leg pain, bilateral Yes    History of DVT (deep vein thrombosis)      Orders Placed This Encounter   Procedures    VL DUP LOWER EXTREMITY VENOUS BILATERAL     Standing Status:   Future     Number of Occurrences:   1     Standing Expiration Date:   1/6/2022     Order Specific Question:   Reason for exam: Answer:   pain in bilateral lower extremities, right worse than left. History of DVT with similar symptoms. VL DUP LOWER EXTREMITY VENOUS BILATERAL  Narrative: EXAMINATION:  DUPLEX VENOUS ULTRASOUND OF THE BILATERAL LOWER EXTREMITIES, 1/6/2021 1:32 pm    TECHNIQUE:  Duplex ultrasound and Doppler images were obtained of the bilateral lower  extremities    COMPARISON:  None. HISTORY:  ORDERING SYSTEM PROVIDED HISTORY: Leg pain, bilateral  TECHNOLOGIST PROVIDED HISTORY: Pain in bilateral lower extremities, right  worse than left. History of DVT with similar symptoms. FINDINGS:  The visualized veins of the bilateral lower extremities are patent and free  of echogenic thrombus. The veins are normally compressible and have normal  phasic flow. Impression: No evidence of DVT in either lower extremity. Did rule out DVT in the lower extremities. Likely pain is muscular in  [place]. Would use tylenol or motrin as needed. Return  if no improvement in symptoms or if any further symptoms arise. No follow-ups on file. An electronic signature was used to authenticate this note.     --Jose Gill DO on 1/6/2021 at 12:30 PM

## 2021-01-07 LAB
EKG ATRIAL RATE: 85 BPM
EKG P AXIS: 53 DEGREES
EKG P-R INTERVAL: 154 MS
EKG Q-T INTERVAL: 382 MS
EKG QRS DURATION: 88 MS
EKG QTC CALCULATION (BAZETT): 454 MS
EKG R AXIS: 55 DEGREES
EKG T AXIS: 31 DEGREES
EKG VENTRICULAR RATE: 85 BPM

## 2021-01-13 ENCOUNTER — HOSPITAL ENCOUNTER (EMERGENCY)
Age: 26
Discharge: HOME | End: 2021-01-13
Attending: EMERGENCY MEDICINE
Payer: COMMERCIAL

## 2021-01-13 VITALS
WEIGHT: 199 LBS | RESPIRATION RATE: 16 BRPM | TEMPERATURE: 98.2 F | BODY MASS INDEX: 35.26 KG/M2 | DIASTOLIC BLOOD PRESSURE: 67 MMHG | OXYGEN SATURATION: 98 % | SYSTOLIC BLOOD PRESSURE: 136 MMHG | HEART RATE: 103 BPM | HEIGHT: 63 IN

## 2021-01-13 DIAGNOSIS — R11.0 NAUSEA: ICD-10-CM

## 2021-01-13 DIAGNOSIS — R19.7 DIARRHEA, UNSPECIFIED TYPE: Primary | ICD-10-CM

## 2021-01-13 LAB
-: NORMAL
ABSOLUTE EOS #: 0.09 K/UL (ref 0–0.44)
ABSOLUTE IMMATURE GRANULOCYTE: 0.03 K/UL (ref 0–0.3)
ABSOLUTE LYMPH #: 2.26 K/UL (ref 1.1–3.7)
ABSOLUTE MONO #: 0.44 K/UL (ref 0.1–1.2)
AMORPHOUS: NORMAL
ANION GAP SERPL CALCULATED.3IONS-SCNC: 9 MMOL/L (ref 9–17)
BACTERIA: NORMAL
BASOPHILS # BLD: 1 % (ref 0–2)
BASOPHILS ABSOLUTE: 0.06 K/UL (ref 0–0.2)
BILIRUBIN URINE: NEGATIVE
BUN BLDV-MCNC: 19 MG/DL (ref 6–20)
BUN/CREAT BLD: 26 (ref 9–20)
CALCIUM SERPL-MCNC: 9.8 MG/DL (ref 8.6–10.4)
CASTS UA: NORMAL /LPF (ref 0–2)
CHLORIDE BLD-SCNC: 102 MMOL/L (ref 98–107)
CO2: 26 MMOL/L (ref 20–31)
COLOR: NORMAL
COMMENT UA: NORMAL
CREAT SERPL-MCNC: 0.72 MG/DL (ref 0.5–0.9)
CRYSTALS, UA: NORMAL /HPF
DIFFERENTIAL TYPE: NORMAL
EOSINOPHILS RELATIVE PERCENT: 1 % (ref 1–4)
EPITHELIAL CELLS UA: NORMAL /HPF (ref 0–5)
GFR AFRICAN AMERICAN: >60 ML/MIN
GFR NON-AFRICAN AMERICAN: >60 ML/MIN
GFR SERPL CREATININE-BSD FRML MDRD: ABNORMAL ML/MIN/{1.73_M2}
GFR SERPL CREATININE-BSD FRML MDRD: ABNORMAL ML/MIN/{1.73_M2}
GLUCOSE BLD-MCNC: 109 MG/DL (ref 70–99)
GLUCOSE URINE: NEGATIVE
HCG QUALITATIVE: NEGATIVE
HCT VFR BLD CALC: 43.9 % (ref 36.3–47.1)
HEMOGLOBIN: 14.5 G/DL (ref 11.9–15.1)
IMMATURE GRANULOCYTES: 0 %
KETONES, URINE: NEGATIVE
LEUKOCYTE ESTERASE, URINE: NEGATIVE
LYMPHOCYTES # BLD: 33 % (ref 24–43)
MCH RBC QN AUTO: 30 PG (ref 25.2–33.5)
MCHC RBC AUTO-ENTMCNC: 33 G/DL (ref 25.2–33.5)
MCV RBC AUTO: 90.9 FL (ref 82.6–102.9)
MONOCYTES # BLD: 7 % (ref 3–12)
MUCUS: NORMAL
NITRITE, URINE: NEGATIVE
NRBC AUTOMATED: 0 PER 100 WBC
OTHER OBSERVATIONS UA: NORMAL
PDW BLD-RTO: 11.9 % (ref 11.8–14.4)
PH UA: 5.5 (ref 5–6)
PLATELET # BLD: 286 K/UL (ref 138–453)
PLATELET ESTIMATE: NORMAL
PMV BLD AUTO: 8.9 FL (ref 8.1–13.5)
POTASSIUM SERPL-SCNC: 4 MMOL/L (ref 3.7–5.3)
PROTEIN UA: NEGATIVE
RBC # BLD: 4.83 M/UL (ref 3.95–5.11)
RBC # BLD: NORMAL 10*6/UL
RBC UA: NORMAL /HPF (ref 0–4)
RENAL EPITHELIAL, UA: NORMAL /HPF
SEG NEUTROPHILS: 58 % (ref 36–65)
SEGMENTED NEUTROPHILS ABSOLUTE COUNT: 3.91 K/UL (ref 1.5–8.1)
SODIUM BLD-SCNC: 137 MMOL/L (ref 135–144)
SPECIFIC GRAVITY UA: 1.01 (ref 1.01–1.02)
TRICHOMONAS: NORMAL
TURBIDITY: NORMAL
URINE HGB: NEGATIVE
UROBILINOGEN, URINE: NORMAL
WBC # BLD: 6.8 K/UL (ref 3.5–11.3)
WBC # BLD: NORMAL 10*3/UL
WBC UA: NORMAL /HPF (ref 0–4)
YEAST: NORMAL

## 2021-01-13 PROCEDURE — 84703 CHORIONIC GONADOTROPIN ASSAY: CPT

## 2021-01-13 PROCEDURE — 99284 EMERGENCY DEPT VISIT MOD MDM: CPT

## 2021-01-13 PROCEDURE — 81001 URINALYSIS AUTO W/SCOPE: CPT

## 2021-01-13 PROCEDURE — 85025 COMPLETE CBC W/AUTO DIFF WBC: CPT

## 2021-01-13 PROCEDURE — 2580000003 HC RX 258: Performed by: EMERGENCY MEDICINE

## 2021-01-13 PROCEDURE — 80048 BASIC METABOLIC PNL TOTAL CA: CPT

## 2021-01-13 RX ORDER — ONDANSETRON 4 MG/1
4 TABLET, FILM COATED ORAL EVERY 8 HOURS PRN
Qty: 20 TABLET | Refills: 0 | Status: SHIPPED | OUTPATIENT
Start: 2021-01-13 | End: 2021-01-20

## 2021-01-13 RX ORDER — 0.9 % SODIUM CHLORIDE 0.9 %
1000 INTRAVENOUS SOLUTION INTRAVENOUS ONCE
Status: COMPLETED | OUTPATIENT
Start: 2021-01-13 | End: 2021-01-13

## 2021-01-13 RX ADMIN — SODIUM CHLORIDE 1000 ML: 9 INJECTION, SOLUTION INTRAVENOUS at 15:24

## 2021-01-13 NOTE — ED PROVIDER NOTES
[facility] ED  [address]  [place] [address]  Phone: [address]      Pt Name: Juma Mendoza  MRN: [id number]  Romygfsam 1995  Date of evaluation: 1/13/2021    CHIEF COMPLAINT       Chief Complaint   Patient presents with    Nausea    Diarrhea       HISTORY OF PRESENT ILLNESS    Juma Mendoza is a 30 y.o. female who presents to the emergency department with nausea and diarrhea since yesterday but has had nausea and diarrhea off and on for the past couple weeks. 3 episodes of loose stool no vomiting. Denies any chest pain or shortness of breath no significant abdominal pain. Her  had Covid a couple weeks ago she is not sure if she got it and then developed diarrheal symptoms. She has been seen here couple of times for anxiety and diarrhea. This is no different denies any blood in her stool. She also feels as if her heart is racing and has palpitations. Denies chest pain. REVIEW OF SYSTEMS       Constitutional: No fevers or chills   HEENT: No sore throat, rhinorrhea, or earache   Eyes: No blurry vision or double vision no drainage   Cardiovascular: No chest pain positive tachycardia positive palpitations  Respiratory: No wheezing or shortness of breath no cough   Gastrointestinal: Positive nausea no vomiting positive diarrhea no constipation no abdominal pain  : No hematuria or dysuria   Musculoskeletal: No swelling or pain   Skin: No rash   Neurological: No focal neurologic complaints, paresthesias, weakness, or headache     PAST MEDICAL HISTORY    has a past medical history of Acne, Anisocoria, Chickenpox, DVT (deep venous thrombosis) ([address] H/O deep venous thrombosis, Obesity, Psoriasis, Seasonal allergies, Social anxiety disorder, Swollen tonsil, and Vitamin D deficiency. SURGICAL HISTORY      has a past surgical history that includes [place] tooth extraction; Endoscopic ultrasonography, GI; Colonoscopy (2010);  Upper gastrointestinal endoscopy (2010); and ovarian cyst removal (Right, 09/27/2016). CURRENT MEDICATIONS       Previous Medications    ACETAMINOPHEN (TYLENOL) 325 MG TABLET    Take 650 mg by mouth every 6 hours as needed for Pain    PAROXETINE (PAXIL) 20 MG TABLET    Take 1 tablet by mouth daily    VITAMIN D (ERGOCALCIFEROL) 1.25 MG (48456 UT) CAPS CAPSULE    Take 1 capsule by mouth once a week       ALLERGIES     is allergic to other and ibuprofen. FAMILY HISTORY     She indicated that her mother is alive. She indicated that her father is alive. She indicated that her maternal grandmother is alive. She indicated that her maternal grandfather is alive. She indicated that her paternal grandmother is alive. She indicated that her paternal grandfather is alive. She indicated that her maternal uncle is alive. She indicated that the status of her other is unknown. She indicated that the status of her neg hx is unknown.     family history includes Deep Vein Thrombosis in her father and mother; Depression in her father and mother; Diabetes in her paternal grandfather; Other in her father and mother; Thyroid Disease in her maternal grandmother, maternal uncle, and another family member. SOCIAL HISTORY      reports that she has never smoked. She has never used smokeless tobacco. She reports current alcohol use. She reports that she does not use drugs.     PHYSICAL EXAM       ED Triage Vitals [01/13/21 1441]   BP Temp Temp Source Pulse Resp SpO2 Height Weight   136/67 98.2 °F (36.8 °C) Tympanic 103 16 98 % 5' 3\" (1.6 m) 199 lb (90.3 kg)       Constitutional: Alert, oriented x3, nontoxic, answering questions appropriately, acting properly for age, in no acute distress   HEENT: Extraocular muscles intact, mucus membranes dry  Neck: Trachea midline   Cardiovascular: Regular rhythm and tachycardic no S3, S4, or murmurs   Respiratory: Clear to auscultation bilaterally no wheezes, rhonchi, rales, no respiratory distress no - 107 mmol/L    CO2 26 20 - 31 mmol/L    Anion Gap 9 9 - 17 mmol/L    GFR Non-African American >60 >60 mL/min    GFR African American >60 >60 mL/min    GFR Comment          GFR Staging NOT REPORTED    Urinalysis Reflex to Culture    Specimen: Urine, clean catch   Result Value Ref Range    Color, UA NOT REPORTED YELLOW    Turbidity UA NOT REPORTED CLEAR    Glucose, Ur NEGATIVE NEGATIVE    Bilirubin Urine NEGATIVE NEGATIVE    Ketones, Urine NEGATIVE NEGATIVE    Specific [place], UA 1.010 1.010 - 1.025    Urine Hgb NEGATIVE NEGATIVE    pH, UA 5.5 5.0 - 6.0    Protein, UA NEGATIVE NEGATIVE    Urobilinogen, Urine Normal Normal    Nitrite, Urine NEGATIVE NEGATIVE    Leukocyte Esterase, Urine NEGATIVE NEGATIVE    Urinalysis Comments NOT REPORTED    HCG Qualitative, Serum   Result Value Ref Range    hCG Qual NEGATIVE NEGATIVE   Microscopic Urinalysis   Result Value Ref Range    -          WBC, UA 0 TO 4 0 - 4 /HPF    RBC, UA 0 TO 4 0 - 4 /HPF    Casts UA NOT REPORTED 0 - 2 /LPF    Crystals, UA NOT REPORTED None /HPF    Epithelial Cells UA 0 TO 4 0 - 5 /HPF    Renal Epithelial, UA NOT REPORTED 0 /HPF    Bacteria, UA None None    Mucus, UA NOT REPORTED None    Trichomonas, UA NOT REPORTED None    Amorphous, UA NOT REPORTED None    Other Observations UA NOT REPORTED NOT REQ.     Yeast, UA NOT REPORTED None       Not indicated unless otherwise documented above    RADIOLOGY:   I reviewed the radiologist interpretations:    No orders to display       Not indicated unless otherwise documented above    EMERGENCY DEPARTMENT COURSE:     The patient was given the following medications:  Orders Placed This Encounter   Medications    0.9 % sodium chloride bolus    ondansetron (ZOFRAN) 4 MG tablet     Sig: Take 1 tablet by mouth every 8 hours as needed for Nausea     Dispense:  20 tablet     Refill:  0        Vitals:   -------------------------  /67   Pulse 103   Temp 98.2 °F (36.8 °C) (Tympanic)   Resp 16   Ht 5' 3\" (1.6 m)

## 2021-01-15 ENCOUNTER — HOSPITAL ENCOUNTER (OUTPATIENT)
Dept: LAB | Age: 26
Discharge: HOME | End: 2021-01-15
Payer: COMMERCIAL

## 2021-01-15 DIAGNOSIS — E55.9 VITAMIN D DEFICIENCY: ICD-10-CM

## 2021-01-15 DIAGNOSIS — E55.9 VITAMIN D DEFICIENCY: Primary | ICD-10-CM

## 2021-01-15 LAB — VITAMIN D 25-HYDROXY: 19 NG/ML (ref 30–100)

## 2021-01-15 PROCEDURE — 82306 VITAMIN D 25 HYDROXY: CPT

## 2021-01-15 PROCEDURE — 36415 COLL VENOUS BLD VENIPUNCTURE: CPT

## 2021-01-20 ENCOUNTER — HOSPITAL ENCOUNTER (OUTPATIENT)
Dept: LAB | Age: 26
Discharge: HOME | End: 2021-01-20
Payer: COMMERCIAL

## 2021-01-20 ENCOUNTER — VIRTUAL VISIT (OUTPATIENT)
Dept: FAMILY MEDICINE CLINIC | Age: 26
End: 2021-01-20
Payer: COMMERCIAL

## 2021-01-20 DIAGNOSIS — Z88.9 ALLERGY STATUS TO UNSPECIFIED DRUGS, MEDICAMENTS AND BIOLOGICAL SUBSTANCES: Primary | ICD-10-CM

## 2021-01-20 DIAGNOSIS — Z88.9 ALLERGY STATUS TO UNSPECIFIED DRUGS, MEDICAMENTS AND BIOLOGICAL SUBSTANCES: ICD-10-CM

## 2021-01-20 PROCEDURE — 82785 ASSAY OF IGE: CPT

## 2021-01-20 PROCEDURE — 36415 COLL VENOUS BLD VENIPUNCTURE: CPT

## 2021-01-20 PROCEDURE — 86003 ALLG SPEC IGE CRUDE XTRC EA: CPT

## 2021-01-20 PROCEDURE — 99213 OFFICE O/P EST LOW 20 MIN: CPT | Performed by: NURSE PRACTITIONER

## 2021-01-20 NOTE — PROGRESS NOTES
Sensations of something sticking in your throat or a lump in your throat 3  Heartburn, chest pain, indigestion, or stomach acid coming up 5    Total: 22

## 2021-01-20 NOTE — PROGRESS NOTES
2021    TELEHEALTH EVALUATION -- Audio/Visual (During KJFRO-43 public health emergency)    HPI:    Nicky Lance (:  1995) has requested an audio/video evaluation for the following concern(s): referred by Dr. Idania Lara for allergy evaluation. HPI  Patient reports a history of stomach upset and psoriasis flare with ingestion of dairy, eggs, chicken and pork. She had brief difficulty breathing associated with a coconut hair spray. It is not clear if the spray contained coconut. She denies lip, tongue or throat swelling, difficulty swallowing, itching or hives and wheezing associated with ingestion of any food. She is also concerned she is allergic to dust as her house is [place]. The dust makes her nose run and gives her diarrhea. She would like to have allergy testing for the aforementioned foods and for dust.      Review of Systems   All other systems reviewed and are negative. Prior to Visit Medications    Medication Sig Taking? Authorizing Provider   Loratadine-Pseudoephedrine (CLARITIN-D 24 HOUR PO) Take 10 mg by mouth Yes Historical Provider, MD   acetaminophen (TYLENOL) 325 MG tablet Take 650 mg by mouth every 6 hours as needed for Pain Yes Historical Provider, MD   vitamin D (ERGOCALCIFEROL) 1.25 MG (01970 UT) CAPS capsule Take 1 capsule by mouth once a week  Patient not taking: Reported on 2021  Timmy Zamarripa MD       Social History     Tobacco Use    Smoking status: Never Smoker    Smokeless tobacco: Never Used   Substance Use Topics    Alcohol use: Yes     Alcohol/week: 0.0 standard drinks     Comment: socially    Drug use: No        Allergies   Allergen Reactions    Other Other (See Comments)     Purex detergent. Tightness in throat  B[employer] softener.  Itching    Ibuprofen Nausea And Vomiting     Past Medical History:   Diagnosis Date    Acne     history of     Anisocoria     history of     Chickenpox     history of      DVT (deep venous thrombosis) ([facility] [address]  H/O deep venous thrombosis 8/22/2020    In left leg, due to OCPs    Obesity     Psoriasis     Seasonal allergies     Social anxiety disorder     Swollen tonsil     history of     Vitamin D deficiency 12/14/2018     Current Outpatient Medications   Medication Sig Dispense Refill    Loratadine-Pseudoephedrine (CLARITIN-D 24 HOUR PO) Take 10 mg by mouth      acetaminophen (TYLENOL) 325 MG tablet Take 650 mg by mouth every 6 hours as needed for Pain      vitamin D (ERGOCALCIFEROL) 1.25 MG (57531 UT) CAPS capsule Take 1 capsule by mouth once a week (Patient not taking: Reported on 1/20/2021) 12 capsule 1     No current facility-administered medications for this visit. PHYSICAL EXAMINATION:  [ INSTRUCTIONS:  \"[x]\" Indicates a positive item  \"[]\" Indicates a negative item  -- DELETE ALL ITEMS NOT EXAMINED]  Vital Signs: (As obtained by patient/caregiver or practitioner observation)    Blood pressure-  Heart rate-    Respiratory rate-    Temperature-  Pulse oximetry-     Constitutional: [x] Appears well-developed and well-nourished [x] No apparent distress      [] Abnormal-   Mental status  [x] Alert and awake  [x] Oriented to person/place/time [x]Able to follow commands      Eyes:  EOM    []  Normal  [] Abnormal-  Sclera  [x]  Normal  [] Abnormal -         Discharge [x]  None visible  [] Abnormal -    HENT:   [x] Normocephalic, atraumatic.   [] Abnormal   [] Mouth/Throat: Mucous membranes are moist.     External Ears [x] Normal  [] Abnormal-     Neck: [x] No visualized mass     Pulmonary/Chest: [x] Respiratory effort normal.  [x] No visualized signs of difficulty breathing or respiratory distress        [] Abnormal-      Musculoskeletal:   [] Normal gait with no signs of ataxia         [x] Normal range of motion of neck        [] Abnormal-       Neurological:        [x] No Facial Asymmetry (Cranial nerve 7 motor function) (limited exam to video visit)          [x] No gaze palsy        [] Abnormal- Skin:        [x] No significant exanthematous lesions or discoloration noted on facial skin         [] Abnormal-            Psychiatric:       [] Normal Affect [x] No Hallucinations        [] Abnormal-     Other pertinent observable physical exam findings-     ASSESSMENT/PLAN:  1. Allergy status to unspecified drugs, medicaments and biological substances  - Allergen Milk (Cow) IGE; Future  - Allergen Egg White IgE; Future  - MISCELLANEOUS TESTING; Future  - Allergen Chicken IgE; Future  - Pork IgE; Future  - Allergen, Respiratory, Region 5 Panel; Future      Return if symptoms worsen or fail to improve. Cruz Diaz is a 30 y.o. female being evaluated by a Virtual Visit (video visit) encounter to address concerns as mentioned above. A caregiver was present when appropriate. Due to this being a TeleHealth encounter (During GZVYI-98 public health emergency), evaluation of the following organ systems was limited: Vitals/Constitutional/EENT/Resp/CV/GI//MS/Neuro/Skin/Heme-Lymph-Imm. Pursuant to the emergency declaration under the [address] [place] [address], [address] [place] [address] authority and the [email or web address] and [employer] Act, this Virtual Visit was conducted with patient's (and/or legal guardian's) consent, to reduce the patient's risk of exposure to COVID-19 and provide necessary medical care. The patient (and/or legal guardian) has also been advised to contact this office for worsening conditions or problems, and seek emergency medical treatment and/or call 911 if deemed necessary. Patient identification was verified at the start of the visit: Yes    Total time spent on this encounter: Not billed by time    Services were provided through a video synchronous discussion virtually to substitute for in-person clinic visit. Patient and provider were located at their individual homes.     --JEFFERY Wren - CNP on 1/20/2021 at 10:44 AM An electronic signature was used to authenticate this note.

## 2021-01-20 NOTE — PROGRESS NOTES
2021    Jo-Ann Handley (:  1995) is a 30 y.o. female, here for evaluation of the following medical concerns: referred by Dr. Don Benitez for allergy evaluation. HPI      Review of Systems    Prior to Visit Medications    Medication Sig Taking? Authorizing Provider   Loratadine-Pseudoephedrine (CLARITIN-D 24 HOUR PO) Take 10 mg by mouth Yes Historical Provider, MD   acetaminophen (TYLENOL) 325 MG tablet Take 650 mg by mouth every 6 hours as needed for Pain Yes Historical Provider, MD   vitamin D (ERGOCALCIFEROL) 1.25 MG (64263 UT) CAPS capsule Take 1 capsule by mouth once a week  Patient not taking: Reported on 2021  Elias Goncalves MD        Allergies   Allergen Reactions    Other Other (See Comments)     Purex detergent. Tightness in throat  Bounce Fabric softener.  Itching    Ibuprofen Nausea And Vomiting       Past Medical History:   Diagnosis Date    Acne     history of     Anisocoria     history of     Chickenpox     history of      [facility]) [address] [address]     H/O deep venous thrombosis 2020    In left leg, due to OCPs    Obesity     Psoriasis     Seasonal allergies     Social anxiety disorder     Swollen tonsil     history of     Vitamin D deficiency 2018       Past Surgical History:   Procedure Laterality Date    [facility]    ENDOSCOPIC ULTRASOUND (LOWER)      OVARIAN CYST REMOVAL Right 2016    UPPER GA[facility]TESTINAL ENDOSCOPY  [facility]    WISDOM TOOTH EXTRACTION         Social History     Socioeconomic History    Marital status:      Spouse name: Not on file    Number of children: Not on file    Years of education: Not on file    Highest education level: Not on file   Occupational History     Employer: [employer]   Social Needs    Financial resource strain: Not on file    Food insecurity     Worry: Not on file     Inability: Not on file    Transportation needs     Medical: Not on file Non-medical: Not on file   Tobacco Use    Smoking status: Never Smoker    Smokeless tobacco: Never Used   Substance and Sexual Activity    Alcohol use: Yes     Alcohol/week: 0.0 standard drinks     Comment: socially    Drug use: No    Sexual activity: Yes     Partners: Male     Birth control/protection: Pill   Lifestyle    Physical activity     Days per week: Not on file     Minutes per session: Not on file    Stress: Not on file   Relationships    Social connections     Talks on phone: Not on file     Gets together: Not on file     Attends Mosque service: Not on file     Active member of club or organization: Not on file     Attends meetings of clubs or organizations: Not on file     Relationship status: Not on file    Intimate partner violence     Fear of current or ex partner: Not on file     Emotionally abused: Not on file     Physically abused: Not on file     Forced sexual activity: Not on file   Other Topics Concern    Not on file   Social History Narrative    Not on file        Family History   Problem Relation Age of Onset    Other Mother         acne    Depression Mother     Deep Vein Thrombosis Mother     Other Father         acne    Depression Father     Deep Vein Thrombosis Father     Thyroid Disease Maternal Grandmother     Diabetes Paternal Grandfather     Thyroid Disease Maternal Uncle     Thyroid Disease Other     Glaucoma Neg Hx        There were no vitals filed for this visit. Estimated body mass index is 35.25 kg/m² as calculated from the following:    Height as of 1/13/21: 5' 3\" (1.6 m). Weight as of 1/13/21: 199 lb (90.3 kg). Physical Exam    ASSESSMENT/PLAN:  There are no diagnoses linked to this encounter. No follow-ups on file. An  electronic signature was used to authenticate this note.     --JEFFERY Brush - CNP on 1/20/2021 at 10:15 AM

## 2021-01-21 LAB
ALLERGEN CHICKEN IGE: <0.1 KU/L (ref 0–0.34)
ALLERGEN COCONUT IGE: <0.1 KU/L (ref 0–0.34)
ALLERGEN COW MILK IGE: <0.1 KU/L (ref 0–0.34)
ALLERGEN EGG WHITE IGE: <0.1 KU/L (ref 0–0.34)
ALLERGEN PORK: <0.1 KU/L (ref 0–0.34)

## 2021-01-22 ENCOUNTER — VIRTUAL VISIT (OUTPATIENT)
Dept: FAMILY MEDICINE CLINIC | Age: 26
End: 2021-01-22
Payer: COMMERCIAL

## 2021-01-22 DIAGNOSIS — F41.9 ANXIETY: Primary | ICD-10-CM

## 2021-01-22 DIAGNOSIS — R19.7 DIARRHEA, UNSPECIFIED TYPE: ICD-10-CM

## 2021-01-22 DIAGNOSIS — R00.2 PALPITATIONS: ICD-10-CM

## 2021-01-22 PROCEDURE — 99213 OFFICE O/P EST LOW 20 MIN: CPT | Performed by: FAMILY MEDICINE

## 2021-01-22 ASSESSMENT — PATIENT HEALTH QUESTIONNAIRE - PHQ9
SUM OF ALL RESPONSES TO PHQ QUESTIONS 1-9: 0
2. FEELING DOWN, DEPRESSED OR HOPELESS: 0
SUM OF ALL RESPONSES TO PHQ9 QUESTIONS 1 & 2: 0
SUM OF ALL RESPONSES TO PHQ QUESTIONS 1-9: 0

## 2021-01-22 ASSESSMENT — ENCOUNTER SYMPTOMS
DIARRHEA: 1
ALLERGIC/IMMUNOLOGIC NEGATIVE: 1
EYES NEGATIVE: 1
RESPIRATORY NEGATIVE: 1

## 2021-01-22 NOTE — PROGRESS NOTES
Subjective:      Patient ID: Rhonda Roberts is a 30 y.o. female. HPI Acute visit for recurrent diarrhea. Concerns for IBS diarrhea. This has improved at present. She feels a sense of heart racing at work. Sense of pulse racing with finger to carotid. She was seen in the er and diagnosed with panic attack. She has no way to check her bp and pulse. She reports episodes starting around the 25th. This was around the start of her quarantine for covid. She doesn't feel she had covid. She has had 3 negative tests, with  having covid and both being in quarantine. Some nausea and dizziness. electrolytes and renal function normal in the ER 1/13/20. She admits to worrying excessively. Legs hurt and she starts to worry about dvt and the heart races. Video teleconference visit scheduled today during covid 19 restrictions. Patient consented to use personal cell phone to connect the visit at home, alone. I am in the office using [employer] software to connect.      Past Medical History:   Diagnosis Date    Acne     history of     Anisocoria     history of     Chickenpox     history of      [facility]) [facility] [address]     H/O deep venous thrombosis 8/22/2020    In left leg, due to OCPs    Obesity     Psoriasis     Seasonal allergies     Social anxiety disorder     Swollen tonsil     history of     Vitamin D deficiency 12/14/2018     Past Surgical History:   Procedure Laterality Date    [facility]    ENDOSCOPIC ULTRASOUND (LOWER)      OVARIAN CYST REMOVAL Right 09/27/2016    UPPER [facility]TESTINAL END[facility]COPY  2010    [facility]    WISDOM TOOTH EXTRACTION       Current Outpatient Medications   Medication Sig Dispense Refill    Loratadine-Pseudoephedrine (CLARITIN-D 24 HOUR PO) Take 10 mg by mouth      acetaminophen (TYLENOL) 325 MG tablet Take 650 mg by mouth every 6 hours as needed for Pain      vitamin D (ERGOCALCIFEROL) 1.25 MG (76605 UT) CAPS capsule Take 1 capsule by mouth once a week (Patient not taking: Reported on 1/22/2021) 12 capsule 1     No current facility-administered medications for this visit. Review of Systems   Constitutional: Negative. HENT: Negative. Eyes: Negative. Respiratory: Negative. Cardiovascular: Positive for palpitations. Gastrointestinal: Positive for diarrhea. Endocrine: Negative. Genitourinary: Negative. Musculoskeletal: Negative. Skin: Negative. Allergic/Immunologic: Negative. Neurological: Negative. Hematological: Negative. Psychiatric/Behavioral: The patient is nervous/anxious. Objective:   Physical Exam  Constitutional:       General: She is not in acute distress. Appearance: She is not toxic-appearing. HENT:      Head: Normocephalic. Nose: Nose normal.   Pulmonary:      Effort: Pulmonary effort is normal. No respiratory distress. Breath sounds: No wheezing. Neurological:      Mental Status: Mental status is at baseline. Psychiatric:         Mood and Affect: Mood normal.         Behavior: Behavior normal.         Thought Content: Thought content normal.         Judgment: Judgment normal.       There were no vitals taken for this visit. Patient-Reported Vitals 1/22/2021   Patient-Reported Weight 199lbs   Patient-Reported Height 5ft 3in   Patient-Reported Temperature 97.6        Assessment:       Encounter Diagnoses   Name Primary?  Anxiety Yes    Palpitations     Diarrhea, unspecified type              Plan:      Diarrhea. ibs -d . Recent flare, resolved at present. Renal function and electrolytes normal at ER visit . Palpitations. She describes recurrent sense of heart racing and dizziness. She has not been able to give a number. No syncope. Seems to be anxiety related, she has had episodes while thinking about covid concerns. She plans to get a watch that can read her pulse, or a bp cuff/pulse oximeter.   She will call with excessive rates of tachycardia to consider svt or other arrhythmia. Anxiety. On citalopram in the past.  Some concerns for side effects , not common side effects. Rec. Restart of the paxil on a trial basis. She is willing to consider counseling. Both parents described as having anxiety issues.                Tenzin Choi MD

## 2021-01-25 LAB
2000687N OAK TREE IGE: <0.1 KU/L (ref 0–0.34)
ALLERGEN BERMUDA GRASS IGE: <0.1 KU/L (ref 0–0.34)
ALLERGEN BIRCH IGE: <0.1 KU/L (ref 0–0.34)
ALLERGEN DOG DANDER IGE: <0.1 KU/L (ref 0–0.34)
ALLERGEN GERMAN COCKROACH IGE: <0.1 KU/L (ref 0–0.34)
ALLERGEN HORMODENDRUM IGE: <0.1 KUL/L (ref 0–0.34)
ALLERGEN MOUSE EPITHELIA IGE: <0.1 KU/L (ref 0–0.34)
ALLERGEN PECAN TREE IGE: <0.1 KU/L (ref 0–0.34)
ALLERGEN PIGWEED ROUGH IGE: <0.1 KU/L (ref 0–0.34)
ALLERGEN SHEEP SORREL (W18) IGE: <0.1 KU/L (ref 0–0.34)
ALLERGEN TREE SYCAMORE: <0.1 KU/L (ref 0–0.34)
ALLERGEN WALNUT TREE IGE: <0.1 KU/L (ref 0–0.34)
ALLERGEN WHITE MULBERRY TREE, IGE: <0.1 KU/L (ref 0–0.34)
ALLERGEN, TREE, WHITE ASH IGE: <0.1 KU/L (ref 0–0.34)
ALTERNARIA ALTERNATA: <0.1 KU/L (ref 0–0.34)
ASPERGILLUS FUMIGATUS: <0.1 KU/L (ref 0–0.34)
CAT DANDER ANTIBODY: <0.1 KU/L (ref 0–0.34)
COTTONWOOD TREE: <0.1 KU/L (ref 0–0.34)
D. FARINAE: <0.1 KU/L (ref 0–0.34)
D. PTERONYSSINUS: <0.1 KU/L (ref 0–0.34)
ELM TREE: <0.1 KU/L (ref 0–0.34)
IGE: 99 IU/ML
MAPLE/BOXELDER TREE: <0.1 KU/L (ref 0–0.34)
MOUNTAIN CEDAR TREE: <0.1 KU/L (ref 0–0.34)
MUCOR RACEMOSUS: <0.1 KU/L (ref 0–0.34)
P. NOTATUM: <0.1 KU/L (ref 0–0.34)
RUSSIAN THISTLE: <0.1 KU/L (ref 0–0.34)
SHORT RAGWD(A ARTEMIS.) IGE: <0.1 KU/L (ref 0–0.34)
TIMOTHY GRASS: <0.1 KU/L (ref 0–0.34)

## 2021-01-28 ENCOUNTER — OFFICE VISIT (OUTPATIENT)
Dept: PRIMARY CARE CLINIC | Age: 26
End: 2021-01-28
Payer: COMMERCIAL

## 2021-01-28 ENCOUNTER — HOSPITAL ENCOUNTER (OUTPATIENT)
Age: 26
Setting detail: SPECIMEN
Discharge: HOME | End: 2021-01-28
Payer: COMMERCIAL

## 2021-01-28 VITALS
SYSTOLIC BLOOD PRESSURE: 122 MMHG | TEMPERATURE: 98.1 F | BODY MASS INDEX: 35.26 KG/M2 | OXYGEN SATURATION: 98 % | HEIGHT: 63 IN | HEART RATE: 86 BPM | WEIGHT: 199 LBS | DIASTOLIC BLOOD PRESSURE: 78 MMHG

## 2021-01-28 DIAGNOSIS — R30.0 DYSURIA: ICD-10-CM

## 2021-01-28 DIAGNOSIS — R30.0 DYSURIA: Primary | ICD-10-CM

## 2021-01-28 LAB
-: ABNORMAL
AMORPHOUS: ABNORMAL
BACTERIA: ABNORMAL
BILIRUBIN URINE: NEGATIVE
CASTS UA: ABNORMAL /LPF (ref 0–2)
COLOR: NORMAL
COMMENT UA: NORMAL
CRYSTALS, UA: ABNORMAL /HPF
EPITHELIAL CELLS UA: ABNORMAL /HPF (ref 0–5)
GLUCOSE URINE: NEGATIVE
KETONES, URINE: NEGATIVE
LEUKOCYTE ESTERASE, URINE: NEGATIVE
MUCUS: ABNORMAL
NITRITE, URINE: NEGATIVE
OTHER OBSERVATIONS UA: ABNORMAL
PH UA: 6 (ref 5–6)
PROTEIN UA: NEGATIVE
RBC UA: ABNORMAL /HPF (ref 0–4)
RENAL EPITHELIAL, UA: ABNORMAL /HPF
SPECIFIC GRAVITY UA: 1.02 (ref 1.01–1.02)
TRICHOMONAS: ABNORMAL
TURBIDITY: NORMAL
URINE HGB: NEGATIVE
UROBILINOGEN, URINE: NORMAL
WBC UA: ABNORMAL /HPF (ref 0–4)
YEAST: ABNORMAL

## 2021-01-28 PROCEDURE — 99213 OFFICE O/P EST LOW 20 MIN: CPT | Performed by: FAMILY MEDICINE

## 2021-01-28 PROCEDURE — 81001 URINALYSIS AUTO W/SCOPE: CPT

## 2021-01-28 RX ORDER — PAROXETINE HYDROCHLORIDE 20 MG/1
TABLET, FILM COATED ORAL
COMMUNITY
Start: 2021-01-26 | End: 2021-03-01 | Stop reason: ALTCHOICE

## 2021-01-28 ASSESSMENT — ENCOUNTER SYMPTOMS
BACK PAIN: 1
SHORTNESS OF BREATH: 0
COUGH: 0
VOMITING: 0
NAUSEA: 0

## 2021-01-28 NOTE — LETTER
[facility] A department of [facility]  Phone: [phone]  Fax: [phone]    Victor Manuel Tellez MD        January 28, 2021     Patient: Deanna Libman   YOB: 1995   Date of Visit: 1/28/2021       To Whom It May Concern: It is my medical opinion that Geeta Mart may return to work on 1/29/2021. She missed work on 1/28 due to urinary issues. If you have any questions or concerns, please don't hesitate to call.     Sincerely,        Victor Manuel Tellez MD

## 2021-01-28 NOTE — PROGRESS NOTES
[address] [place] [address]  Dept: [phone]  Dept Fax: [phone]  Loc: [phone]    Nicky Lance is a 30 y.o. female who presents today for her medical conditions/complaints as noted below. Nicky Lance is c/o of   Chief Complaint   Patient presents with    Dysuria       HPI:     Here for dysuria. Dysuria   This is a new problem. The current episode started in the past 7 days. The problem occurs every urination. The problem has been gradually worsening. The quality of the pain is described as aching (pain goes up to bladder). The pain is at a severity of 3/10. The pain is mild. There has been no fever. She is sexually active (last intercourse with  was a month ago, used toy last Friday with condom and unwashed blanket). There is no history of pyelonephritis. Associated symptoms include chills and flank pain. Pertinent negatives include no frequency, hematuria, nausea, possible pregnancy, urgency or vomiting. Associated symptoms comments: Rapid heartbeat with stomach pain, flank pain on right side, urinating less than usual. Treatments tried: cranberry juice cocktail, she stopped drinking pop in the last week. She also has decreased her sugar intake. The treatment provided no relief. Her past medical history is significant for a urological procedure. There is no history of kidney stones or recurrent UTIs.  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    Past Medical History:   Diagnosis Date    Acne     history of     Anisocoria     history of     Chickenpox     history of      [facility]VT [facility]) [facility] [address]     H/O deep venous thrombosis 8/22/2020    In left leg, due to OCPs    Obesity     Psoriasis     Seasonal allergies     Social anxiety disorder     Swollen tonsil     history of     Vitamin D deficiency 12/14/2018          Social History Tobacco Use    Smoking status: Never Smoker    Smokeless tobacco: Never Used   Substance Use Topics    Alcohol use: Yes     Alcohol/week: 0.0 standard drinks     Comment: socially     Current Outpatient Medications   Medication Sig Dispense Refill    Loratadine-Pseudoephedrine (CLARITIN-D 24 HOUR PO) Take 10 mg by mouth      acetaminophen (TYLENOL) 325 MG tablet Take 650 mg by mouth every 6 hours as needed for Pain      PARoxetine (PAXIL) 20 MG tablet        No current facility-administered medications for this visit. Allergies   Allergen Reactions    Other Other (See Comments)     Purex detergent. Tightness in throat  Bounce Fabric softener. Itching    Ibuprofen Nausea And Vomiting       Subjective:     Review of Systems   Constitutional: Positive for chills. Negative for appetite change, fatigue and fever. Respiratory: Negative for cough and shortness of breath. Gastrointestinal: Negative for nausea and vomiting. Genitourinary: Positive for dysuria and flank pain. Negative for difficulty urinating, frequency, hematuria, urgency and vaginal discharge. Musculoskeletal: Positive for back pain. Objective:      Physical Exam  Vitals signs and nursing note reviewed. Constitutional:       General: She is not in acute distress. Appearance: She is well-developed. Eyes:      Conjunctiva/sclera: Conjunctivae normal.   Neck:      Musculoskeletal: Normal range of motion and neck supple. Thyroid: No thyromegaly. Cardiovascular:      Rate and Rhythm: Normal rate and regular rhythm. Heart sounds: Normal heart sounds. No murmur. Pulmonary:      Effort: Pulmonary effort is normal. No respiratory distress. Breath sounds: Normal breath sounds. No wheezing. Lymphadenopathy:      Cervical: No cervical adenopathy. Skin:     General: Skin is warm and dry. Findings: No erythema or rash.    Neurological: Mental Status: She is alert and oriented to person, place, and time. Psychiatric:         Mood and Affect: Mood normal.         Behavior: Behavior normal.       /78   Pulse 86   Temp 98.1 °F (36.7 °C)   Ht 5' 3\" (1.6 m)   Wt 199 lb (90.3 kg)   SpO2 98%   BMI 35.25 kg/m²     Assessment:       Diagnosis Orders   1. Dysuria  Urinalysis Reflex to Culture      Hospital Outpatient Visit on 01/28/2021   Component Date Value Ref Range Status    Color, UA 01/28/2021 NOT REPORTED  YELLOW Final    Turbidity UA 01/28/2021 NOT REPORTED  CLEAR Final    Glucose, Ur 01/28/2021 NEGATIVE  NEGATIVE Final    Bilirubin Urine 01/28/2021 NEGATIVE  NEGATIVE Final    Ketones, Urine 01/28/2021 NEGATIVE  NEGATIVE Final    Specific Gravity, UA 01/28/2021 1.025  1.010 - 1.025 Final    Urine Hgb 01/28/2021 NEGATIVE  NEGATIVE Final    pH, UA 01/28/2021 6.0  5.0 - 6.0 Final    Protein, UA 01/28/2021 NEGATIVE  NEGATIVE Final    Urobilinogen, Urine 01/28/2021 Normal  Normal Final    Nitrite, Urine 01/28/2021 NEGATIVE  NEGATIVE Final    Leukocyte Esterase, Urine 01/28/2021 NEGATIVE  NEGATIVE Final    Urinalysis Comments 01/28/2021 NOT REPORTED   Final    - 01/28/2021        Final    WBC, UA 01/28/2021 None  0 - 4 /HPF Final    RBC, UA 01/28/2021 None  0 - 4 /HPF Final    Casts UA 01/28/2021 NOT REPORTED  0 - 2 /LPF Final    Crystals, UA 01/28/2021 NOT REPORTED  None /HPF Final    Epithelial Cells UA 01/28/2021 5 TO 10  0 - 5 /HPF Final    Renal Epithelial, UA 01/28/2021 NOT REPORTED  0 /HPF Final    Bacteria, UA 01/28/2021 1+* None Final    Mucus, UA 01/28/2021 1+* None Final    Trichomonas, UA 01/28/2021 NOT REPORTED  None Final    Amorphous, UA 01/28/2021 NOT REPORTED  None Final    Other Observations UA 01/28/2021 NOT REPORTED  NOT REQ.  Final    Yeast, UA 01/28/2021 NOT REPORTED  None Final            Plan: Dysuria: new; she does not have a UTI but her pain could be caused by concentrated urine causing burning. I recommended avoiding bladder irritants like caffeine and drinking lots of water. I also recommended pyridium to help with the pain but she refused. Return if symptoms worsen or fail to improve. Orders Placed This Encounter   Procedures    Urinalysis Reflex to Culture     Standing Status:   Future     Number of Occurrences:   1     Standing Expiration Date:   1/28/2022     Order Specific Question:   SPECIFY(EX-CATH,MIDSTREAM,CYSTO,ETC)? Answer:   clean catch       Patientgiven educational materials - see patient instructions. Discussed use, benefit,and side effects of prescribed medications. All patient questions answered. Ptvoiced understanding. Reviewed health maintenance. Instructed to continue currentmedications, diet and exercise. Patient agreed with treatment plan. Follow up asdirected.      Electronically signed by Michelle Almanza MD on 1/28/2021 at 9:07 AM

## 2021-02-11 ENCOUNTER — OFFICE VISIT (OUTPATIENT)
Dept: BEHAVIORAL/MENTAL HEALTH | Age: 26
End: 2021-02-11
Payer: COMMERCIAL

## 2021-02-11 DIAGNOSIS — F41.9 ANXIETY DISORDER, UNSPECIFIED TYPE: Primary | ICD-10-CM

## 2021-02-11 DIAGNOSIS — R41.844 EXECUTIVE FUNCTION DEFICIT: ICD-10-CM

## 2021-02-11 PROCEDURE — 90791 PSYCH DIAGNOSTIC EVALUATION: CPT | Performed by: COUNSELOR

## 2021-02-11 NOTE — PROGRESS NOTES
Patient presented alone for appointment and engaged readily. Patient reviewed referral information provided by PCP and confirmed contents. Patient indicated that she has a family history of mood and anxiety disorders, and that she has many memories of volatility in her childhood household. Patient stated that she has been experiencing increased physical symptoms of anxiety since her  became ill with COVID-19 recently. Patient indicated that she had tested negative at that time, but experienced an increase in gastrointestinal symptoms simultaneously. Patient indicated her intent to seek an antibody test in the future. Patient reported that anxiety most frequently takes the form of restlessness, but that rapid morbid worry is also prevalent. Patient indicated that she has experienced increasing symptoms of panic since her 's illness. Worries in particular are somatically focused for the most part, including patient's concern she may have a blood clot or related serious concerns. Patient stated that she and her  live with her in-laws, and that the situation is less than ideal due to restricted space and her in-laws mental health issues. Patient discussed several incidents from her childhood which she believes continue to impact her today. Patient additionally discussed her supportive relationship with her  and her concern that she may be codependent with him. Patient discussed her college enrollment and associated progress. Patient reported that her  is historically diagnosed with ADHD and has been telling her that he believes she has it as well. Patient expressed interest in being evaluated for this.          O:  MSE:    Appearance    Patient presents as alert, oriented, and cooperative  Appetite normal  Sleep disturbance Yes  Loss of pleasure Yes  Speech    spontaneous, rapid and interrupting  Mood    Anxious  Affect    normal affect Thought Process    Circumstantial with occasional full tangentiality  Insight    Good  Judgment    Noted impulsivity, capable of appropriate judgment  Memory    recent and remote memory intact  Suicide Assessment    no suicidal ideation      History:    Medications:   Current Outpatient Medications   Medication Sig Dispense Refill    PARoxetine (PAXIL) 20 MG tablet       Loratadine-Pseudoephedrine (CLARITIN-D 24 HOUR PO) Take 10 mg by mouth      acetaminophen (TYLENOL) 325 MG tablet Take 650 mg by mouth every 6 hours as needed for Pain       No current facility-administered medications for this visit. Social History:   Social History     Socioeconomic History    Marital status:      Spouse name: Not on file    Number of children: Not on file    Years of education: Not on file    Highest education level: Not on file   Occupational History     Employer: [employer]   Social Needs    Financial resource strain: Not on file    Food insecurity     Worry: Not on file     Inability: Not on file   [employer] needs     Medical: Not on file     Non-medical: Not on file   Tobacco Use    Smoking status: Never Smoker    Smokeless tobacco: Never Used   Substance and Sexual Activity    Alcohol use:  Yes     Alcohol/week: 0.0 standard drinks     Comment: socially    Drug use: No    Sexual activity: Yes     Partners: Male     Birth control/protection: Pill   Lifestyle    Physical activity     Days per week: Not on file     Minutes per session: Not on file    Stress: Not on file   Relationships    Social connections     Talks on phone: Not on file     Gets together: Not on file     Attends Jehovah's witness service: Not on file     Active member of club or organization: Not on file     Attends meetings of clubs or organizations: Not on file     Relationship status: Not on file    Intimate partner violence     Fear of current or ex partner: Not on file     Emotionally abused: Not on file Physically abused: Not on file     Forced sexual activity: Not on file   Other Topics Concern    Not on file   Social History Narrative    Not on file       TOBACCO:   reports that she has never smoked. She has never used smokeless tobacco.  ETOH:   reports current alcohol use. Family History:   Family History   Problem Relation Age of Onset    Other Mother         acne    Depression Mother     Deep Vein Thrombosis Mother     Other Father         acne    Depression Father     Deep Vein Thrombosis Father     Thyroid Disease Maternal Grandmother     Diabetes Paternal Grandfather     Thyroid Disease Maternal Uncle     Thyroid Disease Other     Glaucoma Neg Hx            A:  Patient presents with multiple indicators of active anxiety, but additionally displays and reports symptoms of executive dysfunction that are not likely to be caused by same. Patient is experiencing several situational stressors which are likely to increase her experience of anxiety if adequate use of coping strategies is not maintained. Additionally, patient's reported physical symptoms of panic and their onset suggests that the eventual panic attack may be evolving out of a reaction to an unrelated body state, possibly associated with her recent suspected COVID-19 infection. Patient has been provided a workbook chapter on managing symptoms of panic attacks to help her better cope with these occasions. Patient is additionally requesting evaluation of potential ADHD upon encouragement from her family. Patient has been scheduled to return in 3 weeks for follow-up. Diagnosis:    1. Anxiety disorder, unspecified type    2.  Executive function deficit            Diagnosis Date    Acne     history of     Anisocoria     history of     Chickenpox     history of      [facility]) [facility] [address]     H/O deep venous thrombosis 8/22/2020    In left leg, due to OCPs    Obesity     Psoriasis     Seasonal allergies  Social anxiety disorder     Swollen tonsil     history of     Vitamin D deficiency 12/14/2018         Plan:  Pt interventions:  Discussed various factors related to the development and maintenance of  anxiety and stress (including biological, cognitive, behavioral, and environmental factors), Trained in strategies for increasing balanced thinking, Trained in relaxation strategies, Discussed self-care (sleep, nutrition, rewarding activities, social support, exercise), Discussed and problem-solved barriers in adhering to behavioral change plan, Motivational Interviewing to increase patient confidence and compliance with adhering to behavioral change plan, Motivational Interviewing to determine importance and readiness for change, Discussed potential barriers to change, Established rapport, Conducted functional assessment, [place]-setting to identify pt's primary goals for [facility] visit / overall health, Supportive techniques, Provided Psychoeducation re: mind-body connection, CBT to target cognitive distortions and Identified maladaptive thoughts      Pt Behavioral Change Plan:  1) Take a look at the workbook chapter you were provided today. Patient to return in 3 week(s) for follow-up. All questions about treatment plan answered. Patient instructed to call the crisis line and/or proceed to emergency room if suicidal or homicidal ideations occur outside of clinic hours and crisis management skills do not provide relief. Patient stated understanding and is agreeable to treatment and crisis plan.     (Please note that portions of this note were completed with a voice recognition program. Efforts were made to edit the dictations but occasionally words are mis-transcribed.)    Provider Signature:  Electronically signed by Gracy Rice PSYD on 2/11/2021 at 9:13 AM

## 2021-02-13 ENCOUNTER — OFFICE VISIT (OUTPATIENT)
Dept: PRIMARY CARE CLINIC | Age: 26
End: 2021-02-13
Payer: COMMERCIAL

## 2021-02-13 VITALS
RESPIRATION RATE: 16 BRPM | HEIGHT: 63 IN | WEIGHT: 197 LBS | TEMPERATURE: 97.7 F | SYSTOLIC BLOOD PRESSURE: 128 MMHG | OXYGEN SATURATION: 98 % | DIASTOLIC BLOOD PRESSURE: 76 MMHG | BODY MASS INDEX: 34.91 KG/M2 | HEART RATE: 63 BPM

## 2021-02-13 DIAGNOSIS — R25.2 MUSCLE CRAMPS: ICD-10-CM

## 2021-02-13 DIAGNOSIS — R19.7 DIARRHEA, UNSPECIFIED TYPE: ICD-10-CM

## 2021-02-13 DIAGNOSIS — M25.561 ACUTE PAIN OF RIGHT KNEE: Primary | ICD-10-CM

## 2021-02-13 PROCEDURE — 99213 OFFICE O/P EST LOW 20 MIN: CPT | Performed by: FAMILY MEDICINE

## 2021-02-13 ASSESSMENT — ENCOUNTER SYMPTOMS
GASTROINTESTINAL NEGATIVE: 1
EYES NEGATIVE: 1
ALLERGIC/IMMUNOLOGIC NEGATIVE: 1
RESPIRATORY NEGATIVE: 1

## 2021-02-13 NOTE — PROGRESS NOTES
Subjective:      Patient ID: Nilda Eric is a 30 y.o. female. HPI Acute urgent care visit for discomfort in right lower extremity. Currently having recurrent migraines. Having some diarrhea. Some cramps in the legs in the last 2-3 days. She has a history of DVT in the past, on ocp's at the time. Past Medical History:   Diagnosis Date    Acne     history of     Anisocoria     history of     Chickenpox     history of      DVT (deep venous thrombosis) ([address]     H/O deep venous thrombosis 8/22/2020    In left leg, due to OCPs    Obesity     Psoriasis     Seasonal allergies     Social anxiety disorder     Swollen tonsil     history of     Vitamin D deficiency 12/14/2018     Past Surgical History:   Procedure Laterality Date    [facility]  2010    [facility]    ENDOSCOPIC ULTRASOUND (LOWER)      OVARIAN CYST REMOVAL Right 09/27/2016    UPPER GASTROINTESTINAL ENDOSCOPY  2010    [facility]    WISDOM TOOTH EXTRACTION       Current Outpatient Medications   Medication Sig Dispense Refill    Loratadine-Pseudoephedrine (CLARITIN-D 24 HOUR PO) Take 10 mg by mouth      acetaminophen (TYLENOL) 325 MG tablet Take 650 mg by mouth every 6 hours as needed for Pain      PARoxetine (PAXIL) 20 MG tablet        No current facility-administered medications for this visit. Allergies   Allergen Reactions    Other Other (See Comments)     Purex detergent. Tightness in throat  Bounce [employer] softener. Itching    Ibuprofen Nausea And Vomiting         Review of Systems   Constitutional: Negative. Negative for fever. HENT: Negative. Eyes: Negative. Respiratory: Negative. Cardiovascular: Negative. Gastrointestinal: Negative. Endocrine: Negative. Genitourinary: Negative. Musculoskeletal: Positive for arthralgias (right knee popliteal) and myalgias (cramps in the legs bilateral. calf/thighs.). Skin: Negative. Allergic/Immunologic: Negative.     Neurological: Positive for headaches. Hematological: Negative. Psychiatric/Behavioral: Negative. Objective:   Physical Exam  Constitutional:       General: She is not in acute distress. Appearance: Normal appearance. She is obese. She is not toxic-appearing. HENT:      Head: Normocephalic. Nose: Nose normal.   Eyes:      Pupils: Pupils are equal, round, and reactive to light. Neck:      Musculoskeletal: Normal range of motion. Cardiovascular:      Rate and Rhythm: Normal rate. Pulses: Normal pulses. Pulmonary:      Effort: Pulmonary effort is normal.   Musculoskeletal: Normal range of motion. General: No swelling. Right knee: She exhibits normal range of motion, no swelling, no effusion, no ecchymosis and no erythema. Tenderness (popliteal, medial joint line. ) found. Medial joint line tenderness noted. Legs:    Neurological:      General: No focal deficit present. Mental Status: She is alert. Psychiatric:         Mood and Affect: Mood normal.         Behavior: Behavior normal.         Thought Content: Thought content normal.       /76 (Site: Right Upper Arm, Position: Sitting, Cuff Size: Medium Adult)   Pulse 63   Temp 97.7 °F (36.5 °C) (Tympanic)   Resp 16   Ht 5' 3\" (1.6 m)   Wt 197 lb (89.4 kg)   LMP 02/04/2021 (Approximate)   SpO2 98%   BMI 34.90 kg/m²     Assessment:       Encounter Diagnoses   Name Primary?  Acute pain of right knee Yes    Diarrhea, unspecified type     Muscle cramps              Plan:      Some popliteal area knee pain. Doubt dvt at present. She hyperextends the knee some, overly flexible. Observing for progressing symptoms, edema. Return prn concerns. Diarrhea. Intermittent issue, likely ibs -d. Discussed food diary and more fiber. Muscle cramps in lower legs, possible electrolyte issue with recent diarrhea. Eating bananas. Consider dill pickles to offset cramps.               [place] MD

## 2021-02-18 ENCOUNTER — OFFICE VISIT (OUTPATIENT)
Dept: OPTOMETRY | Age: 26
End: 2021-02-18
Payer: COMMERCIAL

## 2021-02-18 DIAGNOSIS — H52.13 MYOPIA OF BOTH EYES WITH ASTIGMATISM: ICD-10-CM

## 2021-02-18 DIAGNOSIS — H52.203 MYOPIA OF BOTH EYES WITH ASTIGMATISM: ICD-10-CM

## 2021-02-18 DIAGNOSIS — H57.02 ANISOCORIA: ICD-10-CM

## 2021-02-18 DIAGNOSIS — H53.8 BLURRED VISION, BILATERAL: Primary | ICD-10-CM

## 2021-02-18 PROCEDURE — 99213 OFFICE O/P EST LOW 20 MIN: CPT | Performed by: OPTOMETRIST

## 2021-02-18 ASSESSMENT — REFRACTION_WEARINGRX
OD_SPHERE: -2.75
OS_AXIS: 070
OS_SPHERE: -3.75
OD_CYLINDER: -0.25
OS_CYLINDER: -0.50
OD_AXIS: 091
SPECS_TYPE: SVL

## 2021-02-18 ASSESSMENT — REFRACTION_CURRENTRX
OS_CYLINDER: DS
OD_CYLINDER: DS
OD_SPHERE: -3.00
OD_BRAND: AIR OPTIX HYDRAGLYDE
OS_BASECURVE: 8.6
OS_SPHERE: -4.00
OD_BASECURVE: 8.6
OS_BRAND: AIR OPTIX HYDRAGLYDE

## 2021-02-18 ASSESSMENT — REFRACTION_MANIFEST
OS_CYLINDER: -0.50
OD_SPHERE: -3.00
OS_AXIS: 070
OS_SPHERE: -3.75
OD_CYLINDER: -DS

## 2021-02-18 ASSESSMENT — KERATOMETRY
OS_AXISANGLE2_DEGREES: 000
OS_AXISANGLE_DEGREES: 090
OD_K2POWER_DIOPTERS: 45.00
METHOD_AUTO_MANUAL: AUTOMATED
OD_K1POWER_DIOPTERS: 44.50
OS_K1POWER_DIOPTERS: 44.25
OD_AXISANGLE2_DEGREES: 011
OS_K2POWER_DIOPTERS: 44.25
OD_AXISANGLE_DEGREES: 101

## 2021-02-18 ASSESSMENT — VISUAL ACUITY
OS_CC: 20/20
CORRECTION_TYPE: GLASSES
OD_CC+: -1
METHOD: SNELLEN - LINEAR

## 2021-02-18 ASSESSMENT — ENCOUNTER SYMPTOMS
ALLERGIC/IMMUNOLOGIC NEGATIVE: 0
GASTROINTESTINAL NEGATIVE: 0
EYES NEGATIVE: 0
RESPIRATORY NEGATIVE: 0

## 2021-02-18 ASSESSMENT — TONOMETRY
IOP_METHOD: NON-CONTACT AIR PUFF
OD_IOP_MMHG: 17
OS_IOP_MMHG: 19

## 2021-02-18 ASSESSMENT — SLIT LAMP EXAM - LIDS
COMMENTS: NORMAL
COMMENTS: NORMAL

## 2021-02-18 NOTE — PROGRESS NOTES
Tito Lowry presents today for   Chief Complaint   Patient presents with    Blurred Vision    Vision Exam   .    HPI     Blurred Vision     In both eyes. Vision is blurred. Context:  distance vision. Comments     Last Vision Exam: 1/17/2018 AW  Last Ophthalmology Exam: n/a  Last Filled Glasses Rx: 10/19/2018  Insurance: UMR  Update: Contacts ; Glasses if needed  Distance is getting more blurry  Last wore contacts in 2012 possibly  Want refit in contact lenses                  Current Outpatient Medications   Medication Sig Dispense Refill    PARoxetine (PAXIL) 20 MG tablet       Loratadine-Pseudoephedrine (CLARITIN-D 24 HOUR PO) Take 10 mg by mouth      acetaminophen (TYLENOL) 325 MG tablet Take 650 mg by mouth every 6 hours as needed for Pain       No current facility-administered medications for this visit. Family History   Problem Relation Age of Onset    Other Mother         acne    Depression Mother     Deep Vein Thrombosis Mother     Other Father         acne    Depression Father     Deep Vein Thrombosis Father     Thyroid Disease Maternal Grandmother     Diabetes Paternal Grandfather     Thyroid Disease Maternal Uncle     Thyroid Disease Other     Glaucoma Neg Hx     Cataracts Neg Hx      Social History     Socioeconomic History    Marital status:      Spouse name: None    Number of children: None    Years of education: None    Highest education level: None   Occupational History     Employer: [employer]   Social Needs    Financial resource strain: None    Food insecurity     Worry: None     Inability: None    Transportation needs     Medical: None     Non-medical: None   Tobacco Use    Smoking status: Never Smoker    Smokeless tobacco: Never Used   Substance and Sexual Activity    Alcohol use:  Yes     Alcohol/week: 0.0 standard drinks     Comment: socially    Drug use: No    Sexual activity: Yes     Partners: Male Birth control/protection: Pill   Lifestyle    Physical activity     Days per week: None     Minutes per session: None    Stress: None   Relationships    Social connections     Talks on phone: None     Gets together: None     Attends [employer] service: None     Active member of club or organization: None     Attends meetings of clubs or organizations: None     Relationship status: None    Intimate partner violence     Fear of current or ex partner: None     Emotionally abused: None     Physically abused: None     Forced sexual activity: None   Other Topics Concern    None   Social History Narrative    None     Past Medical History:   Diagnosis Date    Acne     history of     Anisocoria     history of     Chickenpox     history of      DVT (deep venous thrombosis) [address] [address]     H/O deep venous thrombosis 8/22/2020    In left leg, due to OCPs    Obesity     Psoriasis     Seasonal allergies     Social anxiety disorder     Swollen tonsil     history of     Vitamin D deficiency 12/14/2018       ROS     Negative for: Constitutional, Gastrointestinal, Neurological, Skin, Genitourinary, Musculoskeletal, HENT, Endocrine, Cardiovascular, Eyes, Respiratory, Psychiatric, Allergic/Imm, Heme/Lymph          Main Ophthalmology Exam     External Exam       Right Left    External Normal Normal          Slit Lamp Exam       Right Left    Lids/Lashes Normal Normal    Conjunctiva/Sclera White and quiet White and quiet    Cornea Clear Clear    Anterior Chamber Deep and quiet Deep and quiet    Iris Round and reactive Round and reactive    Lens Clear Clear    Vitreous Normal Normal          Fundus Exam       Right Left    Disc Normal Normal    C/D Ratio 0.2 0.2    Macula Normal Normal    Vessels Normal Normal    Periphery Normal Normal                   Tonometry     Tonometry (Non-contact air puff, 4:29 PM)       Right Left    Pressure 17 19                           Not recorded         Not recorded Visual Acuity (Snellen - Linear)       Right Left    Dist cc 20/25 -1 20/20    Correction: Glasses          Pupils     Pupils    Right pupil sluggish and larger than the left pupil                Neuro/Psych     Neuro/Psych     Oriented x3: Yes    Mood/Affect: Normal              Keratometry     Keratometry (Automated)       K1 Axis K2 Axis    Right 44.50 011 45.00 101    Left 44.25 000 44.25 090                  Ophthalmology Exam     Wearing Rx       Sphere Cylinder Axis    Right -2.75 -0.25 091    Left -3.75 -0.50 070    Age: 1yrs    Type: SVL              Manifest Refraction     Manifest Refraction       Sphere Cylinder [place] Dist [place]    Right -3.00 -ds  20/20    Left -3.75 -0.50 070 20/20          Manifest Refraction #2 (Auto)       Sphere Cylinder [place] Dist [place]    Right -3.00 0.00 000     Left -3.75 -0.50 070                Final Rx       Sphere Cylinder Axis    Right -3.00 -ds     Left -3.75 -0.50 070    Type: SVL    Expiration Date: 2/19/2023            IMPRESSION:  1. Blurred vision, bilateral    2. Myopia of both eyes with astigmatism    3. Anisocoria        PLAN:    1. New glasses recommended   2. New glasses  3. No rx     Keep the glasses for now   Patient Instructions   New glasses recommended     New contact lens trials given;   Ok to order after using the trials      Return in about 2 years (around 2/18/2023) for complete eye exam.

## 2021-03-01 RX ORDER — VENLAFAXINE HYDROCHLORIDE 75 MG/1
75 CAPSULE, EXTENDED RELEASE ORAL DAILY
Qty: 30 CAPSULE | Refills: 3 | Status: SHIPPED | OUTPATIENT
Start: 2021-03-01 | End: 2021-03-09

## 2021-03-04 ENCOUNTER — TELEMEDICINE (OUTPATIENT)
Dept: BEHAVIORAL/MENTAL HEALTH | Age: 26
End: 2021-03-04
Payer: COMMERCIAL

## 2021-03-04 DIAGNOSIS — F41.9 ANXIETY DISORDER, UNSPECIFIED TYPE: Primary | ICD-10-CM

## 2021-03-04 DIAGNOSIS — R41.844 EXECUTIVE FUNCTION DEFICIT: ICD-10-CM

## 2021-03-04 PROCEDURE — 90837 PSYTX W PT 60 MINUTES: CPT | Performed by: COUNSELOR

## 2021-03-04 NOTE — PROGRESS NOTES
This note will not be viewable in [employer] for the following reason(s). This is a Psychotherapy Note. TELEHEALTH Consultation/Psychotherapy-- Audio/Visual   (During OAYHQ-54 public health emergency)  Hernesto Freire Psy.D. Visit Date:  3/4/2021    Patient:  Cecilia Arguelles  YOB: 1995  Chief Complaint:  Follow-up    Duration of session:  53 minutes    Patient Location:   Patient home [place], [place])    Provider Location:   [place], [place]    This virtual visit was conducted via interactive/real-time audio/video. S:     Patient requested audio/video appointment in lieu of in-person contact to reduce COVID-19 exposure risk. Visit was conducted via secure weblink through [employer]. Patient confirmed identity via name and birthdate, and expressed understanding of potential limits of confidentiality due to format. Patient presented alone for appointment and engaged readily. Patient reported that she had been experiencing some improvement in her symptoms after a trip out of state to visit friends with her . Patient indicated that some aspects of the trip had been stressful, but that the break and change of scenery had been helpful. Patient discussed her review of the workbook sections provided at last contact. Patient additionally discussed her frustration with the discrepancy between her ability to intellectually realize that the situation was manageable and to feel confident under fire, but still experience brief periods of intense anxiety and \"meltdown\". Patient indicated her desire to explore her concerns about potential ADHD further and completed a screening questionnaire to more directly assess possible problems in this area. Patient reviewed her use of self-care and coping strategies.       O:    Appearance    Patient presents as alert, oriented, and cooperative  Appetite normal  Sleep disturbance Yes  Loss of pleasure Yes Speech    spontaneous, rapid and interrupting  Mood    Anxious  Affect    normal affect  Thought Process    Circumstantial with occasional full tangentiality  Insight    Good  Judgment    Noted impulsivity, capable of appropriate judgment  Memory    recent and remote memory intact  Suicide Assessment    no suicidal ideation      A:    1. Anxiety disorder, unspecified type    2. Executive function deficit        Patient continues to report episodic brief anxiety attacks, but persistence or generalized anxiety has been largely absent since last contact. Patient continues to cope with situational stressors involving her education and the ongoing pandemic. Patient completed the ASRS to screen her reported concerns about executive function deficits more closely. Patient rated in the clinical significance range on 16 of 18 symptoms, indicating that more in-depth evaluation would likely be beneficial.  Patient additionally reported today that she has struggled with reading problems since she was a child, and described several problem patterns that suggest possible dyslexia. Patient has begun work on the provided workbook chapter regarding anxiety attacks, and is recommended to continue. Patient is additionally recommended to follow-up with her PCP as directed and to continue practicing consistent use of self-care and coping strategies. Patient has been scheduled to return in 3 weeks for follow-up.       P: Discussed various factors related to the development and maintenance of  anxiety and stress (including biological, cognitive, behavioral, and environmental factors), Trained in strategies for increasing balanced thinking, Trained in relaxation strategies, Discussed self-care (sleep, nutrition, rewarding activities, social support, exercise), Discussed and problem-solved barriers in adhering to behavioral change plan, Motivational Interviewing to increase patient confidence and compliance with adhering to behavioral change plan, Motivational Interviewing to determine importance and readiness for change, Discussed potential barriers to change, Established rapport, Conducted functional assessment, [place]-setting to identify pt's primary goals for [facility] visit / overall health, Supportive techniques, Provided Psychoeducation re: mind-body connection, CBT to target cognitive distortions and Identified maladaptive thoughts    Patient Plan:  1) Take a look at the workbook chapter you were provided and keep working through any elements that you haven't yet finished. 2) Fill out the questionnaires that you were sent via [employer] and return as soon as you're able. Patient to return in 3 week(s) for follow-up. All questions about treatment plan answered. Patient instructed to call the crisis line and/or proceed to emergency room if suicidal or homicidal ideations occur outside of clinic hours and crisis management skills do not provide relief. Patient stated understanding and is agreeable to treatment and crisis plan.     (Please note that portions of this note were completed with a voice recognition program. Efforts were made to edit the dictations but occasionally words are mis-transcribed.)    Provider Signature:  Electronically signed by Whitley Doyle PSYD on 3/4/2021 at 11:16 AM Pursuant to the emergency declaration under the [facility], [facility] waiver authority and the [employer] and [employer] Act, this Virtual Visit was conducted, with patient's consent, to reduce the patient's risk of exposure to COVID-19 and provide continuity of care for an established patient. Services were provided through a video synchronous discussion virtually to substitute for in-person clinic visit.

## 2021-03-04 NOTE — PATIENT INSTRUCTIONS
1) Take a look at the workbook chapter you were provided and keep working through any elements that you haven't yet finished. 2) Fill out the questionnaires that you were sent via [employer] and return as soon as you're able.

## 2021-03-06 ENCOUNTER — OFFICE VISIT (OUTPATIENT)
Dept: PRIMARY CARE CLINIC | Age: 26
End: 2021-03-06
Payer: COMMERCIAL

## 2021-03-06 VITALS
DIASTOLIC BLOOD PRESSURE: 76 MMHG | BODY MASS INDEX: 35.44 KG/M2 | SYSTOLIC BLOOD PRESSURE: 112 MMHG | HEART RATE: 89 BPM | TEMPERATURE: 98.2 F | WEIGHT: 200 LBS | HEIGHT: 63 IN | OXYGEN SATURATION: 97 % | RESPIRATION RATE: 18 BRPM

## 2021-03-06 DIAGNOSIS — J01.40 ACUTE NON-RECURRENT PANSINUSITIS: Primary | ICD-10-CM

## 2021-03-06 DIAGNOSIS — R59.1 LYMPHADENOPATHY: ICD-10-CM

## 2021-03-06 DIAGNOSIS — J06.9 UPPER RESPIRATORY TRACT INFECTION, UNSPECIFIED TYPE: ICD-10-CM

## 2021-03-06 PROCEDURE — 99213 OFFICE O/P EST LOW 20 MIN: CPT | Performed by: NURSE PRACTITIONER

## 2021-03-06 RX ORDER — DOXYCYCLINE HYCLATE 100 MG
100 TABLET ORAL 2 TIMES DAILY
Qty: 20 TABLET | Refills: 0 | Status: SHIPPED | OUTPATIENT
Start: 2021-03-06 | End: 2021-03-16

## 2021-03-06 ASSESSMENT — PATIENT HEALTH QUESTIONNAIRE - PHQ9
2. FEELING DOWN, DEPRESSED OR HOPELESS: 0
1. LITTLE INTEREST OR PLEASURE IN DOING THINGS: 0
SUM OF ALL RESPONSES TO PHQ QUESTIONS 1-9: 0

## 2021-03-06 ASSESSMENT — ENCOUNTER SYMPTOMS
GASTROINTESTINAL NEGATIVE: 1
SINUS PRESSURE: 1
CHEST TIGHTNESS: 0
RHINORRHEA: 1
COUGH: 1
WHEEZING: 0
SHORTNESS OF BREATH: 0
SORE THROAT: 0

## 2021-03-06 NOTE — PATIENT INSTRUCTIONS
Patient Education        Sinusitis: Care Instructions  Your Care Instructions     Sinusitis is an infection of the lining of the sinus cavities in your head. Sinusitis often follows a cold. It causes pain and pressure in your head and face. In most cases, sinusitis gets better on its own in 1 to 2 weeks. But some mild symptoms may last for several weeks. Sometimes antibiotics are needed. Follow-up care is a key part of your treatment and safety. Be sure to make and go to all appointments, and call your doctor if you are having problems. It's also a good idea to know your test results and keep a list of the medicines you take. How can you care for yourself at home? · Take an over-the-counter pain medicine, such as acetaminophen (Tylenol), ibuprofen (Advil, Motrin), or naproxen (Aleve). Read and follow all instructions on the label. · If the doctor prescribed antibiotics, take them as directed. Do not stop taking them just because you feel better. You need to take the full course of antibiotics. · Be careful when taking over-the-counter cold or flu medicines and Tylenol at the same time. Many of these medicines have acetaminophen, which is Tylenol. Read the labels to make sure that you are not taking more than the recommended dose. Too much acetaminophen (Tylenol) can be harmful. · Breathe warm, moist air from a steamy shower, a hot bath, or a sink filled with hot water. Avoid cold, dry air. Using a humidifier in your home may help. Follow the directions for cleaning the machine. · Use saline (saltwater) nasal washes to help keep your nasal passages open and wash out mucus and bacteria. You can buy saline nose drops at a grocery store or drugstore. Or you can make your own at home by adding 1 teaspoon of salt and 1 teaspoon of baking soda to 2 cups of distilled water. If you make your own, fill a bulb syringe with the solution, insert the tip into your nostril, and squeeze gently. Blaine Chain your nose.   · Put a hot, wet towel or a warm gel pack on your face 3 or 4 times a day for 5 to 10 minutes each time. · Try a decongestant nasal spray like oxymetazoline (Afrin). Do not use it for more than 3 days in a row. Using it for more than 3 days can make your congestion worse. When should you call for help? Call your doctor now or seek immediate medical care if:    · You have new or worse swelling or redness in your face or around your eyes.     · You have a new or higher fever. Watch closely for changes in your health, and be sure to contact your doctor if:    · You have new or worse facial pain.     · The mucus from your nose becomes thicker (like pus) or has new blood in it.     · You are not getting better as expected. Where can you learn more? Go to [email or web address] org and sign in to your [employer] account. Enter Z411 in the [employer] box to learn more about \"Sinusitis: Care Instructions. \"     If you do not have an account, please click on the \"Sign Up Now\" link. Current as of: April 15, 2020               Content Version: 12.6  © 1512-1084 [employer]. Care instructions adapted under license by [facility] ([facility]). If you have questions about a medical condition or this instruction, always ask your healthcare professional. [address] any warranty or liability for your use of this information.

## 2021-03-06 NOTE — PROGRESS NOTES
[facility]             [address], [address]                        Telephone [phone]             Fax [phone]     Lexi Oliveira  1995  QPB:[id number]   Date of visit:  3/6/2021    Subjective:    Lexi Oliveira is a 30 y.o.  female who presents to [facility] today (3/6/2021) for evaluation of:    Chief Complaint   Patient presents with    Cough     lymph node on right side of neck sore,green mucus when blowing her nose,was tested for covid on thursday and negative       Cough  This is a new problem. The current episode started 1 to 4 weeks ago (X 2 weeks). The problem has been unchanged. The problem occurs every few hours. The cough is productive of sputum (intermittent white thick mucus; usually in the morning). Associated symptoms include nasal congestion (thick mucus with green), postnasal drip and rhinorrhea. Pertinent negatives include no chest pain, chills, ear pain, fever, headaches, myalgias, rash, sore throat, shortness of breath, sweats or wheezing. Associated symptoms comments: Swollen lymph node right side neck; loose stool intermittent; muscle aches. Nothing aggravates the symptoms. Treatments tried: claritin. The treatment provided mild relief. She is tested for Covid-19 weekly at [employer]. She tested negative on 03/04/21.      She has the following problem list:  Patient Active Problem List   Diagnosis    Psoriasis    Acne    Obesity    Myopia of both eyes with astigmatism    Anisocoria    LPRD (laryngopharyngeal reflux disease)    Social anxiety disorder    Cyst of right ovary    Vitamin D deficiency    H/O deep venous thrombosis    Heterozygous for MTHFR gene mutation        Current medications are:  Current Outpatient Medications   Medication Sig Dispense Refill    doxycycline hyclate (VIBRA-TABS) 100 MG tablet Take 1 tablet by mouth 2 times daily for 10 days 20 tablet 0    Loratadine-Pseudoephedrine (CLARITIN-D 24 HOUR PO) Take 10 mg by mouth      acetaminophen (TYLENOL) 325 MG tablet Take 650 mg by mouth every 6 hours as needed for Pain      venlafaxine (EFFEXOR XR) 75 MG extended release capsule Take 1 capsule by mouth daily 30 capsule 3     No current facility-administered medications for this visit. She is allergic to other and ibuprofen. .    She  reports that she has never smoked. She has never used smokeless tobacco.      Objective:    Vitals:    03/06/21 1249   BP: 112/76   Pulse: 89   Resp: 18   Temp: 98.2 °F (36.8 °C)   TempSrc: Temporal   SpO2: 97%   Weight: 200 lb (90.7 kg)   Height: 5' 3\" (1.6 m)     Body mass index is 35.43 kg/m². Review of Systems   Constitutional: Negative. Negative for appetite change, chills, fatigue and fever. HENT: Positive for congestion, postnasal drip, rhinorrhea and sinus pressure. Negative for ear pain and sore throat. Respiratory: Positive for cough. Negative for chest tightness, shortness of breath and wheezing. Cardiovascular: Negative. Negative for chest pain. Gastrointestinal: Negative. Musculoskeletal: Negative for myalgias. Skin: Negative for rash. Neurological: Negative for headaches. Physical Exam  Vitals signs and nursing note reviewed. Constitutional:       Appearance: She is well-developed. HENT:      Head: Normocephalic. Jaw: There is normal jaw occlusion. Right Ear: Tympanic membrane, ear canal and external ear normal.      Left Ear: Tympanic membrane, ear canal and external ear normal.      Nose: Congestion present. Right Turbinates: Swollen (erythema). Left Turbinates: Swollen (erythema). Right Sinus: Maxillary sinus tenderness and frontal sinus tenderness present. Left Sinus: Maxillary sinus tenderness and frontal sinus tenderness present. Mouth/Throat:      Lips: Pink.       Mouth: Mucous membranes are moist.      Pharynx: Oropharynx is clear. Uvula midline. Posterior oropharyngeal erythema present. Tonsils: 1+ on the right. 1+ on the left. Eyes:      Pupils: Pupils are equal, round, and reactive to light. Neck:      Musculoskeletal: Normal range of motion and neck supple. Cardiovascular:      Rate and Rhythm: Normal rate and regular rhythm. Pulses: Normal pulses. Heart sounds: Normal heart sounds. Pulmonary:      Effort: Pulmonary effort is normal.      Breath sounds: Normal breath sounds and air entry. Lymphadenopathy:      Cervical: Cervical adenopathy present. Right cervical: Superficial cervical adenopathy present. Left cervical: Superficial cervical adenopathy present. Skin:     General: Skin is warm and dry. Neurological:      General: No focal deficit present. Mental Status: She is alert and oriented to person, place, and time. Psychiatric:         Behavior: Behavior normal.         Thought Content: Thought content normal.       Assessment and Plan:    No results found for this visit on 03/06/21. Diagnosis Orders   1. Acute non-recurrent pansinusitis  doxycycline hyclate (VIBRA-TABS) 100 MG tablet   2. Upper respiratory tract infection, unspecified type     3. Lymphadenopathy         Complete full course of antibiotic. I also recommended Flonase and an antihistamine for sinus symptoms. she was also encouraged to use tylenol or ibuprofen for pain/fever. Increase water intake. Use cool mist humidifier at bedtime. Use nasal saline flush as needed. Good hand hygiene. she was instructed to return if there is no improvement or symptoms worsen. The use, risks, benefits, and side effects of prescribed or recommended medications were discussed. All questions were answered and the patient/caregiver voiced understanding. No orders of the defined types were placed in this encounter.         Electronically signed by JEFFERY Lopez CNP on 3/6/21 at 12:59 PM EST

## 2021-03-08 ENCOUNTER — APPOINTMENT (OUTPATIENT)
Dept: GENERAL RADIOLOGY | Age: 26
End: 2021-03-08
Payer: COMMERCIAL

## 2021-03-08 ENCOUNTER — HOSPITAL ENCOUNTER (EMERGENCY)
Age: 26
Discharge: HOME | End: 2021-03-08
Attending: EMERGENCY MEDICINE
Payer: COMMERCIAL

## 2021-03-08 VITALS
HEIGHT: 63 IN | BODY MASS INDEX: 34.73 KG/M2 | HEART RATE: 83 BPM | OXYGEN SATURATION: 98 % | WEIGHT: 196 LBS | DIASTOLIC BLOOD PRESSURE: 62 MMHG | RESPIRATION RATE: 14 BRPM | SYSTOLIC BLOOD PRESSURE: 135 MMHG | TEMPERATURE: 98.8 F

## 2021-03-08 DIAGNOSIS — R00.2 PALPITATIONS: Primary | ICD-10-CM

## 2021-03-08 LAB
-: ABNORMAL
-: NORMAL
ABSOLUTE EOS #: 0.1 K/UL (ref 0–0.44)
ABSOLUTE IMMATURE GRANULOCYTE: 0.05 K/UL (ref 0–0.3)
ABSOLUTE LYMPH #: 2.31 K/UL (ref 1.1–3.7)
ABSOLUTE MONO #: 0.54 K/UL (ref 0.1–1.2)
AMORPHOUS: ABNORMAL
ANION GAP SERPL CALCULATED.3IONS-SCNC: 9 MMOL/L (ref 9–17)
BACTERIA: ABNORMAL
BASOPHILS # BLD: 1 % (ref 0–2)
BASOPHILS ABSOLUTE: 0.06 K/UL (ref 0–0.2)
BILIRUBIN URINE: NEGATIVE
BUN BLDV-MCNC: 17 MG/DL (ref 6–20)
BUN/CREAT BLD: 27 (ref 9–20)
CALCIUM SERPL-MCNC: 9.3 MG/DL (ref 8.6–10.4)
CASTS UA: ABNORMAL /LPF (ref 0–2)
CHLORIDE BLD-SCNC: 104 MMOL/L (ref 98–107)
CO2: 25 MMOL/L (ref 20–31)
COLOR: NORMAL
COMMENT UA: NORMAL
CREAT SERPL-MCNC: 0.63 MG/DL (ref 0.5–0.9)
CRYSTALS, UA: ABNORMAL /HPF
D-DIMER QUANTITATIVE: <0.27 MG/L FEU (ref 0–0.59)
DIFFERENTIAL TYPE: ABNORMAL
EKG ATRIAL RATE: 61 BPM
EKG ATRIAL RATE: 75 BPM
EKG P AXIS: 20 DEGREES
EKG P AXIS: 65 DEGREES
EKG P-R INTERVAL: 146 MS
EKG P-R INTERVAL: 152 MS
EKG Q-T INTERVAL: 376 MS
EKG Q-T INTERVAL: 406 MS
EKG QRS DURATION: 80 MS
EKG QRS DURATION: 84 MS
EKG QTC CALCULATION (BAZETT): 408 MS
EKG QTC CALCULATION (BAZETT): 419 MS
EKG R AXIS: 41 DEGREES
EKG R AXIS: 75 DEGREES
EKG T AXIS: 13 DEGREES
EKG T AXIS: 60 DEGREES
EKG VENTRICULAR RATE: 61 BPM
EKG VENTRICULAR RATE: 75 BPM
EOSINOPHILS RELATIVE PERCENT: 1 % (ref 1–4)
EPITHELIAL CELLS UA: ABNORMAL /HPF (ref 0–5)
GFR AFRICAN AMERICAN: >60 ML/MIN
GFR NON-AFRICAN AMERICAN: >60 ML/MIN
GFR SERPL CREATININE-BSD FRML MDRD: ABNORMAL ML/MIN/{1.73_M2}
GFR SERPL CREATININE-BSD FRML MDRD: ABNORMAL ML/MIN/{1.73_M2}
GLUCOSE BLD-MCNC: 114 MG/DL (ref 70–99)
GLUCOSE URINE: NEGATIVE
HCG QUALITATIVE: NEGATIVE
HCT VFR BLD CALC: 43.2 % (ref 36.3–47.1)
HEMOGLOBIN: 14.1 G/DL (ref 11.9–15.1)
IMMATURE GRANULOCYTES: 1 %
KETONES, URINE: NEGATIVE
LEUKOCYTE ESTERASE, URINE: NEGATIVE
LYMPHOCYTES # BLD: 30 % (ref 24–43)
MCH RBC QN AUTO: 29.7 PG (ref 25.2–33.5)
MCHC RBC AUTO-ENTMCNC: 32.6 G/DL (ref 25.2–33.5)
MCV RBC AUTO: 90.9 FL (ref 82.6–102.9)
MONOCYTES # BLD: 7 % (ref 3–12)
MUCUS: ABNORMAL
NITRITE, URINE: NEGATIVE
NRBC AUTOMATED: 0 PER 100 WBC
OTHER OBSERVATIONS UA: ABNORMAL
PDW BLD-RTO: 12 % (ref 11.8–14.4)
PH UA: 6 (ref 5–6)
PLATELET # BLD: 276 K/UL (ref 138–453)
PLATELET ESTIMATE: ABNORMAL
PMV BLD AUTO: 8.8 FL (ref 8.1–13.5)
POTASSIUM SERPL-SCNC: 4.1 MMOL/L (ref 3.7–5.3)
PROTEIN UA: NEGATIVE
RBC # BLD: 4.75 M/UL (ref 3.95–5.11)
RBC # BLD: ABNORMAL 10*6/UL
RBC UA: ABNORMAL /HPF (ref 0–4)
REASON FOR REJECTION: NORMAL
RENAL EPITHELIAL, UA: ABNORMAL /HPF
SEG NEUTROPHILS: 60 % (ref 36–65)
SEGMENTED NEUTROPHILS ABSOLUTE COUNT: 4.6 K/UL (ref 1.5–8.1)
SODIUM BLD-SCNC: 138 MMOL/L (ref 135–144)
SPECIFIC GRAVITY UA: 1.01 (ref 1.01–1.02)
TRICHOMONAS: ABNORMAL
TROPONIN INTERP: NORMAL
TROPONIN INTERP: NORMAL
TROPONIN T: NORMAL NG/ML
TROPONIN T: NORMAL NG/ML
TROPONIN, HIGH SENSITIVITY: <6 NG/L (ref 0–14)
TROPONIN, HIGH SENSITIVITY: <6 NG/L (ref 0–14)
TURBIDITY: NORMAL
URINE HGB: NEGATIVE
UROBILINOGEN, URINE: NORMAL
WBC # BLD: 7.7 K/UL (ref 3.5–11.3)
WBC # BLD: ABNORMAL 10*3/UL
WBC UA: ABNORMAL /HPF (ref 0–4)
YEAST: ABNORMAL
ZZ NTE CLEAN UP: ORDERED TEST: NORMAL
ZZ NTE WITH NAME CLEAN UP: SPECIMEN SOURCE: NORMAL

## 2021-03-08 PROCEDURE — 36415 COLL VENOUS BLD VENIPUNCTURE: CPT

## 2021-03-08 PROCEDURE — 99284 EMERGENCY DEPT VISIT MOD MDM: CPT

## 2021-03-08 PROCEDURE — 84484 ASSAY OF TROPONIN QUANT: CPT

## 2021-03-08 PROCEDURE — 93005 ELECTROCARDIOGRAM TRACING: CPT | Performed by: EMERGENCY MEDICINE

## 2021-03-08 PROCEDURE — 2580000003 HC RX 258: Performed by: EMERGENCY MEDICINE

## 2021-03-08 PROCEDURE — 80048 BASIC METABOLIC PNL TOTAL CA: CPT

## 2021-03-08 PROCEDURE — 81001 URINALYSIS AUTO W/SCOPE: CPT

## 2021-03-08 PROCEDURE — 85025 COMPLETE CBC W/AUTO DIFF WBC: CPT

## 2021-03-08 PROCEDURE — 84703 CHORIONIC GONADOTROPIN ASSAY: CPT

## 2021-03-08 PROCEDURE — 71045 X-RAY EXAM CHEST 1 VIEW: CPT

## 2021-03-08 PROCEDURE — 85379 FIBRIN DEGRADATION QUANT: CPT

## 2021-03-08 RX ORDER — 0.9 % SODIUM CHLORIDE 0.9 %
1000 INTRAVENOUS SOLUTION INTRAVENOUS ONCE
Status: COMPLETED | OUTPATIENT
Start: 2021-03-08 | End: 2021-03-08

## 2021-03-08 RX ADMIN — SODIUM CHLORIDE 1000 ML: 9 INJECTION, SOLUTION INTRAVENOUS at 15:13

## 2021-03-08 NOTE — ED PROVIDER NOTES
past surgical history that includes [place] tooth extraction; Endoscopic ultrasonography, GI; Colonoscopy (2010); Upper gastrointestinal endoscopy (2010); and ovarian cyst removal (Right, 09/27/2016). CURRENT MEDICATIONS       Previous Medications    ACETAMINOPHEN (TYLENOL) 325 MG TABLET    Take 650 mg by mouth every 6 hours as needed for Pain    DOXYCYCLINE HYCLATE (VIBRA-TABS) 100 MG TABLET    Take 1 tablet by mouth 2 times daily for 10 days    LORATADINE-PSEUDOEPHEDRINE (CLARITIN-D 24 HOUR PO)    Take 10 mg by mouth    VENLAFAXINE (EFFEXOR XR) 75 MG EXTENDED RELEASE CAPSULE    Take 1 capsule by mouth daily       ALLERGIES     is allergic to other and ibuprofen. FAMILY HISTORY     She indicated that her mother is alive. She indicated that her father is alive. She indicated that her maternal grandmother is alive. She indicated that her maternal grandfather is alive. She indicated that her paternal grandmother is alive. She indicated that her paternal grandfather is alive. She indicated that her maternal uncle is alive. She indicated that the status of her other is unknown. She indicated that the status of her neg hx is unknown.     family history includes Deep Vein Thrombosis in her father and mother; Depression in her father and mother; Diabetes in her paternal grandfather; Other in her father and mother; Thyroid Disease in her maternal grandmother, maternal uncle, and another family member. SOCIAL HISTORY      reports that she has never smoked. She has never used smokeless tobacco. She reports current alcohol use. She reports that she does not use drugs.     PHYSICAL EXAM       ED Triage Vitals [03/08/21 1448]   BP Temp Temp Source Pulse Resp SpO2 Height Weight   139/76 98.8 °F (37.1 °C) Tympanic 85 14 98 % 5' 3\" (1.6 m) 196 lb (88.9 kg)       Constitutional: Alert, oriented x3, nontoxic, answering questions appropriately, acting properly for age, in no acute distress   HEENT: Extraocular muscles intact, Neck: Trachea midline   Cardiovascular: Regular rhythm and rate no S3, S4, or murmurs   Respiratory: Clear to auscultation bilaterally no wheezes, rhonchi, rales, no respiratory distress no tachypnea no retractions no hypoxia  Gastrointestinal: Soft, nontender, nondistended, positive bowel sounds. No rebound, rigidity, or guarding. Musculoskeletal: No extremity pain or swelling no calf tenderness no asymmetry  Neurologic: Moving all 4 extremities without difficulty there are no gross focal neurologic deficits   Skin: Warm and dry       DIFFERENTIAL DIAGNOSIS/ MDM:     IV fluids and labs. EKG and chest x-ray.       HEART Risk Score:   0 = History   Highly Suspicious   2    Moderately Suspicious   1    Slight Suspicious  0  0 = EKG  Significant ST Depression 2    Nonspecific   1    Normal    0  0 = Age > or = 65   2    > 45 to < 65   1    < or = 45   0  0 = Risk Factors > or = 3   2    1 or 2    1    0    0  0 = Troponin > or = 3 times normal limit 2    > 1 and < 3 times limit  1    Normal    0  0 = Score  0 - 3    Low Risk     4 - 6    Moderate risk     7 - 10    High Risk  * Risk Factors include: Diabetes, Tobacco use, Hypertension, Hyperlipidemia, Family History of CAD and Obesity    PERC Criteria     n = Age      > or = 50  n = Heart Rate     > or = 100  n = Pulse Oximetry     < or = 95%  y = Previous History of DVT   Yes / No  n = Trauma or Surgery within 4 Weeks Yes / No  n = Hemoptysis    Yes / No  n = Exogenous Estrogen use  Yes / No  n = Unilateral Leg Swelling   Yes / No    Risk Criteria for Thoracic Aortic Dissection:     n = Sudden Onset, Maximal at Onset, with radiation to back  n = Neurologic Deficits with Chest Pain  n = Connective Tissue Disorder such as Marfan's or Raysa-Danlos  n = History of Aortic Valvular Disease  n = Pregnancy  n = Family History of Dissection  Elevated D-Dimer with any of the above      DIAGNOSTIC RESULTS     EKG: All EKG's are interpreted by the Emergency Department 1.010 1.010 - 1.025    Urine Hgb NEGATIVE NEGATIVE    pH, UA 6.0 5.0 - 6.0    Protein, UA NEGATIVE NEGATIVE    Urobilinogen, Urine Normal Normal    Nitrite, Urine NEGATIVE NEGATIVE    Leukocyte Esterase, Urine NEGATIVE NEGATIVE    Urinalysis Comments NOT REPORTED    SPECIMEN REJECTION   Result Value Ref Range    Specimen Source ER DRAW     Ordered Test BMP, TROP     Reason for Rejection Unable to perform testing: Specimen hemolyzed. - NOT REPORTED    Basic Metabolic Panel   Result Value Ref Range    Glucose 114 (H) 70 - 99 mg/dL    BUN 17 6 - 20 mg/dL    CREATININE 0.63 0.50 - 0.90 mg/dL    Bun/Cre Ratio 27 (H) 9 - 20    Calcium 9.3 8.6 - 10.4 mg/dL    Sodium 138 135 - 144 mmol/L    Potassium 4.1 3.7 - 5.3 mmol/L    Chloride 104 98 - 107 mmol/L    CO2 25 20 - 31 mmol/L    Anion Gap 9 9 - 17 mmol/L    GFR Non-African American >60 >60 mL/min    GFR African American >60 >60 mL/min    GFR Comment          GFR Staging NOT REPORTED    Troponin   Result Value Ref Range    Troponin, High Sensitivity <6 0 - 14 ng/L    Troponin T NOT REPORTED <0.03 ng/mL    Troponin Interp NOT REPORTED    Microscopic Urinalysis   Result Value Ref Range    -          WBC, UA 0 TO 4 0 - 4 /HPF    RBC, UA 0 TO 4 0 - 4 /HPF    Casts UA NOT REPORTED 0 - 2 /LPF    Crystals, UA NOT REPORTED None /HPF    Epithelial Cells UA 0 TO 4 0 - 5 /HPF    Renal Epithelial, UA NOT REPORTED 0 /HPF    Bacteria, UA TRACE (A) None    Mucus, UA NOT REPORTED None    Trichomonas, UA NOT REPORTED None    Amorphous, UA NOT REPORTED None    Other Observations UA NOT REPORTED NOT REQ. Yeast, UA NOT REPORTED None       Not indicated unless otherwise documented above    RADIOLOGY:   I reviewed the radiologist interpretations:    XR CHEST PORTABLE   Final Result   No acute cardiopulmonary process.              Not indicated unless otherwise documented above    EMERGENCY DEPARTMENT COURSE:     The patient was given the following medications:  Orders Placed This Encounter Medications    0.9 % sodium chloride bolus        Vitals:   -------------------------  /62   Pulse 83   Temp 98.8 °F (37.1 °C) (Tympanic)   Resp 14   Ht 5' 3\" (1.6 m)   Wt 196 lb (88.9 kg)   LMP 02/04/2021   SpO2 98%   BMI 34.72 kg/m²       3:50 PM D-dimer negative. CBC and BMP also normal.  Awaiting troponin. Urinalysis no infection. 4:40 PM resting comfortably second troponin pending. Repeat EKG unremarkable. Denies chest pain or shortness of breath no palpitations no abnormalities on the heart monitor. Heart score 0.    5:35 PM repeat troponin negative. Low suspicion that this is cardiac however patient concerned because she looked at the monitor and her heart rate jumped from 71 to 86 by just looking at it. She describes this as being something that she has been dealing with off-and-on and had addressed it with her family doctor in the past and had her get a heart rate monitor. Recommend following up with cardiology for possible Holter monitor and return if worsening symptoms or other concerns. The patient understands that at this time there is no evidence for a more malignant underlying process, but also understands that early in the process of an illness or injury, an emergency department workup can be falsely reassuring. Routine discharge counseling was given, and it is understood that worsening, changing or persistent symptoms should prompt an immediate call or follow up with their primary physician or return to the emergency department. The importance of appropriate follow up was also discussed. I have reviewed the disposition diagnosis. I have answered the questions and given discharge instructions. There was voiced understanding of these instructions and no further questions or complaints. CRITICAL CARE:    None    CONSULTS:    None    PROCEDURES:    None      OARRS Report if indicated             FINAL IMPRESSION      1.  Palpitations          DISPOSITION/PLAN DISPOSITION          CONDITION ON DISPOSITION: STABLE       PATIENT REFERRED TO:  Ema Balderas DO  [address]  [phone]            DISCHARGE MEDICATIONS:  New Prescriptions    No medications on file       (Please note that portions of thisnote were completed with a voice recognition program.  Efforts were made to edit the dictations but occasionally words are mis-transcribed.)    Prakash DO  Attending Emergency Physician      Jefe Grady DO  03/08/21 1730

## 2021-03-09 ENCOUNTER — PATIENT MESSAGE (OUTPATIENT)
Dept: PRIMARY CARE CLINIC | Age: 26
End: 2021-03-09

## 2021-03-09 ENCOUNTER — OFFICE VISIT (OUTPATIENT)
Dept: PRIMARY CARE CLINIC | Age: 26
End: 2021-03-09
Payer: COMMERCIAL

## 2021-03-09 VITALS
HEART RATE: 82 BPM | BODY MASS INDEX: 34.73 KG/M2 | SYSTOLIC BLOOD PRESSURE: 108 MMHG | RESPIRATION RATE: 16 BRPM | OXYGEN SATURATION: 97 % | HEIGHT: 63 IN | TEMPERATURE: 97.3 F | DIASTOLIC BLOOD PRESSURE: 64 MMHG | WEIGHT: 196 LBS

## 2021-03-09 DIAGNOSIS — F41.9 ANXIETY: ICD-10-CM

## 2021-03-09 DIAGNOSIS — T50.905A ADVERSE EFFECT OF DRUG, INITIAL ENCOUNTER: Primary | ICD-10-CM

## 2021-03-09 DIAGNOSIS — R00.2 PALPITATIONS: Primary | ICD-10-CM

## 2021-03-09 PROCEDURE — 99214 OFFICE O/P EST MOD 30 MIN: CPT | Performed by: FAMILY MEDICINE

## 2021-03-09 RX ORDER — ESCITALOPRAM OXALATE 5 MG/1
5 TABLET ORAL DAILY
Qty: 30 TABLET | Refills: 2 | Status: SHIPPED | OUTPATIENT
Start: 2021-03-09 | End: 2021-10-06

## 2021-03-09 ASSESSMENT — ENCOUNTER SYMPTOMS
TROUBLE SWALLOWING: 0
CONSTIPATION: 0
ABDOMINAL PAIN: 0
COUGH: 0
SINUS PRESSURE: 0
EYE REDNESS: 0
VOMITING: 0
DIARRHEA: 0
WHEEZING: 0
RHINORRHEA: 0
EYE DISCHARGE: 0
SHORTNESS OF BREATH: 0
SORE THROAT: 0
NAUSEA: 1

## 2021-03-09 ASSESSMENT — PATIENT HEALTH QUESTIONNAIRE - PHQ9
1. LITTLE INTEREST OR PLEASURE IN DOING THINGS: 0
2. FEELING DOWN, DEPRESSED OR HOPELESS: 0

## 2021-03-09 NOTE — PROGRESS NOTES
3/9/2021     Juma Mendoza (:  1995) is a 30 y.o. female, here for evaluation of the following medical concerns:    Other  This is a new problem. The current episode started yesterday. The problem has been resolved. Associated symptoms include nausea. Pertinent negatives include no abdominal pain, arthralgias, chest pain, chills, congestion, coughing, fatigue, fever, headaches, myalgias, neck pain, rash, sore throat, vomiting or weakness. Associated symptoms comments: Patient was seen yesterday evening in the emergency room after having abrupt onset of panic type symptoms. Had just started Effexor yesterday. Believes she had been on this in the past for her anxiety. Her anxiety has been much worse recently and so she had sent a message to her primary care provider to see if she can get placed back on it. When I reviewed her chart it appeared that she had actually been on Lexapro before and had not been on Effexor. She had taken 1 dose of the Effexor yesterday and then started feeling like her heart was racing. She was feeling nauseated and just did not feel well. Felt very panicked. Did go to the emergency room and had a fairly comprehensive work-up done at that time that did not reveal any significant concerning abnormalities. Comes in today for reassessment. Does note that today she feels better overall. No palpitations. Slightly nauseated. Is having some anxiety, but this has been an ongoing issue. . She has tried nothing for the symptoms. Did review patient's med list, allergies, social history,pmhx and pshx today as noted in the record. Review of Systems   Constitutional: Negative for chills, fatigue and fever. HENT: Negative for congestion, ear pain, postnasal drip, rhinorrhea, sinus pressure, sore throat and trouble swallowing. Eyes: Negative for discharge and redness. Respiratory: Negative for cough, shortness of breath and wheezing.     Cardiovascular: Positive for palpitations. Negative for chest pain. Gastrointestinal: Positive for nausea. Negative for abdominal pain, constipation, diarrhea and vomiting. Genitourinary: Negative for dysuria, flank pain, frequency and urgency. Musculoskeletal: Negative for arthralgias, myalgias and neck pain. Skin: Negative for rash and wound. Allergic/Immunologic: Negative for environmental allergies. Neurological: Negative for dizziness, weakness, light-headedness and headaches. Hematological: Negative for adenopathy. Psychiatric/Behavioral: Negative for dysphoric mood. The patient is nervous/anxious. Prior to Visit Medications    Medication Sig Taking? Authorizing Provider   doxycycline hyclate (VIBRA-TABS) 100 MG tablet Take 1 tablet by mouth 2 times daily for 10 days Yes JEFFERY Scott - CNP   venlafaxine (EFFEXOR XR) 75 MG extended release capsule Take 1 capsule by mouth daily Yes Thiago Parikh MD   Loratadine-Pseudoephedrine (CLARITIN-D 24 HOUR PO) Take 10 mg by mouth Yes Historical Provider, MD   acetaminophen (TYLENOL) 325 MG tablet Take 650 mg by mouth every 6 hours as needed for Pain Yes Historical Provider, MD        Social History     Tobacco Use    Smoking status: Never Smoker    Smokeless tobacco: Never Used   Substance Use Topics    Alcohol use: Yes     Alcohol/week: 0.0 standard drinks     Comment: socially        Vitals:    03/09/21 1233   BP: 108/64   Pulse: 82   Resp: 16   Temp: 97.3 °F (36.3 °C)   SpO2: 97%   Weight: 196 lb (88.9 kg)   Height: 5' 3\" (1.6 m)     Estimated body mass index is 34.72 kg/m² as calculated from the following:    Height as of this encounter: 5' 3\" (1.6 m). Weight as of this encounter: 196 lb (88.9 kg). Physical Exam  Vitals signs and nursing note reviewed. Constitutional:       General: She is not in acute distress. Appearance: Normal appearance. She is well-developed. She is not diaphoretic. HENT:      Head: Normocephalic and atraumatic. Right Ear: Tympanic membrane, ear canal and external ear normal.      Left Ear: Tympanic membrane, ear canal and external ear normal.      Nose: Nose normal.      Mouth/Throat:      Mouth: Mucous membranes are moist.      Pharynx: Oropharynx is clear. No oropharyngeal exudate. Eyes:      General:         Right eye: No discharge. Left eye: No discharge. Conjunctiva/sclera: Conjunctivae normal.      Pupils: Pupils are equal, round, and reactive to light. Neck:      Musculoskeletal: Normal range of motion and neck supple. Thyroid: No thyromegaly. Cardiovascular:      Rate and Rhythm: Normal rate and regular rhythm. Heart sounds: Normal heart sounds. Pulmonary:      Effort: Pulmonary effort is normal.      Breath sounds: Normal breath sounds. No wheezing or rales. Abdominal:      General: Bowel sounds are normal. There is no distension. Palpations: Abdomen is soft. Tenderness: There is no abdominal tenderness. Lymphadenopathy:      Cervical: No cervical adenopathy. Skin:     General: Skin is warm and dry. Findings: No rash. Neurological:      Mental Status: She is alert and oriented to person, place, and time. Psychiatric:         Mood and Affect: Mood is anxious. Behavior: Behavior normal.         Thought Content: Thought content normal.         Judgment: Judgment normal.         ASSESSMENT/PLAN:    Encounter Diagnoses   Name Primary?  Adverse effect of drug, initial encounter Yes    Anxiety      Orders Placed This Encounter   Medications    escitalopram (LEXAPRO) 5 MG tablet     Sig: Take 1 tablet by mouth daily     Dispense:  30 tablet     Refill:  2     No orders of the defined types were placed in this encounter. Suspect symptoms are related to a reaction to the Effexor. Did switch her to Lexapro as she been on low-dose Lexapro in the past which did provide her with benefit.     Patient should follow-up with her PCP for continued management of her ongoing chronic anxiety symptoms. Return  if no improvement in symptoms or if any further symptoms arise. No follow-ups on file. An electronic signature was used to authenticate this note.     --Nissa Ellis,  on 3/9/2021 at 1:03 PM

## 2021-03-10 NOTE — TELEPHONE ENCOUNTER
From: Nilda Eric  To: Clau Jaffe MD  Sent: 3/9/2021 9:48 PM EST  Subject: Non-Urgent Medical Question    I'm having the heart palpitations again after taking a warm shower. They wanted me to see the cardiologist about wearing the monitor. This seems to be triggered by heat for some reason. I'm taking deep breaths and drank some water, which slows my bpm, but I can still feel it in my chest.      Rebekah DUFFY

## 2021-03-11 ENCOUNTER — PATIENT MESSAGE (OUTPATIENT)
Dept: PRIMARY CARE CLINIC | Age: 26
End: 2021-03-11

## 2021-03-11 NOTE — TELEPHONE ENCOUNTER
From: Fred Valentin  To: Mickie Thorpe DO  Sent: 3/11/2021 9:15 AM EST  Subject: Non-Urgent Medical Question    I had an amaretto sour drink with my parents last and took my antibiotic (doxycycline hyclate 100mg) several hours later, but last night and this morning I've been having red, but not raised, itchy dots on my back and have an occasionally itchy scalp and ear. Should I keep using the antibiotic?

## 2021-03-12 ENCOUNTER — PATIENT MESSAGE (OUTPATIENT)
Dept: BEHAVIORAL/MENTAL HEALTH | Age: 26
End: 2021-03-12

## 2021-03-17 ENCOUNTER — HOSPITAL ENCOUNTER (EMERGENCY)
Age: 26
Discharge: HOME | End: 2021-03-18
Attending: EMERGENCY MEDICINE
Payer: COMMERCIAL

## 2021-03-17 DIAGNOSIS — J02.9 PHARYNGITIS, UNSPECIFIED ETIOLOGY: ICD-10-CM

## 2021-03-17 DIAGNOSIS — F41.0 PANIC ATTACK: Primary | ICD-10-CM

## 2021-03-17 PROCEDURE — 6370000000 HC RX 637 (ALT 250 FOR IP): Performed by: EMERGENCY MEDICINE

## 2021-03-17 PROCEDURE — 99285 EMERGENCY DEPT VISIT HI MDM: CPT

## 2021-03-17 RX ORDER — LORAZEPAM 0.5 MG/1
0.5 TABLET ORAL ONCE
Status: COMPLETED | OUTPATIENT
Start: 2021-03-17 | End: 2021-03-17

## 2021-03-17 RX ADMIN — LORAZEPAM 0.5 MG: 0.5 TABLET ORAL at 23:47

## 2021-03-18 VITALS
OXYGEN SATURATION: 98 % | TEMPERATURE: 98.5 F | HEIGHT: 63 IN | HEART RATE: 79 BPM | BODY MASS INDEX: 33.31 KG/M2 | RESPIRATION RATE: 16 BRPM | DIASTOLIC BLOOD PRESSURE: 76 MMHG | SYSTOLIC BLOOD PRESSURE: 114 MMHG | WEIGHT: 188 LBS

## 2021-03-18 LAB
POC STREP A ANTIGEN: NEGATIVE
STREP A AG, THROAT, POCT: NEGATIVE

## 2021-03-18 PROCEDURE — 87651 STREP A DNA AMP PROBE: CPT

## 2021-03-18 ASSESSMENT — ENCOUNTER SYMPTOMS
NAUSEA: 0
SORE THROAT: 1
TROUBLE SWALLOWING: 0
SHORTNESS OF BREATH: 0
COUGH: 0
VOMITING: 0

## 2021-03-18 NOTE — ED PROVIDER NOTES
[facility] ED  [address]  [place] [address]  Phone: [phone]  eMERGENCY dEPARTMENT eNCOUnter      Pt Name: Ryan Acosta  MRN: [id number]  Keesha 1995  Date of evaluation: 3/18/21      CHIEF COMPLAINT     Chief Complaint   Patient presents with    Panic Attack         HISTORY OF PRESENT ILLNESS    Ryan Acosta is a 30 y.o. female who presents today via EMS for complaints of panic attack. Patient states that she has had a sore throat for a couple of days after eating [employer]. Patient states that she was thinking about her sore throat and that it was painful to swallow and wondering if she was having significant swelling in her throat. At this point she started to be fearful of her throat swelling shot. This is when the symptoms began this evening. Has been diagnosed with panic attacks. Was taking Effexor and did not tolerate it very well. This was recently stopped and she was prescribed Lexapro which she has not started taking yet. Patient describes that she is having palpitations but does not have chest pain or shortness of breath. She also feels shaky. She states that it difficult to concentrate during these episodes. 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 She was on estrogen-containing birth control when that occurred. No recent changes in activity, no leg swelling no calf tenderness. Patient denies history of hypertension known hyperlipidemia diabetes smoking or drug use. Patient is required to take routine Covid testing for college classes. Had Covid test today which was negative. REVIEW OF SYSTEMS     Review of Systems   Constitutional: Negative for fever. HENT: Positive for sore throat. Negative for trouble swallowing. Eyes: Negative for visual disturbance. Respiratory: Negative for cough and shortness of breath. Cardiovascular: Positive for palpitations. Negative for chest pain and leg swelling.    Gastrointestinal: Negative for nausea and vomiting. Genitourinary: Negative for dysuria. Skin: Negative for rash. Neurological: Negative for headaches. Psychiatric/Behavioral: Negative for suicidal ideas. The patient is nervous/anxious. All other systems reviewed and are negative. PAST MEDICAL HISTORY    has a past medical history of Acne, Anisocoria, Chickenpox, DVT (deep venous thrombosis) ([address] H/O deep venous thrombosis, Obesity, Psoriasis, Seasonal allergies, Social anxiety disorder, Swollen tonsil, and Vitamin D deficiency. SURGICAL HISTORY      has a past surgical history that includes [place] tooth extraction; Endoscopic ultrasonography, GI; Colonoscopy (2010); Upper gastrointestinal endoscopy (2010); and ovarian cyst removal (Right, 09/27/2016). CURRENT MEDICATIONS       Discharge Medication List as of 3/18/2021  1:26 AM      CONTINUE these medications which have NOT CHANGED    Details   escitalopram (LEXAPRO) 5 MG tablet Take 1 tablet by mouth daily, Disp-30 tablet, R-2Normal      Loratadine-Pseudoephedrine (CLARITIN-D 24 HOUR PO) Take 10 mg by mouthHistorical Med      acetaminophen (TYLENOL) 325 MG tablet Take 650 mg by mouth every 6 hours as needed for PainHistorical Med             ALLERGIES     is allergic to effexor xr [venlafaxine hcl]; other; and ibuprofen. FAMILY HISTORY     She indicated that her mother is alive. She indicated that her father is alive. She indicated that her maternal grandmother is alive. She indicated that her maternal grandfather is alive. She indicated that her paternal grandmother is alive. She indicated that her paternal grandfather is alive. She indicated that her maternal uncle is alive. She indicated that the status of her other is unknown. She indicated that the status of her neg hx is unknown.     family history includes Deep Vein Thrombosis in her father and mother; Depression in her father and mother; Diabetes in her paternal grandfather;  Other in her father and mother; Thyroid Disease in her maternal grandmother, maternal uncle, and another family member. SOCIAL HISTORY      reports that she has never smoked. She has never used smokeless tobacco. She reports current alcohol use. She reports that she does not use drugs. PHYSICAL EXAM     INITIAL VITALS:  height is 5' 3\" (1.6 m) and weight is 188 lb (85.3 kg). Her tympanic temperature is 98.5 °F (36.9 °C). Her blood pressure is 114/76 and her pulse is 79. Her respiration is 16 and oxygen saturation is 98%. Physical Exam  Vitals signs reviewed. Constitutional:       General: She is not in acute distress. Appearance: Normal appearance. She is not ill-appearing. Comments: Appears somewhat anxious. HENT:      Head: Normocephalic and atraumatic. Mouth/Throat:      Mouth: Mucous membranes are moist.      Pharynx: Posterior oropharyngeal erythema present. No oropharyngeal exudate. Comments: Uvula is midline. No posterior pharynx swelling. No stridor. Mild anterior cervical lymphadenopathy. Eyes:      Extraocular Movements: Extraocular movements intact. Pupils: Pupils are equal, round, and reactive to light. Neck:      Musculoskeletal: Normal range of motion and neck supple. No neck rigidity. Cardiovascular:      Rate and Rhythm: Regular rhythm. Pulses: Normal pulses. Heart sounds: Normal heart sounds. Comments: On my evaluation heart rate is 98 bpm.  Pulmonary:      Effort: Pulmonary effort is normal. No respiratory distress. Breath sounds: Normal breath sounds. No wheezing. Abdominal:      Palpations: Abdomen is soft. Tenderness: There is abdominal tenderness. There is rebound. There is no guarding. Musculoskeletal: Normal range of motion. General: No swelling or tenderness. Right lower leg: No edema. Left lower leg: No edema. Lymphadenopathy:      Cervical: Cervical adenopathy present. Skin:     General: Skin is warm and dry.       Capillary Refill: Capillary refill takes less than 2 seconds. Findings: No rash. Neurological:      General: No focal deficit present. Mental Status: She is alert and oriented to person, place, and time. Cranial Nerves: No cranial nerve deficit. Sensory: No sensory deficit. Motor: No weakness. Psychiatric:      Comments: Patient appears anxious with out suicidal homicidal ideation or hallucinations. DIFFERENTIAL DIAGNOSIS / MDM / EMERGENCY DEPARTMENT COURSE:     Patient has responded well to Ativan in the emergency department during her December visit. We will try this. This improved her symptoms and she was feeling back to normal.  Patient with also sore throat mild erythema and lymphadenopathy discussed possible strep test patient agreeable. Rapid strep negative culture is sent. Patient was offered prescription for Vistaril. Patient did not want to try this. Is going to try to take her Lexapro. Patient inquires about Ativan for home. Discussed the risks of dependency associated with benzodiazepines. After this patient is willing to try other interventions. Patient educated on the warning signs which return to the emergency department. She did have questions about other longstanding health conditions such as intermittent rashes, intermittent GI symptoms such as diarrhea and food intolerances, and a few other long-term health issues that were answered to the best of my ability but ultimately she will need to follow-up with her PCP for longer term treatment. At this point there is no indication in her presentation history or physical that indicates a further work-up and/or admission I feel that outpatient treatment is appropriate. Patient is agreeable. I have reviewed the disposition diagnosis with the patient and or their family/guardian. I have answered their questions and givendischarge instructions.   They voiced understanding of these instructions and did not have any further questions or complaints. DIAGNOSTIC RESULTS     EKG: All EKG's are interpreted by the Emergency Department Physician who either signs or Co-signs this chart inthe absence of a cardiologist.        RADIOLOGY:   I directly visualized the following plain film images and reviewed the radiologistinterpretations of radiologic studies:    No orders to display        Xr Chest Portable    Result Date: 3/8/2021  EXAMINATION: ONE XRAY VIEW OF THE CHEST 3/8/2021 3:55 pm COMPARISON: 22 December 2020 HISTORY: ORDERING SYSTEM PROVIDED HISTORY: palpitations TECHNOLOGIST PROVIDED HISTORY: palpitations Reason for Exam: Tachycardia; palpitations; GP used FINDINGS: AP portable view of the chest time stamped at 1552 hours demonstrates overlying cardiac monitoring electrodes. Heart size is normal.  No vascular congestion, focal consolidation, effusion, or pneumothorax is noted. Osseous and mediastinal structures are age-appropriate. No acute cardiopulmonary process. LABS:  Results for orders placed or performed during the hospital encounter of 03/17/21   POCT rapid strep A   Result Value Ref Range    Strep A AG, throat, POCT negative    POC Strep A Antigen   Result Value Ref Range    POC Strep A Antigen NEGATIVE NEGATIVE       EMERGENCY DEPARTMENT COURSE:   Vitals:    Vitals:    03/17/21 2329 03/18/21 0000 03/18/21 0030   BP: 139/76 121/78 114/76   Pulse: 103  79   Resp: 18  16   Temp: 98.5 °F (36.9 °C)     TempSrc: Tympanic     SpO2: 100% 100% 98%   Weight: 188 lb (85.3 kg)     Height: 5' 3\" (1.6 m)       -------------------------  BP: 114/76, Temp: 98.5 °F (36.9 °C), Pulse: 79, Resp: 16      CONSULTS:  None    PROCEDURES:  None    FINAL IMPRESSION      1. Panic attack    2.  Pharyngitis, unspecified etiology          DISPOSITION/PLAN   DISPOSITION Decision To Discharge 03/18/2021 01:25:15 AM      PATIENT REFERRED TO:  Parviz Brannon MD  [address] [place] [address]  [phone]    In 2 days        DISCHARGE MEDICATIONS:  Discharge Medication List as of 3/18/2021  1:26 AM          (Please note that portions of this note were completed with avoice recognition program.  Efforts were made to edit the dictations but occasionally words are mis-transcribed.)    Kyaw Earl MD, [facility]  Attending Emergency Medicine Physician       Kyaw Earl MD  03/18/21 [id number]       Kyaw Earl MD  03/18/21 [id number]

## 2021-03-18 NOTE — ED NOTES
Patient states she is \"feeling better and no longer feels like she is having a panic attack\". Dr. Elisabet Seals notified.       Esteban Steven RN  03/18/21 1521

## 2021-03-18 NOTE — ED TRIAGE NOTES
Patient presents to the ED with complaints of a panic attack. She states her throat has been hurting since she ate at [employer] a few days ago. She was tested for COVID today which was negative. Patient has a history of panic attacks in which she is prescribed Lexapro for. Patient states she stopped taking this medication because she \"didn't like the way it made me feel\". VVS. No further questions or concerns at this time. Call light within reach.

## 2021-03-19 ENCOUNTER — HOSPITAL ENCOUNTER (OUTPATIENT)
Age: 26
Setting detail: SPECIMEN
Discharge: HOME | End: 2021-03-19
Payer: COMMERCIAL

## 2021-03-19 ENCOUNTER — OFFICE VISIT (OUTPATIENT)
Dept: PRIMARY CARE CLINIC | Age: 26
End: 2021-03-19
Payer: COMMERCIAL

## 2021-03-19 VITALS
RESPIRATION RATE: 18 BRPM | BODY MASS INDEX: 34.48 KG/M2 | WEIGHT: 194.6 LBS | HEIGHT: 63 IN | HEART RATE: 76 BPM | OXYGEN SATURATION: 99 % | DIASTOLIC BLOOD PRESSURE: 84 MMHG | SYSTOLIC BLOOD PRESSURE: 124 MMHG | TEMPERATURE: 97.1 F

## 2021-03-19 DIAGNOSIS — H92.01 ACUTE EAR PAIN, RIGHT: ICD-10-CM

## 2021-03-19 DIAGNOSIS — R09.81 CONGESTION OF NASAL SINUS: Primary | ICD-10-CM

## 2021-03-19 DIAGNOSIS — J02.9 PHARYNGITIS, UNSPECIFIED ETIOLOGY: ICD-10-CM

## 2021-03-19 DIAGNOSIS — R09.81 CONGESTION OF NASAL SINUS: ICD-10-CM

## 2021-03-19 DIAGNOSIS — Z20.822 PERSON UNDER INVESTIGATION FOR COVID-19: ICD-10-CM

## 2021-03-19 LAB
DIRECT EXAM: NORMAL
Lab: NORMAL
SPECIMEN DESCRIPTION: NORMAL

## 2021-03-19 PROCEDURE — U0005 INFEC AGEN DETEC AMPLI PROBE: HCPCS

## 2021-03-19 PROCEDURE — U0003 INFECTIOUS AGENT DETECTION BY NUCLEIC ACID (DNA OR RNA); SEVERE ACUTE RESPIRATORY SYNDROME CORONAVIRUS 2 (SARS-COV-2) (CORONAVIRUS DISEASE [COVID-19]), AMPLIFIED PROBE TECHNIQUE, MAKING USE OF HIGH THROUGHPUT TECHNOLOGIES AS DESCRIBED BY CMS-2020-01-R: HCPCS

## 2021-03-19 PROCEDURE — 99213 OFFICE O/P EST LOW 20 MIN: CPT | Performed by: NURSE PRACTITIONER

## 2021-03-19 ASSESSMENT — PATIENT HEALTH QUESTIONNAIRE - PHQ9
SUM OF ALL RESPONSES TO PHQ QUESTIONS 1-9: 2
2. FEELING DOWN, DEPRESSED OR HOPELESS: 1
SUM OF ALL RESPONSES TO PHQ9 QUESTIONS 1 & 2: 2

## 2021-03-19 ASSESSMENT — ENCOUNTER SYMPTOMS
VOMITING: 0
COUGH: 1
SORE THROAT: 1
DIARRHEA: 0
NAUSEA: 0

## 2021-03-19 NOTE — PROGRESS NOTES
[facility]  [facility] [place]  [place] [address]  Dept: [phone]  Dept Fax: [phone]  Loc: [phone]        [address] [place] [address]       Chief Complaint   Patient presents with    Sinusitis     right ear pain,right side lymph node sore,cough,sneezing,headache,started x 2 days was tested for covid on wednesday and was negative strep in ED was negative on wednesday in ED       Nurses Notes reviewed and I agree except as noted in the HPI. HISTORY OF PRESENT ILLNESS   Caity Monaco is a 30 y.o. female who presents to [facility] today (3/19/2021) for evaluation of:   Sinusitis  This is a new problem. The current episode started in the past 7 days. The problem is unchanged. There has been no fever. Associated symptoms include chills (yesterday), congestion, coughing, ear pain (right, started today), headaches (waxes/wanes) and a sore throat (intermittent). Treatments tried: claritin. REVIEW OF SYSTEMS     Review of Systems   Constitutional: Positive for appetite change and chills (yesterday). HENT: Positive for congestion, ear pain (right, started today) and sore throat (intermittent). Respiratory: Positive for cough. Cardiovascular: Negative for chest pain. Gastrointestinal: Negative for diarrhea, nausea and vomiting. Neurological: Positive for headaches (waxes/wanes).        PAST MEDICAL HISTORY         Diagnosis Date    Acne     history of     Anisocoria     history of     Chickenpox     history of      DVT (deep venous thrombosis) ([facility] [address]     H/O deep venous thrombosis 8/22/2020    In left leg, due to OCPs    Obesity     Psoriasis     Seasonal allergies     Social anxiety disorder     Swollen tonsil     history of     Vitamin D deficiency 12/14/2018       SURGICAL HISTORY     Patient  has a past surgical history that includes [place] tooth extraction; Endoscopic ultrasonography, GI; rhythm. Heart sounds: Normal heart sounds, S1 normal and S2 normal. No murmur. Pulmonary:      Effort: Pulmonary effort is normal. No accessory muscle usage or respiratory distress. Breath sounds: Normal breath sounds. No wheezing or rhonchi. Musculoskeletal: Normal range of motion. Lymphadenopathy:      Cervical: No cervical adenopathy. Skin:     General: Skin is warm and dry. Capillary Refill: Capillary refill takes less than 2 seconds. Neurological:      General: No focal deficit present. Mental Status: She is alert. DIAGNOSTIC RESULTS   Labs:No results found for this visit on 03/19/21. IMAGING:        CLINICAL COURSE:     Vitals:    03/19/21 1608   BP: 124/84   Pulse: 76   Resp: 18   Temp: 97.1 °F (36.2 °C)   TempSrc: Temporal   SpO2: 99%   Weight: 194 lb 9.6 oz (88.3 kg)   Height: 5' 3\" (1.6 m)           PROCEDURES:  None  FINAL IMPRESSION      1. Congestion of nasal sinus    2. Pharyngitis, unspecified etiology    3. Acute ear pain, right    4. Person under investigation for COVID-19         DISPOSITION/PLAN       Patient Instructions     Will notify you of covid test result as soon as available. You should isolate at home in an area away from family. If you must be around family members, please wear a mask. Quarantine at home until result is available. This means do not go to work/school, attend family gatherings, or invite others to your home until you know your test results. A work/school note will be provided for you. Symptoms appear viral; no antibiotic warranted at this time. The following are measures you can try at home to help provide symptom relief:    --Increase your water intake to help thin secretions and maintain hydration. Recommend 2-3 liters water/day. --May try warm salt water gargles, chloraseptic throat spray, or lozenges such as Cepacol sore throat lozenges, for throat pain. Cool beverages and popsicles can help as well.     --May try mucinex (guaifenesin) to help with congestion and robitussin (dextromethorphan) for persistent cough. Use cool mist humidifier at bedtime to moisturize air. Use nasal saline rinse as needed for congestion. --Tylenol or ibuprofen for fever/body aches/headache. Warm/cold packs to forehead and back of neck can help with headache pain. Warm baths/showers can sooth body aches also. Practice good hand hygiene and cover your cough and sneeze to prevent passing virus on. If symptoms worsen or fail to improve, please return to clinic. If you develop severe worsening of symptoms such as chest pain or difficulty breathing, please go to ER. Patient Education        Learning About Coronavirus [phone])  What is coronavirus (COVID-19)? COVID-19 is a disease caused by a new type of coronavirus. This illness was first found in December 2019. It has since spread worldwide. Coronaviruses are a large group of viruses. They cause the common cold. They also cause more serious illnesses like [place] East respiratory syndrome (MERS) and severe acute respiratory syndrome (SARS). COVID-19 is caused by a novel coronavirus. That means it's a new type that has not been seen in people before. What are the symptoms? Coronavirus (COVID-19) symptoms may include:  · Fever. · Cough. · Trouble breathing. · Chills or repeated shaking with chills. · Muscle pain. · Headache. · Sore throat. · New loss of taste or smell. · Vomiting. · Diarrhea. In severe cases, COVID-19 can cause pneumonia and make it hard to breathe without help from a machine. It can cause death. How is it diagnosed? COVID-19 is diagnosed with a viral test. This may also be called a PCR test or antigen test. It looks for evidence of the virus in your breathing passages or lungs (respiratory system). The test is most often done on a sample from the nose, throat, or lungs. It's sometimes done on a sample of saliva.  One way a sample is collected is by putting a long swab into the back of your nose. How is it treated? Mild cases of COVID-19 can be treated at home. Serious cases need treatment in the hospital. Treatment may include medicines to reduce symptoms, plus breathing support such as oxygen therapy or a ventilator. Some people may be placed on their belly to help their oxygen levels. Treatments that may help people who have COVID-19 include:  Antiviral medicines. These medicines treat viral infections. Remdesivir is an example. Immune-based therapy. These medicines help the immune system fight COVID-19. One example is bamlanivimab. It's a monoclonal antibody. Blood thinners. These medicines help prevent blood clots. People with severe illness are at risk for blood clots. How can you protect yourself and others? The best way to protect yourself from getting sick is to:  · Avoid areas where there is an outbreak. · Avoid contact with people who may be infected. · Avoid crowds and try to stay at least 6 feet away from other people. · Wash your hands often, especially after you cough or sneeze. Use soap and water, and scrub for at least 20 seconds. If soap and water aren't available, use an alcohol-based hand . · Avoid touching your mouth, nose, and eyes. To help avoid spreading the virus to others:  · Stay home if you are sick or have been exposed to the virus. Don't go to school, work, or public areas. And don't use public transportation, ride-shares, or taxis unless you have no choice. · Wear a cloth face cover if you have to go to public areas. · Cover your mouth with a tissue when you cough or sneeze. Then throw the tissue in the trash and wash your hands right away. · If you're sick:  ? Leave your home only if you need to get medical care. But call the doctor's office first so they know you're coming. And wear a face cover. ? Wear the face cover whenever you're around other people.  It can help stop the spread of the virus when you cough or sneeze. ? Limit contact with pets and people in your home. If possible, stay in a separate bedroom and use a separate bathroom. ? Clean and disinfect your home every day. Use household  and disinfectant wipes or sprays. Take special care to clean things that you grab with your hands. These include doorknobs, remote controls, phones, and handles on your refrigerator and microwave. And don't forget countertops, tabletops, bathrooms, and computer keyboards. When should you call for help? Call 911 anytime you think you may need emergency care. For example, call if you have life-threatening symptoms, such as:    · You have severe trouble breathing. (You can't talk at all.)     · You have constant chest pain or pressure.     · You are severely dizzy or lightheaded.     · You are confused or can't think clearly.     · Your face and lips have a blue color.     · You pass out (lose consciousness) or are very hard to wake up. Call your doctor now or seek immediate medical care if:    · You have moderate trouble breathing. (You can't speak a full sentence.)     · You are coughing up blood (more than about 1 teaspoon).     · You have signs of low blood pressure. These include feeling lightheaded; being too weak to stand; and having cold, pale, clammy skin. Watch closely for changes in your health, and be sure to contact your doctor if:    · Your symptoms get worse.     · You are not getting better as expected. Call before you go to the doctor's office. Follow their instructions. And wear a cloth face cover. Current as of: December 18, 2020               Content Version: 12.8  © 2006-2021 [employer], [employer]. Care instructions adapted under license by [facility] ([facility]). If you have questions about a medical condition or this instruction, always ask your healthcare professional. [place] [address] any warranty or liability for your use of this information.        Patient Education        Coronavirus ([id number]): Care Instructions  Overview  The coronavirus disease (COVID-19) is caused by a virus. Symptoms may include a fever, a cough, and shortness of breath. It mainly spreads person-to-person through droplets from coughing and sneezing. The virus also can spread when people are in close contact with someone who is infected. Most people have mild symptoms and can take care of themselves at home. If their symptoms get worse, they may need care in a hospital. Treatment may include medicines to reduce symptoms, plus breathing support such as oxygen therapy or a ventilator. It's important to not spread the virus to others. If you have COVID-19, wear a face cover anytime you are around other people. You need to isolate yourself while you are sick. Leave your home only if you need to get medical care or testing. Follow-up care is a key part of your treatment and safety. Be sure to make and go to all appointments, and call your doctor if you are having problems. It's also a good idea to know your test results and keep a list of the medicines you take. How can you care for yourself at home? · Get extra rest. It can help you feel better. · Drink plenty of fluids. This helps replace fluids lost from fever. Fluids also help ease a scratchy throat. Water, soup, fruit juice, and hot tea with lemon are good choices. · Take acetaminophen (such as Tylenol) to reduce a fever. It may also help with muscle aches. Read and follow all instructions on the label. · Use petroleum jelly on sore skin. This can help if the skin around your nose and lips becomes sore from rubbing a lot with tissues. Tips for self-isolation  · Limit contact with people in your home. If possible, stay in a separate bedroom and use a separate bathroom. · Wear a cloth face cover when you are around other people. It can help stop the spread of the virus when you cough or sneeze.   · If you have to leave home, avoid crowds and try to stay at least 6 feet away from other people. · Avoid contact with pets and other animals. · Cover your mouth and nose with a tissue when you cough or sneeze. Then throw it in the trash right away. · Wash your hands often, especially after you cough or sneeze. Use soap and water, and scrub for at least 20 seconds. If soap and water aren't available, use an alcohol-based hand . · Don't share personal household items. These include bedding, towels, cups and glasses, and eating utensils. · [address] in the warmest water allowed for the fabric type, and dry it completely. It's okay to wash other people's laundry with yours. · Clean and disinfect your home every day. Use household  and disinfectant wipes or sprays. Take special care to clean things that you grab with your hands. These include doorknobs, remote controls, phones, and handles on your refrigerator and microwave. And don't forget countertops, tabletops, bathrooms, and computer keyboards. When you can end self-isolation  · If you know or suspect that you have COVID-19, stay in self-isolation until:  ? You haven't had a fever for 24 hours while not taking medicines to lower the fever, and  ? Your symptoms have improved, and  ? It's been at least 10 days since your symptoms started. · Talk to your doctor about whether you also need testing, especially if you have a weakened immune system. When should you call for help? Call 911 anytime you think you may need emergency care. For example, call if you have life-threatening symptoms, such as:    · You have severe trouble breathing. (You can't talk at all.)     · You have constant chest pain or pressure.     · You are severely dizzy or lightheaded.     · You are confused or can't think clearly.     · Your face and lips have a blue color.     · You pass out (lose consciousness) or are very hard to wake up.    Call your doctor now or seek immediate medical care if:    · You have 3/17/2021     Order Specific Question:   Hospitalized for COVID-19? Answer:   No     Order Specific Question:   Admitted to ICU for COVID-19? Answer:   No     Order Specific Question:   Employed in healthcare setting? Answer:   No     Order Specific Question:   Resident in a congregate (group) care setting? Answer:   No     Order Specific Question:   Pregnant? Answer:   No     Order Specific Question:   Previously tested for COVID-19? Answer:   No     Outpatient Encounter Medications as of 3/19/2021   Medication Sig Dispense Refill    escitalopram (LEXAPRO) 5 MG tablet Take 1 tablet by mouth daily 30 tablet 2    Loratadine-Pseudoephedrine (CLARITIN-D 24 HOUR PO) Take 10 mg by mouth      acetaminophen (TYLENOL) 325 MG tablet Take 650 mg by mouth every 6 hours as needed for Pain       No facility-administered encounter medications on file as of 3/19/2021. Return if symptoms worsen or fail to improve.                 Electronically signed by JEFFERY Casas CNP on 3/19/2021 at 7:03 PM

## 2021-03-19 NOTE — PATIENT INSTRUCTIONS
Will notify you of covid test result as soon as available. You should isolate at home in an area away from family. If you must be around family members, please wear a mask. Quarantine at home until result is available. This means do not go to work/school, attend family gatherings, or invite others to your home until you know your test results. A work/school note will be provided for you. Symptoms appear viral; no antibiotic warranted at this time. The following are measures you can try at home to help provide symptom relief:    --Increase your water intake to help thin secretions and maintain hydration. Recommend 2-3 liters water/day. --May try warm salt water gargles, chloraseptic throat spray, or lozenges such as Cepacol sore throat lozenges, for throat pain. Cool beverages and popsicles can help as well. --May try mucinex (guaifenesin) to help with congestion and robitussin (dextromethorphan) for persistent cough. Use cool mist humidifier at bedtime to moisturize air. Use nasal saline rinse as needed for congestion. --Tylenol or ibuprofen for fever/body aches/headache. Warm/cold packs to forehead and back of neck can help with headache pain. Warm baths/showers can sooth body aches also. Practice good hand hygiene and cover your cough and sneeze to prevent passing virus on. If symptoms worsen or fail to improve, please return to clinic. If you develop severe worsening of symptoms such as chest pain or difficulty breathing, please go to ER. Patient Education        Learning About Coronavirus [phone]  What is coronavirus (COVID-19)? COVID-19 is a disease caused by a new type of coronavirus. This illness was first found in December 2019. It has since spread worldwide. Coronaviruses are a large group of viruses. They cause the common cold. They also cause more serious illnesses like [place] respiratory syndrome (MERS) and severe acute respiratory syndrome (SARS).  COVID-19 is caused by a novel coronavirus. That means it's a new type that has not been seen in people before. What are the symptoms? Coronavirus (COVID-19) symptoms may include:  · Fever. · Cough. · Trouble breathing. · Chills or repeated shaking with chills. · Muscle pain. · Headache. · Sore throat. · New loss of taste or smell. · Vomiting. · Diarrhea. In severe cases, COVID-19 can cause pneumonia and make it hard to breathe without help from a machine. It can cause death. How is it diagnosed? COVID-19 is diagnosed with a viral test. This may also be called a PCR test or antigen test. It looks for evidence of the virus in your breathing passages or lungs (respiratory system). The test is most often done on a sample from the nose, throat, or lungs. It's sometimes done on a sample of saliva. One way a sample is collected is by putting a long swab into the back of your nose. How is it treated? Mild cases of COVID-19 can be treated at home. Serious cases need treatment in the hospital. Treatment may include medicines to reduce symptoms, plus breathing support such as oxygen therapy or a ventilator. Some people may be placed on their belly to help their oxygen levels. Treatments that may help people who have COVID-19 include:  Antiviral medicines. These medicines treat viral infections. Remdesivir is an example. Immune-based therapy. These medicines help the immune system fight COVID-19. One example is bamlanivimab. It's a monoclonal antibody. Blood thinners. These medicines help prevent blood clots. People with severe illness are at risk for blood clots. How can you protect yourself and others? The best way to protect yourself from getting sick is to:  · Avoid areas where there is an outbreak. · Avoid contact with people who may be infected. · Avoid crowds and try to stay at least 6 feet away from other people. · Wash your hands often, especially after you cough or sneeze.  Use soap and water, and scrub for at least 20 seconds. If soap and water aren't available, use an alcohol-based hand . · Avoid touching your mouth, nose, and eyes. To help avoid spreading the virus to others:  · Stay home if you are sick or have been exposed to the virus. Don't go to school, work, or public areas. And don't use public transportation, ride-shares, or taxis unless you have no choice. · Wear a cloth face cover if you have to go to public areas. · Cover your mouth with a tissue when you cough or sneeze. Then throw the tissue in the trash and wash your hands right away. · If you're sick:  ? Leave your home only if you need to get medical care. But call the doctor's office first so they know you're coming. And wear a face cover. ? Wear the face cover whenever you're around other people. It can help stop the spread of the virus when you cough or sneeze. ? Limit contact with pets and people in your home. If possible, stay in a separate bedroom and use a separate bathroom. ? Clean and disinfect your home every day. Use household  and disinfectant wipes or sprays. Take special care to clean things that you grab with your hands. These include doorknobs, remote controls, phones, and handles on your refrigerator and microwave. And don't forget countertops, tabletops, bathrooms, and computer keyboards. When should you call for help? Call 911 anytime you think you may need emergency care. For example, call if you have life-threatening symptoms, such as:    · You have severe trouble breathing. (You can't talk at all.)     · You have constant chest pain or pressure.     · You are severely dizzy or lightheaded.     · You are confused or can't think clearly.     · Your face and lips have a blue color.     · You pass out (lose consciousness) or are very hard to wake up. Call your doctor now or seek immediate medical care if:    · You have moderate trouble breathing.  (You can't speak a full sentence.)     · You are coughing up blood (more than about 1 teaspoon).     · You have signs of low blood pressure. These include feeling lightheaded; being too weak to stand; and having cold, pale, clammy skin. Watch closely for changes in your health, and be sure to contact your doctor if:    · Your symptoms get worse.     · You are not getting better as expected. Call before you go to the doctor's office. Follow their instructions. And wear a cloth face cover. Current as of: December 18, 2020               Content Version: 12.8  © 2006-2021 [employer]. Care instructions adapted under license by [facility] ([facility]). If you have questions about a medical condition or this instruction, always ask your healthcare professional. [address] any warranty or liability for your use of this information. Patient Education        Coronavirus (BOLHB-28): Care Instructions  Overview  The coronavirus disease (COVID-19) is caused by a virus. Symptoms may include a fever, a cough, and shortness of breath. It mainly spreads person-to-person through droplets from coughing and sneezing. The virus also can spread when people are in close contact with someone who is infected. Most people have mild symptoms and can take care of themselves at home. If their symptoms get worse, they may need care in a hospital. Treatment may include medicines to reduce symptoms, plus breathing support such as oxygen therapy or a ventilator. It's important to not spread the virus to others. If you have COVID-19, wear a face cover anytime you are around other people. You need to isolate yourself while you are sick. Leave your home only if you need to get medical care or testing. Follow-up care is a key part of your treatment and safety. Be sure to make and go to all appointments, and call your doctor if you are having problems. It's also a good idea to know your test results and keep a list of the medicines you take.   How can you care for yourself at home? · Get extra rest. It can help you feel better. · Drink plenty of fluids. This helps replace fluids lost from fever. Fluids also help ease a scratchy throat. Water, soup, fruit juice, and hot tea with lemon are good choices. · Take acetaminophen (such as Tylenol) to reduce a fever. It may also help with muscle aches. Read and follow all instructions on the label. · Use petroleum jelly on sore skin. This can help if the skin around your nose and lips becomes sore from rubbing a lot with tissues. Tips for self-isolation  · Limit contact with people in your home. If possible, stay in a separate bedroom and use a separate bathroom. · Wear a cloth face cover when you are around other people. It can help stop the spread of the virus when you cough or sneeze. · If you have to leave home, avoid crowds and try to stay at least 6 feet away from other people. · Avoid contact with pets and other animals. · Cover your mouth and nose with a tissue when you cough or sneeze. Then throw it in the trash right away. · Wash your hands often, especially after you cough or sneeze. Use soap and water, and scrub for at least 20 seconds. If soap and water aren't available, use an alcohol-based hand . · Don't share personal household items. These include bedding, towels, cups and glasses, and eating utensils. · [address] in the warmest water allowed for the fabric type, and dry it completely. It's okay to wash other people's laundry with yours. · Clean and disinfect your home every day. Use household  and disinfectant wipes or sprays. Take special care to clean things that you grab with your hands. These include doorknobs, remote controls, phones, and handles on your refrigerator and microwave. And don't forget countertops, tabletops, bathrooms, and computer keyboards.   When you can end self-isolation  · If you know or suspect that you have COVID-19, stay in self-isolation until: ? You haven't had a fever for 24 hours while not taking medicines to lower the fever, and  ? Your symptoms have improved, and  ? It's been at least 10 days since your symptoms started. · Talk to your doctor about whether you also need testing, especially if you have a weakened immune system. When should you call for help? Call 911 anytime you think you may need emergency care. For example, call if you have life-threatening symptoms, such as:    · You have severe trouble breathing. (You can't talk at all.)     · You have constant chest pain or pressure.     · You are severely dizzy or lightheaded.     · You are confused or can't think clearly.     · Your face and lips have a blue color.     · You pass out (lose consciousness) or are very hard to wake up. Call your doctor now or seek immediate medical care if:    · You have moderate trouble breathing. (You can't speak a full sentence.)     · You are coughing up blood (more than about 1 teaspoon).     · You have signs of low blood pressure. These include feeling lightheaded; being too weak to stand; and having cold, pale, clammy skin. Watch closely for changes in your health, and be sure to contact your doctor if:    · Your symptoms get worse.     · You are not getting better as expected. Call before you go to the doctor's office. Follow their instructions. And wear a cloth face cover. Current as of: December 18, 2020               Content Version: 12.8  © 8416-5364 [employer]. Care instructions adapted under license by [facility] ([facility]). If you have questions about a medical condition or this instruction, always ask your healthcare professional. [address] any warranty or liability for your use of this information.

## 2021-03-19 NOTE — LETTER
[address] [facility]  [address] [facility] [address]  Phone: [phone]  Fax: [phone]    JEFFERY Virgen CNP        March 19, 2021     Patient: Fred Valentin   YOB: 1995   Date of Visit: 3/19/2021       To Whom it May Concern:    Jose Kendall was seen in my clinic on 3/19/2021. She may work to school after a negative covid test result (results expected in appx 1-3 days) and marked symptom improvement. Pt should be fever free for 24 hours without medication. If you have any questions or concerns, please don't hesitate to call.     Sincerely,         JEFFERY Virgen CNP

## 2021-03-20 ENCOUNTER — HOSPITAL ENCOUNTER (EMERGENCY)
Age: 26
Discharge: HOME | End: 2021-03-20
Attending: EMERGENCY MEDICINE
Payer: COMMERCIAL

## 2021-03-20 ENCOUNTER — APPOINTMENT (OUTPATIENT)
Dept: GENERAL RADIOLOGY | Age: 26
End: 2021-03-20
Payer: COMMERCIAL

## 2021-03-20 VITALS
DIASTOLIC BLOOD PRESSURE: 69 MMHG | BODY MASS INDEX: 33.1 KG/M2 | RESPIRATION RATE: 13 BRPM | HEART RATE: 70 BPM | HEIGHT: 63 IN | OXYGEN SATURATION: 98 % | WEIGHT: 186.8 LBS | TEMPERATURE: 97.9 F | SYSTOLIC BLOOD PRESSURE: 113 MMHG

## 2021-03-20 DIAGNOSIS — R06.89 DYSPNEA AND RESPIRATORY ABNORMALITIES: Primary | ICD-10-CM

## 2021-03-20 DIAGNOSIS — R06.00 DYSPNEA AND RESPIRATORY ABNORMALITIES: Primary | ICD-10-CM

## 2021-03-20 LAB
ABSOLUTE EOS #: <0.03 K/UL (ref 0–0.44)
ABSOLUTE IMMATURE GRANULOCYTE: <0.03 K/UL (ref 0–0.3)
ABSOLUTE LYMPH #: 1.96 K/UL (ref 1.1–3.7)
ABSOLUTE MONO #: 0.52 K/UL (ref 0.1–1.2)
ALBUMIN SERPL-MCNC: 4.4 G/DL (ref 3.5–5.2)
ALBUMIN/GLOBULIN RATIO: 1.4 (ref 1–2.5)
ALP BLD-CCNC: 79 U/L (ref 35–104)
ALT SERPL-CCNC: 15 U/L (ref 5–33)
ANION GAP SERPL CALCULATED.3IONS-SCNC: 14 MMOL/L (ref 9–17)
AST SERPL-CCNC: 15 U/L
BASOPHILS # BLD: 1 % (ref 0–2)
BASOPHILS ABSOLUTE: 0.05 K/UL (ref 0–0.2)
BILIRUB SERPL-MCNC: 0.6 MG/DL (ref 0.3–1.2)
BUN BLDV-MCNC: 13 MG/DL (ref 6–20)
BUN/CREAT BLD: 20 (ref 9–20)
CALCIUM SERPL-MCNC: 9.5 MG/DL (ref 8.6–10.4)
CHLORIDE BLD-SCNC: 98 MMOL/L (ref 98–107)
CO2: 22 MMOL/L (ref 20–31)
CREAT SERPL-MCNC: 0.65 MG/DL (ref 0.5–0.9)
D-DIMER QUANTITATIVE: <0.27 MG/L FEU (ref 0–0.59)
DIFFERENTIAL TYPE: ABNORMAL
EOSINOPHILS RELATIVE PERCENT: 0 % (ref 1–4)
GFR AFRICAN AMERICAN: >60 ML/MIN
GFR NON-AFRICAN AMERICAN: >60 ML/MIN
GFR SERPL CREATININE-BSD FRML MDRD: ABNORMAL ML/MIN/{1.73_M2}
GFR SERPL CREATININE-BSD FRML MDRD: ABNORMAL ML/MIN/{1.73_M2}
GLUCOSE BLD-MCNC: 91 MG/DL (ref 70–99)
HCG QUALITATIVE: NEGATIVE
HCT VFR BLD CALC: 41.1 % (ref 36.3–47.1)
HEMOGLOBIN: 13.7 G/DL (ref 11.9–15.1)
IMMATURE GRANULOCYTES: 0 %
LYMPHOCYTES # BLD: 30 % (ref 24–43)
MCH RBC QN AUTO: 29.8 PG (ref 25.2–33.5)
MCHC RBC AUTO-ENTMCNC: 33.3 G/DL (ref 25.2–33.5)
MCV RBC AUTO: 89.5 FL (ref 82.6–102.9)
MONOCYTES # BLD: 8 % (ref 3–12)
MONONUCLEOSIS SCREEN: NEGATIVE
NRBC AUTOMATED: 0 PER 100 WBC
PDW BLD-RTO: 11.9 % (ref 11.8–14.4)
PLATELET # BLD: 288 K/UL (ref 138–453)
PLATELET ESTIMATE: ABNORMAL
PMV BLD AUTO: 8.8 FL (ref 8.1–13.5)
POTASSIUM SERPL-SCNC: 3.7 MMOL/L (ref 3.7–5.3)
RBC # BLD: 4.59 M/UL (ref 3.95–5.11)
RBC # BLD: ABNORMAL 10*6/UL
SEG NEUTROPHILS: 61 % (ref 36–65)
SEGMENTED NEUTROPHILS ABSOLUTE COUNT: 3.92 K/UL (ref 1.5–8.1)
SODIUM BLD-SCNC: 134 MMOL/L (ref 135–144)
TOTAL PROTEIN: 7.5 G/DL (ref 6.4–8.3)
TROPONIN INTERP: NORMAL
TROPONIN INTERP: NORMAL
TROPONIN T: NORMAL NG/ML
TROPONIN T: NORMAL NG/ML
TROPONIN, HIGH SENSITIVITY: <6 NG/L (ref 0–14)
TROPONIN, HIGH SENSITIVITY: <6 NG/L (ref 0–14)
WBC # BLD: 6.5 K/UL (ref 3.5–11.3)
WBC # BLD: ABNORMAL 10*3/UL

## 2021-03-20 PROCEDURE — 85379 FIBRIN DEGRADATION QUANT: CPT

## 2021-03-20 PROCEDURE — 84484 ASSAY OF TROPONIN QUANT: CPT

## 2021-03-20 PROCEDURE — 85025 COMPLETE CBC W/AUTO DIFF WBC: CPT

## 2021-03-20 PROCEDURE — 84703 CHORIONIC GONADOTROPIN ASSAY: CPT

## 2021-03-20 PROCEDURE — 80053 COMPREHEN METABOLIC PANEL: CPT

## 2021-03-20 PROCEDURE — 86308 HETEROPHILE ANTIBODY SCREEN: CPT

## 2021-03-20 PROCEDURE — 99284 EMERGENCY DEPT VISIT MOD MDM: CPT

## 2021-03-20 PROCEDURE — 36415 COLL VENOUS BLD VENIPUNCTURE: CPT

## 2021-03-20 PROCEDURE — 71045 X-RAY EXAM CHEST 1 VIEW: CPT

## 2021-03-20 PROCEDURE — 93005 ELECTROCARDIOGRAM TRACING: CPT | Performed by: EMERGENCY MEDICINE

## 2021-03-20 ASSESSMENT — ENCOUNTER SYMPTOMS
VOMITING: 0
NAUSEA: 1
CONSTIPATION: 0
BLOOD IN STOOL: 0
BACK PAIN: 0
COUGH: 0
SHORTNESS OF BREATH: 1
DIARRHEA: 0
ABDOMINAL PAIN: 0
EYE PAIN: 0

## 2021-03-20 ASSESSMENT — PAIN SCALES - GENERAL: PAINLEVEL_OUTOF10: 5

## 2021-03-20 ASSESSMENT — PAIN DESCRIPTION - LOCATION: LOCATION: ABDOMEN

## 2021-03-20 NOTE — ED PROVIDER NOTES
[facility]  eMERGENCY dEPARTMENT eNCOUnter      Pt Name: Cecilia Arguelles  MRN: [id number]  [place] 1995  Date of evaluation: 3/20/2021      CHIEF COMPLAINT       Chief Complaint   Patient presents with    Shortness of Breath     Since today. Fatigue and nausea on and off x 6 days.  Fatigue    Nausea    Anxiety         HISTORY OF PRESENT ILLNESS    Cecilia Arguelles is a 30 y.o. female who presents chief complaint of shortness of breath, she said that last couple days has been more fatigued than usual she had a sore throat she was seen here a possible panic attack she had a strep screen at that time which was negative I also checked her for Covid which is not back yet she has had some chills maybe a fever she said her sense of smell is there but she smell some things as well. There is been no diarrhea associated with this her and anxiety is increased she does have some nausea as well but no vomiting no chest discomfort other than she gets a pulsating feeling in her chest  Patient does have remote history of DVT she is never had a PE she also has a history of psoriasis    REVIEW OF SYSTEMS         Review of Systems   Constitutional: Positive for chills and fatigue. Negative for fever. HENT: Negative for congestion and ear pain. Eyes: Negative for pain and visual disturbance. Respiratory: Positive for shortness of breath. Negative for cough. Cardiovascular: Positive for chest pain. Negative for palpitations and leg swelling. Gastrointestinal: Positive for nausea. Negative for abdominal pain, blood in stool, constipation, diarrhea and vomiting. Endocrine: Negative for polydipsia and polyuria. Genitourinary: Negative for difficulty urinating, dysuria, frequency, vaginal bleeding and vaginal discharge. Musculoskeletal: Negative for back pain, joint swelling, myalgias, neck pain and neck stiffness. Skin: Negative for rash.         Patient has a rash   Neurological: Negative for dizziness, weakness and headaches. Hematological: Negative for adenopathy. Does not bruise/bleed easily. Psychiatric/Behavioral: Negative for confusion, self-injury and suicidal ideas. The patient is nervous/anxious. PAST MEDICAL HISTORY    has a past medical history of Acne, Anisocoria, Chickenpox, DVT (deep venous thrombosis) ([address] H/O deep venous thrombosis, Obesity, Psoriasis, Seasonal allergies, Social anxiety disorder, Swollen tonsil, and Vitamin D deficiency. SURGICAL HISTORY      has a past surgical history that includes [place] tooth extraction; Endoscopic ultrasonography, GI; Colonoscopy (2010); Upper gastrointestinal endoscopy (2010); and ovarian cyst removal (Right, 09/27/2016). CURRENT MEDICATIONS       Previous Medications    ACETAMINOPHEN (TYLENOL) 325 MG TABLET    Take 650 mg by mouth every 6 hours as needed for Pain    ESCITALOPRAM (LEXAPRO) 5 MG TABLET    Take 1 tablet by mouth daily    LORATADINE-PSEUDOEPHEDRINE (CLARITIN-D 24 HOUR PO)    Take 10 mg by mouth       ALLERGIES     is allergic to effexor xr [venlafaxine hcl]; other; and ibuprofen. FAMILY HISTORY     She indicated that her mother is alive. She indicated that her father is alive. She indicated that her maternal grandmother is alive. She indicated that her maternal grandfather is alive. She indicated that her paternal grandmother is alive. She indicated that her paternal grandfather is alive. She indicated that her maternal uncle is alive. She indicated that the status of her other is unknown. She indicated that the status of her neg hx is unknown.     family history includes Deep Vein Thrombosis in her father and mother; Depression in her father and mother; Diabetes in her paternal grandfather; Other in her father and mother; Thyroid Disease in her maternal grandmother, maternal uncle, and another family member. SOCIAL HISTORY      reports that she has never smoked.  She has never used smokeless tobacco. She reports current alcohol use. She reports that she does not use drugs. PHYSICAL EXAM     INITIAL VITALS:  height is 5' 3\" (1.6 m) and weight is 186 lb 12.8 oz (84.7 kg). Her tympanic temperature is 97.9 °F (36.6 °C). Her blood pressure is 134/82 and her pulse is 77. Her respiration is 14 and oxygen saturation is 98%. Physical Exam  Constitutional:       Appearance: She is well-developed. She is not ill-appearing or diaphoretic. HENT:      Head: Normocephalic and atraumatic. Right Ear: External ear normal.      Left Ear: External ear normal.   Eyes:      Extraocular Movements: Extraocular movements intact. Conjunctiva/sclera: Conjunctivae normal.      Pupils: Pupils are equal, round, and reactive to light. Neck:      Musculoskeletal: Normal range of motion. Cardiovascular:      Rate and Rhythm: Normal rate and regular rhythm. Pulmonary:      Effort: Pulmonary effort is normal.      Breath sounds: Normal breath sounds. No decreased breath sounds, wheezing, rhonchi or rales. Abdominal:      General: Bowel sounds are normal.      Palpations: Abdomen is soft. Musculoskeletal:         General: No tenderness. Right lower leg: She exhibits no tenderness. No edema. Left lower leg: She exhibits no tenderness. No edema. Skin:     General: Skin is warm and dry. Capillary Refill: Capillary refill takes less than 2 seconds. Neurological:      General: No focal deficit present. Mental Status: She is alert and oriented to person, place, and time.    Psychiatric:         Behavior: Behavior normal.           DIFFERENTIAL DIAGNOSIS/ MDM:     Possible viral syndrome anxiety will do a work-up for cardiac as well as D-dimer to rule out PE    DIAGNOSTIC RESULTS     EKG: All EKG's are interpreted by the Emergency Department Physician who either signs or Co-signs this chart in the absence of a cardiologist.  Normal sinus rhythm with sinus arrhythmia rate of 73 bpm ND interval is 156 ms QRS durations 90 ms QT corrected 442 ms axis is 59 no acute ST or T wave changes otherwise normal EKG      RADIOLOGY:   I directly visualized the following  images and reviewed the radiologist interpretations:          ED BEDSIDE ULTRASOUND:       LABS:  Labs Reviewed   CBC WITH AUTO DIFFERENTIAL   COMPREHENSIVE METABOLIC PANEL   HCG, SERUM, QUALITATIVE   TROPONIN   D-DIMER, QUANTITATIVE   MONONUCLEOSIS SCREEN           EMERGENCY DEPARTMENT COURSE:   Vitals:    Vitals:    03/20/21 1804   BP: 134/82   Pulse: 77   Resp: 14   Temp: 97.9 °F (36.6 °C)   TempSrc: Tympanic   SpO2: 98%   Weight: 186 lb 12.8 oz (84.7 kg)   Height: 5' 3\" (1.6 m)     -------------------------  BP: 134/82, Temp: 97.9 °F (36.6 °C), Pulse: 77, Resp: 14        Re-evaluation Notes        CRITICAL CARE:   None        CONSULTS:      PROCEDURES:  None    FINAL IMPRESSION    No diagnosis found. DISPOSITION/PLAN   DISPOSITION care of this patient is passed to the oncoming physician at the end of my shift 1930 hrs. pending results    Condition on Disposition    Stable    PATIENT REFERRED TO:  No follow-up provider specified. DISCHARGE MEDICATIONS:  New Prescriptions    No medications on file       (Please note that portions of this note were completed with a voice recognition program.  Efforts were made to edit the dictations but occasionally words are mis-transcribed.)    Cerna MD, F.A.A.E.M.   Attending Emergency Physician                          Luba Kuo MD  03/21/21 [id number]

## 2021-03-20 NOTE — ED NOTES
Pt became tearful and nauseated after IV placement. Pt states \"I'm sorry. .i'm just really worried. .. I need to work through this. \"     Bart Pastor RN  03/20/21 4811

## 2021-03-21 LAB
EKG ATRIAL RATE: 67 BPM
EKG ATRIAL RATE: 73 BPM
EKG P AXIS: 49 DEGREES
EKG P AXIS: 56 DEGREES
EKG P-R INTERVAL: 156 MS
EKG P-R INTERVAL: 158 MS
EKG Q-T INTERVAL: 402 MS
EKG Q-T INTERVAL: 418 MS
EKG QRS DURATION: 90 MS
EKG QRS DURATION: 98 MS
EKG QTC CALCULATION (BAZETT): 441 MS
EKG QTC CALCULATION (BAZETT): 442 MS
EKG R AXIS: 59 DEGREES
EKG R AXIS: 69 DEGREES
EKG T AXIS: 26 DEGREES
EKG T AXIS: 48 DEGREES
EKG VENTRICULAR RATE: 67 BPM
EKG VENTRICULAR RATE: 73 BPM
SARS-COV-2: NORMAL
SARS-COV-2: NOT DETECTED
SOURCE: NORMAL

## 2021-03-21 NOTE — ED PROVIDER NOTES
[facility] ED  [address]  Phone: [phone]        ADDENDUM:      Care of this patient was assumed from Dr. Moustapha Umaña. The patient was seen for Shortness of Breath (Since today. Fatigue and nausea on and off x 6 days.), Fatigue, Nausea, and Anxiety  . The patient's initial evaluation and plan have been discussed with the prior provider who initially evaluated the patient. Nursing Notes, Past Medical Hx, Past Surgical Hx, Allergies, were all reviewed. PAST MEDICAL HISTORY    has a past medical history of Acne, Anisocoria, Chickenpox, DVT (deep venous thrombosis) ([address] [address] H/O deep venous thrombosis, Obesity, Psoriasis, Seasonal allergies, Social anxiety disorder, Swollen tonsil, and Vitamin D deficiency. SURGICAL HISTORY      has a past surgical history that includes [place] tooth extraction; Endoscopic ultrasonography, GI; Colonoscopy (2010); Upper gastrointestinal endoscopy (2010); and ovarian cyst removal (Right, 09/27/2016). CURRENT MEDICATIONS       Previous Medications    ACETAMINOPHEN (TYLENOL) 325 MG TABLET    Take 650 mg by mouth every 6 hours as needed for Pain    ESCITALOPRAM (LEXAPRO) 5 MG TABLET    Take 1 tablet by mouth daily    LORATADINE-PSEUDOEPHEDRINE (CLARITIN-D 24 HOUR PO)    Take 10 mg by mouth       ALLERGIES     is allergic to effexor xr [venlafaxine hcl]; other; and ibuprofen. Diagnostic Results     Twelve lead EKG interpreted by myself:  A 12 lead EKG done at  2019, interpreted by myself, showed a regular rhythm at a rate of 67bpm.  The [place] interval was normal.  The QRS complex was normal.  There was no ST segment elevation or depression, T wave inversion not present.   QRS progression through precordial leads was grossly normal.  Interpretation: Normal sinus rhythm, no ST segment changes and no pattern consistent with acute ischemia or infarct    LABS:   Results for orders placed or performed during the hospital encounter of 03/20/21   CBC Auto Differential   Result Value Ref Range    WBC 6.5 3.5 - 11.3 k/uL    RBC 4.59 3.95 - 5.11 m/uL    Hemoglobin 13.7 11.9 - 15.1 g/dL    Hematocrit 41.1 36.3 - 47.1 %    MCV 89.5 82.6 - 102.9 fL    MCH 29.8 25.2 - 33.5 pg    MCHC 33.3 25.2 - 33.5 g/dL    RDW 11.9 11.8 - 14.4 %    Platelets 009 135 - 581 k/uL    MPV 8.8 8.1 - 13.5 fL    NRBC Automated 0.0 0.0 per 100 WBC    Differential Type NOT REPORTED     Seg Neutrophils 61 36 - 65 %    Lymphocytes 30 24 - 43 %    Monocytes 8 3 - 12 %    Eosinophils % 0 (L) 1 - 4 %    Basophils 1 0 - 2 %    Immature Granulocytes 0 0 %    Segs Absolute 3.92 1.50 - 8.10 k/uL    Absolute Lymph # 1.96 1.10 - 3.70 k/uL    Absolute Mono # 0.52 0.10 - 1.20 k/uL    Absolute Eos # <0.03 0.00 - 0.44 k/uL    Basophils Absolute 0.05 0.00 - 0.20 k/uL    Absolute Immature Granulocyte <0.03 0.00 - 0.30 k/uL    WBC Morphology NOT REPORTED     RBC Morphology NOT REPORTED     Platelet Estimate NOT REPORTED    Comprehensive Metabolic Panel   Result Value Ref Range    Glucose 91 70 - 99 mg/dL    BUN 13 6 - 20 mg/dL    CREATININE 0.65 0.50 - 0.90 mg/dL    Bun/Cre Ratio 20 9 - 20    Calcium 9.5 8.6 - 10.4 mg/dL    Sodium 134 (L) 135 - 144 mmol/L    Potassium 3.7 3.7 - 5.3 mmol/L    Chloride 98 98 - 107 mmol/L    CO2 22 20 - 31 mmol/L    Anion Gap 14 9 - 17 mmol/L    Alkaline Phosphatase 79 35 - 104 U/L    ALT 15 5 - 33 U/L    AST 15 <32 U/L    Total Bilirubin 0.60 0.3 - 1.2 mg/dL    Total Protein 7.5 6.4 - 8.3 g/dL    Albumin 4.4 3.5 - 5.2 g/dL    Albumin/Globulin Ratio 1.4 1.0 - 2.5    GFR Non-African American >60 >60 mL/min    GFR African American >60 >60 mL/min    GFR Comment          GFR Staging NOT REPORTED    HCG Qualitative, Serum   Result Value Ref Range    hCG Qual NEGATIVE NEGATIVE   Troponin   Result Value Ref Range    Troponin, High Sensitivity <6 0 - 14 ng/L    Troponin T NOT REPORTED <0.03 ng/mL    Troponin Interp NOT REPORTED    D-Dimer, Quantitative   Result Value Ref Range    D-Dimer, Quant <0.27 0.00 - 0.59 mg/L FEU   Mononucleosis Screen   Result Value Ref Range    Mononucleosis Screen NEGATIVE NEGATIVE   Troponin   Result Value Ref Range    Troponin, High Sensitivity <6 0 - 14 ng/L    Troponin T NOT REPORTED <0.03 ng/mL    Troponin Interp NOT REPORTED    EKG 12 Lead   Result Value Ref Range    Ventricular Rate 73 BPM    Atrial Rate 73 BPM    P-R Interval 156 ms    QRS Duration 90 ms    Q-T Interval 402 ms    QTc Calculation (Bazett) 442 ms    P Axis 49 degrees    R Axis 59 degrees    T Axis 26 degrees   EKG 12 Lead   Result Value Ref Range    Ventricular Rate 67 BPM    Atrial Rate 67 BPM    P-R Interval 158 ms    QRS Duration 98 ms    Q-T Interval 418 ms    QTc Calculation (Bazett) 441 ms    P Axis 56 degrees    R Axis 69 degrees    T Axis 48 degrees       RADIOLOGY:  XR CHEST PORTABLE   Final Result   No acute process. RECENT VITALS:  BP: 113/69, Temp: 97.9 °F (36.6 °C), Pulse: 70, Resp: 12     ED Course     The patient was given the following medications:  No orders of the defined types were placed in this encounter. Medical Decision Making           Patient was signed out to me pending repeat troponin. Repeat troponin was negative. Vitals are normal.  She is resting comfortably. At this time, I have discharged her after discussing shortness of breath, the importance of follow-up and what to return to ER for    At this time the patient is without objective evidence of an acute process requiring hospitalization or inpatient management. They have remained hemodynamically stable and are stable for discharge with outpatient follow-up. Standard anticipatory guidance given to patient upon discharge. Have given them a specific time frame in which to follow-up and who to follow-up with. I have also advised them that they should return to the emergency department if they get worse, or not getting better or develop any new or concerning symptoms.   Patient demonstrates understanding. EMERGENCY DEPARTMENT COURSE:   Vitals:    Vitals:    03/20/21 1804 03/20/21 1929 03/20/21 2041   BP: 134/82  113/69   Pulse: 77 77 70   Resp: 14 13 12   Temp: 97.9 °F (36.6 °C)     TempSrc: Tympanic     SpO2: 98% 100% 98%   Weight: 186 lb 12.8 oz (84.7 kg)     Height: 5' 3\" (1.6 m)       -------------------------  BP: 113/69, Temp: 97.9 °F (36.6 °C), Pulse: 70, Resp: 12      RE-EVALUATION:  Improved and stable    CONSULTS:  None    PROCEDURES:  None    FINAL IMPRESSION      1. Dyspnea and respiratory abnormalities          DISPOSITION/PLAN   DISPOSITION Decision To Discharge 03/20/2021 09:16:46 PM      CONDITION ON DISPOSITION:   Stable    PATIENT REFERRED TO:  [facility] [address] [phone]    In 3 days        DISCHARGE MEDICATIONS:  New Prescriptions    No medications on file       (Please note that portions of this note were completed with a voicerecognition program.  Efforts were made to edit the dictations but occasionally words are mis-transcribed.)    Batres MD, F.A.C.E.P.   Attending Emergency Medicine Physician                     Merced Harrsi,   03/20/21 7770

## 2021-03-22 NOTE — TELEPHONE ENCOUNTER
From: Juma Mendoza  To: Dari Tapia PSYD  Sent: 3/12/2021 12:58 PM EST  Subject: Visit Follow-Up Question    Good afternoon,    I was wondering if I could drop off my assessments to you at some point? Thank you,  Rebekah DUFFY

## 2021-03-25 ENCOUNTER — TELEMEDICINE (OUTPATIENT)
Dept: BEHAVIORAL/MENTAL HEALTH | Age: 26
End: 2021-03-25
Payer: COMMERCIAL

## 2021-03-25 ENCOUNTER — VIRTUAL VISIT (OUTPATIENT)
Dept: FAMILY MEDICINE CLINIC | Age: 26
End: 2021-03-25
Payer: COMMERCIAL

## 2021-03-25 ENCOUNTER — HOSPITAL ENCOUNTER (EMERGENCY)
Age: 26
Discharge: HOME | End: 2021-03-25
Attending: EMERGENCY MEDICINE
Payer: COMMERCIAL

## 2021-03-25 VITALS
BODY MASS INDEX: 32.07 KG/M2 | HEIGHT: 63 IN | RESPIRATION RATE: 16 BRPM | SYSTOLIC BLOOD PRESSURE: 126 MMHG | WEIGHT: 181 LBS | DIASTOLIC BLOOD PRESSURE: 82 MMHG | HEART RATE: 68 BPM | OXYGEN SATURATION: 96 % | TEMPERATURE: 98.1 F

## 2021-03-25 DIAGNOSIS — F40.10 SOCIAL ANXIETY DISORDER: ICD-10-CM

## 2021-03-25 DIAGNOSIS — M79.602 LEFT ARM PAIN: Primary | ICD-10-CM

## 2021-03-25 DIAGNOSIS — F41.9 ANXIETY: Primary | ICD-10-CM

## 2021-03-25 DIAGNOSIS — F41.9 ANXIETY DISORDER, UNSPECIFIED TYPE: Primary | ICD-10-CM

## 2021-03-25 DIAGNOSIS — F41.0 PANIC ATTACKS: ICD-10-CM

## 2021-03-25 DIAGNOSIS — R41.844 EXECUTIVE FUNCTION DEFICIT: ICD-10-CM

## 2021-03-25 PROCEDURE — 99213 OFFICE O/P EST LOW 20 MIN: CPT | Performed by: FAMILY MEDICINE

## 2021-03-25 PROCEDURE — 90837 PSYTX W PT 60 MINUTES: CPT | Performed by: COUNSELOR

## 2021-03-25 PROCEDURE — 99284 EMERGENCY DEPT VISIT MOD MDM: CPT

## 2021-03-25 RX ORDER — M-VIT,TX,IRON,MINS/CALC/FOLIC 27MG-0.4MG
1 TABLET ORAL DAILY
COMMUNITY

## 2021-03-25 RX ORDER — ERGOCALCIFEROL 1.25 MG/1
CAPSULE ORAL
COMMUNITY
Start: 2021-03-13 | End: 2021-03-25

## 2021-03-25 RX ORDER — ERGOCALCIFEROL 1.25 MG/1
CAPSULE ORAL
COMMUNITY
End: 2021-03-25

## 2021-03-25 RX ORDER — BUPROPION HYDROCHLORIDE 150 MG/1
150 TABLET ORAL EVERY MORNING
Qty: 30 TABLET | Refills: 3 | Status: SHIPPED | OUTPATIENT
Start: 2021-03-25 | End: 2021-10-06

## 2021-03-25 RX ORDER — AMOXICILLIN 875 MG/1
875 TABLET, COATED ORAL 2 TIMES DAILY
Qty: 20 TABLET | Refills: 0 | Status: SHIPPED | OUTPATIENT
Start: 2021-03-25 | End: 2021-04-01

## 2021-03-25 RX ORDER — BUSPIRONE HYDROCHLORIDE 10 MG/1
10 TABLET ORAL 3 TIMES DAILY
Qty: 90 TABLET | Refills: 0 | Status: SHIPPED | OUTPATIENT
Start: 2021-03-25 | End: 2021-04-24

## 2021-03-25 ASSESSMENT — ENCOUNTER SYMPTOMS
GASTROINTESTINAL NEGATIVE: 1
SORE THROAT: 1
EYES NEGATIVE: 1
RESPIRATORY NEGATIVE: 1
ALLERGIC/IMMUNOLOGIC NEGATIVE: 1

## 2021-03-25 ASSESSMENT — PAIN DESCRIPTION - ORIENTATION: ORIENTATION: LEFT

## 2021-03-25 ASSESSMENT — PATIENT HEALTH QUESTIONNAIRE - PHQ9
1. LITTLE INTEREST OR PLEASURE IN DOING THINGS: 0
SUM OF ALL RESPONSES TO PHQ QUESTIONS 1-9: 1
2. FEELING DOWN, DEPRESSED OR HOPELESS: 1
SUM OF ALL RESPONSES TO PHQ QUESTIONS 1-9: 1

## 2021-03-25 ASSESSMENT — PAIN DESCRIPTION - DESCRIPTORS: DESCRIPTORS: BURNING

## 2021-03-25 NOTE — PROGRESS NOTES
This note will not be viewable in Synchronized for the following reason(s). This is a Psychotherapy Note. TELEHEALTH Consultation/Psychotherapy-- Audio/Visual   (During GCBQK-87 public health emergency)  Wilman Nguyen. Anant Medina Psy.D. Visit Date:  3/25/2021    Patient:  Komal Barth  YOB: 1995  Chief Complaint:  Follow-up    Duration of session:  55 minutes    Patient Location:   Patient home [place], [place])    Provider Location:   [place], [place]    This virtual visit was conducted via interactive/real-time audio/video. S:     Patient requested audio/video appointment in lieu of in-person contact to reduce COVID-19 exposure risk. Visit was conducted via secure weblink through [employer] [address] Patient confirmed identity via name and birthdate, and expressed understanding of potential limits of confidentiality due to format. Patient presented alone for appointment and engaged readily. Patient reported that she had returned behavioral questionnaires which were given at the last appointment, and requested to review her responses and preliminary results. Patient discussed the questionnaires as requested and clarified areas of discrepancy between personal report and collateral information. Patient discussed continued stressors in her home and school environments, and processed recent instances of anxiety and \"meltdown. \"  Patient discussed further follow-up options based upon the results of her behavioral questionnaires and reviewed her preferences. Patient indicated that she wished to continue the evaluation process and to initiate the more extensive structured interview at next contact. Patient reviewed her use of self-care and coping strategies.       O:    Appearance    Patient presents as alert, oriented, and cooperative  Appetite normal  Sleep disturbance Yes  Loss of pleasure Yes  Speech    spontaneous, rapid and interrupting  Mood    Anxious  Affect    normal affect  Thought Process    Circumstantial with occasional full tangentiality  Insight    Good  Judgment    Noted impulsivity, capable of appropriate judgment  Memory    recent and remote memory intact  Suicide Assessment    no suicidal ideation    A:    1. Anxiety disorder, unspecified type    2. Executive function deficit        Patient continues to report episodic brief anxiety attacks, but generalized anxiety continues to remain absent for the most part. Patient continues to cope with situational stressors involving her education and the ongoing pandemic, but is experiencing an elevation of stress associated with the end of her school term and upcoming graduation. Patient did return the [facility] and [address] [place] [address] Avenue for both herself and her collateral input as requested, and preliminary scoring of these questionnaires indicates sufficient evidence to justify further exploration of patient's reported executive dysfunction. Patient has indicated that she wishes to continue with the structured interview and has requested to initiate this at next contact. Patient continues to work on the previously provided workbook chapter regarding anxiety attacks and is recommended to continue. Patient is additionally recommended to follow-up with her PCP as directed and to continue practicing consistent use of self-care and coping strategies. Patient has been scheduled to return in approximately 3 weeks for follow-up.       P:    Discussed various factors related to the development and maintenance of  anxiety and stress (including biological, cognitive, behavioral, and environmental factors), Trained in strategies for increasing balanced thinking, Trained in relaxation strategies, Discussed self-care (sleep, nutrition, rewarding activities, social support, exercise), Discussed and problem-solved barriers in adhering to behavioral change plan, Motivational Interviewing to increase patient confidence and compliance with adhering to behavioral change plan, Motivational Interviewing to determine importance and readiness for change, Discussed potential barriers to change, Established rapport, Conducted functional assessment, [place]-setting to identify pt's primary goals for [facility] visit / overall health, Supportive techniques, Provided Psychoeducation re: mind-body connection, CBT to target cognitive distortions and Identified maladaptive thoughts    Patient Plan:  1) Take a look at the workbook chapter you were provided and keep working through any elements that you haven't yet finished. 2) Fill out the questionnaires that you were sent via [employer] and return as soon as you're able. Patient to return in 3 week(s) for follow-up. All questions about treatment plan answered. Patient instructed to call the crisis line and/or proceed to emergency room if suicidal or homicidal ideations occur outside of clinic hours and crisis management skills do not provide relief. Patient stated understanding and is agreeable to treatment and crisis plan. (Please note that portions of this note were completed with a voice recognition program. Efforts were made to edit the dictations but occasionally words are mis-transcribed.)    Provider Signature:  Electronically signed by Wanda Hodgson PSYD on 3/25/2021 at 2:19 PM      Pursuant to the emergency declaration under the [facility]5 waiver authority and the [employer] Act, this Virtual Visit was conducted, with patient's consent, to reduce the patient's risk of exposure to COVID-19 and provide continuity of care for an established patient. Services were provided through a video synchronous discussion virtually to substitute for in-person clinic visit.

## 2021-03-25 NOTE — PROGRESS NOTES
Subjective:      Patient ID: Lyndon Montgomery is a 30 y.o. female. HPI Follow up on acute on chronic issues with anxiety/panic and sinus issues. ER follow up from 3/17/ and 3/20 visit for anxiety and panic, concerns for difficulty breathing. Video teleconference visit scheduled today during covid 19 restrictions. Patient consented to use personal cell phone to connect the visit at home, alone. I am in the office using [employer] software to connect. She stopped lexapro and feels some better, still anxious. Perceived sinus congest and sense /concern for throat swelling. Symptoms triggered panic attack. Started on doxycycline, but had a rash and discontinued before completing full course. Past Medical History:   Diagnosis Date    Acne     history of     Anisocoria     history of     Chickenpox     history of      DVT (deep venous thrombosis) ([facility] [address]     H/O deep venous thrombosis 8/22/2020    In left leg, due to OCPs    Obesity     Psoriasis     Seasonal allergies     Social anxiety disorder     Swollen tonsil     history of     Vitamin D deficiency 12/14/2018     Past Surgical History:   Procedure Laterality Date    [facility]OSCOPY  2010    [facility]    ENDOSCOPIC ULTRASOUND (LOWER)      OVARIAN CYST REMOVAL Right 09/27/2016    UPPER GASTROINTESTINAL ENDOSCOPY  2010    [facility]    WISDOM TOOTH EXTRACTION       Current Outpatient Medications   Medication Sig Dispense Refill    Multiple Vitamins-Minerals (THERAPEUTIC MULTIVITAMIN-MINERALS) tablet Take 1 tablet by mouth daily      Loratadine-Pseudoephedrine (CLARITIN-D 24 HOUR PO) Take 10 mg by mouth      acetaminophen (TYLENOL) 325 MG tablet Take 650 mg by mouth every 6 hours as needed for Pain      escitalopram (LEXAPRO) 5 MG tablet Take 1 tablet by mouth daily (Patient not taking: Reported on 3/25/2021) 30 tablet 2     No current facility-administered medications for this visit.       Allergies   Allergen Reactions    recurrent afrin use. flonase for persistent concerns. Some perseveration on sore throat, sinus infection concerns. She quit doxycyline prescribed in the ER due rash concerns. Plan amoxicillin course x 10 days for reassurance against ongoing sinus/throat issues. Rapid strep and mono. Screening both negative recently.             Timmy Zamarripa MD

## 2021-03-25 NOTE — PATIENT INSTRUCTIONS
1) Take a look at the workbook chapter you were provided and keep working through any elements that you haven't yet finished. 2) Fill out the questionnaires that you were sent via [employer] and return as soon as you're able.

## 2021-03-26 NOTE — ED TRIAGE NOTES
Patient presents to the ED with complaints of left arm pain. She states it is burning in nature. She had a virtual visit with Dr. Ian Fragoso today in which he was not concerned about the arm but did diagnosed her with a sinus infection and placed her on Amoxicillin. Patient has been seen several times over the last few weeks with anxiety problems. No further questions or concerns at this time. Call light within reach.

## 2021-03-26 NOTE — ED PROVIDER NOTES
eMERGENCY dEPARTMENT eNCOUnter      Pt Name: Cecilia Arguelles  MRN: [id number]  [place] 1995  Date of evaluation: 3/25/2021      CHIEF COMPLAINT       Chief Complaint   Patient presents with    Arm Pain     left burning          HISTORY OF PRESENT ILLNESS    Cecilia Arguelles is a 30 y.o. female who presents with left arm pain patient states since last night she has been having some burning to the inner aspect of the upper portion of her left arm no injury to the area no numbness tingling or weakness distally no chest pain no shortness of breath no exacerbating or relieving factors she states she had a virtual call with her primary today who she states she mentions to but that was not addressed she has not taken anything for the burning        REVIEW OF SYSTEMS       Positive burning to left upper arm negative for numbness tingling weakness chest pain shortness of breath fever chills    PAST MEDICAL HISTORY    has a past medical history of Acne, Anisocoria, Chickenpox, DVT (deep venous thrombosis) ([address] H/O deep venous thrombosis, Obesity, Psoriasis, Seasonal allergies, Social anxiety disorder, Swollen tonsil, and Vitamin D deficiency. SURGICAL HISTORY      has a past surgical history that includes [place] tooth extraction; Endoscopic ultrasonography, GI; Colonoscopy (2010); Upper gastrointestinal endoscopy (2010); and ovarian cyst removal (Right, 09/27/2016).     CURRENT MEDICATIONS       Previous Medications    ACETAMINOPHEN (TYLENOL) 325 MG TABLET    Take 650 mg by mouth every 6 hours as needed for Pain    AMOXICILLIN (AMOXIL) 875 MG TABLET    Take 1 tablet by mouth 2 times daily for 10 days    BUPROPION (WELLBUTRIN XL) 150 MG EXTENDED RELEASE TABLET    Take 1 tablet by mouth every morning    BUSPIRONE (BUSPAR) 10 MG TABLET    Take 1 tablet by mouth 3 times daily    ESCITALOPRAM (LEXAPRO) 5 MG TABLET    Take 1 tablet by mouth daily    LORATADINE-PSEUDOEPHEDRINE (CLARITIN-D 24 HOUR PO)    Take 10 mg Physician who either signs or Co-signs this chart in the absence of a cardiologist.        RADIOLOGY:   I directly visualized the following  images and reviewed the radiologist interpretations:  No orders to display         LABS:  Labs Reviewed - No data to display      EMERGENCY DEPARTMENT COURSE:   Vitals:    Vitals:    03/25/21 2049   BP: 126/82   Pulse: 68   Resp: 16   Temp: 98.1 °F (36.7 °C)   TempSrc: Tympanic   SpO2: 96%   Weight: 181 lb (82.1 kg)   Height: 5' 3\" (1.6 m)     -------------------------  BP: 126/82, Temp: 98.1 °F (36.7 °C), Pulse: 68, Resp: 16    No orders of the defined types were placed in this encounter. Re-evaluation Notes    Patient has some isolated burning sensation to the inner aspect of her left arm with no indications of infection no indication of DVT no indication for other acute neurological event this may be a peripheral nerve it may be secondary to her anxiety at this time I do feel she is stable be discharged with follow-up to her primary and return if worse    CRITICAL CARE:   None      CONSULTS:      PROCEDURES:  None    FINAL IMPRESSION      1. Left arm pain          DISPOSITION/PLAN   DISPOSITION Decision To Discharge 03/25/2021 09:00:21 PM      Condition on Disposition    Stable    PATIENT REFERRED TO:  Beth Kent MD  [place] [id number]  [phone]    In 2 days        DISCHARGE MEDICATIONS:  New Prescriptions    No medications on file       (Please note that portions of this note were completed with a voice recognition program.  Efforts were made to edit the dictations but occasionally words are mis-transcribed.)    Benavides Cera, MD, F.A.C.E.P.   Attending Emergency Physician        Makayla Perez MD  03/25/21 5229

## 2021-03-29 ENCOUNTER — HOSPITAL ENCOUNTER (OUTPATIENT)
Dept: NON INVASIVE DIAGNOSTICS | Age: 26
Discharge: HOME | End: 2021-03-29
Payer: COMMERCIAL

## 2021-03-29 DIAGNOSIS — R00.2 PALPITATIONS: ICD-10-CM

## 2021-03-29 PROCEDURE — 93225 XTRNL ECG REC<48 HRS REC: CPT

## 2021-03-29 PROCEDURE — 93226 XTRNL ECG REC<48 HR SCAN A/R: CPT

## 2021-03-29 NOTE — PROGRESS NOTES
24hr holter monitor applied and explained to patient who states understanding. Patient will drop off holter monitor in 24hrs.

## 2021-03-30 ENCOUNTER — HOSPITAL ENCOUNTER (OUTPATIENT)
Dept: NON INVASIVE DIAGNOSTICS | Age: 26
Discharge: HOME | End: 2021-03-30
Payer: COMMERCIAL

## 2021-03-31 ENCOUNTER — TELEPHONE (OUTPATIENT)
Dept: FAMILY MEDICINE CLINIC | Age: 26
End: 2021-03-31

## 2021-04-01 ENCOUNTER — HOSPITAL ENCOUNTER (EMERGENCY)
Age: 26
Discharge: HOME | End: 2021-04-01
Attending: EMERGENCY MEDICINE
Payer: COMMERCIAL

## 2021-04-01 VITALS
DIASTOLIC BLOOD PRESSURE: 83 MMHG | WEIGHT: 180 LBS | SYSTOLIC BLOOD PRESSURE: 117 MMHG | HEIGHT: 63 IN | HEART RATE: 65 BPM | BODY MASS INDEX: 31.89 KG/M2 | OXYGEN SATURATION: 98 % | TEMPERATURE: 97.8 F | RESPIRATION RATE: 16 BRPM

## 2021-04-01 DIAGNOSIS — T50.905A ADVERSE EFFECT OF DRUG, INITIAL ENCOUNTER: Primary | ICD-10-CM

## 2021-04-01 DIAGNOSIS — L50.9 URTICARIA: ICD-10-CM

## 2021-04-01 PROCEDURE — 99284 EMERGENCY DEPT VISIT MOD MDM: CPT

## 2021-04-01 RX ORDER — FAMOTIDINE 20 MG/1
20 TABLET, FILM COATED ORAL 2 TIMES DAILY
Qty: 28 TABLET | Refills: 0 | Status: SHIPPED | OUTPATIENT
Start: 2021-04-01 | End: 2021-10-06

## 2021-04-01 RX ORDER — MOMETASONE FUROATE 50 UG/1
2 SPRAY, METERED NASAL DAILY
Qty: 1 INHALER | Refills: 0 | Status: SHIPPED | OUTPATIENT
Start: 2021-04-01 | End: 2022-06-07

## 2021-04-02 ASSESSMENT — ENCOUNTER SYMPTOMS
SHORTNESS OF BREATH: 0
VOMITING: 0
NAUSEA: 0
DIARRHEA: 0

## 2021-04-02 NOTE — ED PROVIDER NOTES
medication. Patient then started on Lexapro took a couple days with the medication did not like how it made her feel did stop that medication. She is now prescribed BuSpar which she has not started yet. At present the patient is not having difficulty breathing or swallowing. She does not have chest pain or shortness of breath. She does not have widespread hives or facial swelling. No syncope. No nausea vomiting or diarrhea. No diffuse swelling. REVIEW OF SYSTEMS     Review of Systems   Constitutional: Negative for fever. Respiratory: Negative for shortness of breath. Cardiovascular: Negative for chest pain. Gastrointestinal: Negative for diarrhea, nausea and vomiting. Skin: Positive for rash. Isolated urticaria. Neurological: Negative for syncope and headaches. All other systems reviewed and are negative. PAST MEDICAL HISTORY    has a past medical history of Acne, Anisocoria, Chickenpox, DVT (deep venous thrombosis) ([address] H/O deep venous thrombosis, Obesity, Psoriasis, Seasonal allergies, Social anxiety disorder, Swollen tonsil, and Vitamin D deficiency. SURGICAL HISTORY      has a past surgical history that includes Rub[place] tooth extraction; Endoscopic ultrasonography, GI; Colonoscopy (2010); Upper gastrointestinal endoscopy (2010); and ovarian cyst removal (Right, 09/27/2016).     CURRENT MEDICATIONS       Discharge Medication List as of 4/1/2021 10:16 PM      CONTINUE these medications which have NOT CHANGED    Details   Multiple Vitamins-Minerals (THERAPEUTIC MULTIVITAMIN-MINERALS) tablet Take 1 tablet by mouth dailyHistorical Med      buPROPion (WELLBUTRIN XL) 150 MG extended release tablet Take 1 tablet by mouth every morning, Disp-30 tablet, R-3Normal      busPIRone (BUSPAR) 10 MG tablet Take 1 tablet by mouth 3 times daily, Disp-90 tablet, R-0Normal      escitalopram (LEXAPRO) 5 MG tablet Take 1 tablet by mouth daily, Disp-30 tablet, R-2Normal Loratadine-Pseudoephedrine (CLARITIN-D 24 HOUR PO) Take 10 mg by mouthHistorical Med      acetaminophen (TYLENOL) 325 MG tablet Take 650 mg by mouth every 6 hours as needed for PainHistorical Med             ALLERGIES     is allergic to effexor xr [venlafaxine hcl]; other; and ibuprofen. FAMILY HISTORY     She indicated that her mother is alive. She indicated that her father is alive. She indicated that her maternal grandmother is alive. She indicated that her maternal grandfather is alive. She indicated that her paternal grandmother is alive. She indicated that her paternal grandfather is alive. She indicated that her maternal uncle is alive. She indicated that the status of her other is unknown. She indicated that the status of her neg hx is unknown.     family history includes Deep Vein Thrombosis in her father and mother; Depression in her father and mother; Diabetes in her paternal grandfather; Other in her father and mother; Thyroid Disease in her maternal grandmother, maternal uncle, and another family member. SOCIAL HISTORY      reports that she has never smoked. She has never used smokeless tobacco. She reports current alcohol use. She reports that she does not use drugs. PHYSICAL EXAM     INITIAL VITALS:  height is 5' 3\" (1.6 m) and weight is 180 lb (81.6 kg). Her tympanic temperature is 97.8 °F (36.6 °C). Her blood pressure is 117/83 and her pulse is 65. Her respiration is 16 and oxygen saturation is 98%. Physical Exam  Vitals signs reviewed. Constitutional:       General: She is not in acute distress. Appearance: Normal appearance. She is not ill-appearing or toxic-appearing. HENT:      Head: Normocephalic and atraumatic. Right Ear: Tympanic membrane normal.      Left Ear: Tympanic membrane normal.      Nose: Nose normal.      Mouth/Throat:      Mouth: Mucous membranes are moist.   Eyes:      Extraocular Movements: Extraocular movements intact.       Pupils: Pupils are equal, round, and reactive to light. Neck:      Musculoskeletal: Normal range of motion and neck supple. No neck rigidity. Cardiovascular:      Rate and Rhythm: Normal rate and regular rhythm. Pulses: Normal pulses. Heart sounds: Normal heart sounds. Pulmonary:      Effort: Pulmonary effort is normal.      Breath sounds: Normal breath sounds. No wheezing. Musculoskeletal:         General: No swelling. Skin:     General: Skin is warm and dry. Capillary Refill: Capillary refill takes less than 2 seconds. Comments: Isolated urticaria on the right upper back. Neurological:      General: No focal deficit present. Mental Status: She is alert and oriented to person, place, and time. Cranial Nerves: No cranial nerve deficit. Psychiatric:      Comments: Patient expresses feeling anxious about trying new medications. DIFFERENTIAL DIAGNOSIS / MDM / EMERGENCY DEPARTMENT COURSE:     At this point patient has started 2 different courses of antibiotics which she has not been fully compliant with. I do not feel that antibiotics need to be resumed at this point. I discussed with her that if she is not tolerating the amoxicillin, or doxycycline Levaquin would be a possibility however that does have a black box warning. Patient does not wish to proceed with this at that time and I do not feel that antibiotics are needed at this time. Patient expresses concern about the sore throat that seems to be persistent. She states that her PCP Dr. Rocco Knott did state that it could also be related to acid reflux. Patient states that she is just frustrated because she does not know what is going on. I did have a very bell discussion with the patient that I think that the symptoms she is experiencing are being exacerbated by her not being compliant with an entire course of the medications that are being recommended. Patient seems to be very comfortable with taking Claritin.   We will resume the Claritin which will address the urticaria and some of the sinus symptoms. We will also add Pepcid which will augment the Claritin and will also help with acid reflux which may be one of the etiologies of her pharyngitis. Additionally I have recommended a steroid nasal spray which she indicates she would be agreeable to take as well. This should help with sinus symptoms as well as possibly the pharyngitis that is related to postnasal drip. I have carefully discussed with the patient the warning signs which return to the emergency department and in speaking to her I feel that she seems on board with the plan as outlined. I have also educated her that over-the-counter saline nasal lavage may also be helpful. She is encouraged to follow-up with her PCP and to return to the emergency department for any other acute concerns. In review of her records as well as her physical presentation and history I do not feel that there is currently any life-threatening process and that outpatient management is appropriate. I have reviewed the disposition diagnosis with the patient and or their family/guardian. I have answered their questions and givendischarge instructions. They voiced understanding of these instructions and did not have any further questions or complaints.     DIAGNOSTIC RESULTS     EKG: All EKG's are interpreted by the Emergency Department Physician who either signs or Co-signs this chart inthe absence of a cardiologist.        RADIOLOGY:   I directly visualized the following plain film images and reviewed the radiologistinterpretations of radiologic studies:    No orders to display        Xr Chest Portable    Result Date: 3/20/2021  EXAMINATION: ONE XRAY VIEW OF THE CHEST 3/20/2021 7:26 pm COMPARISON: March 8, 2021 HISTORY: ORDERING SYSTEM PROVIDED HISTORY: shortness of breath TECHNOLOGIST PROVIDED HISTORY: shortness of breath Reason for Exam: Fatigue, SOB Acuity: Acute Type of Exam: Initial FINDINGS: The lungs are without acute focal process. There is no effusion or pneumothorax. The cardiomediastinal silhouette is without acute process. The osseous structures are without acute process. No acute process. Xr Chest Portable    Result Date: 3/8/2021  EXAMINATION: ONE XRAY VIEW OF THE CHEST 3/8/2021 3:55 pm COMPARISON: 22 December 2020 HISTORY: ORDERING SYSTEM PROVIDED HISTORY: palpitations TECHNOLOGIST PROVIDED HISTORY: palpitations Reason for Exam: Tachycardia; palpitations; GP used FINDINGS: AP portable view of the chest time stamped at 1552 hours demonstrates overlying cardiac monitoring electrodes. Heart size is normal.  No vascular congestion, focal consolidation, effusion, or pneumothorax is noted. Osseous and mediastinal structures are age-appropriate. No acute cardiopulmonary process. LABS:  No results found for this visit on 04/01/21. EMERGENCY DEPARTMENT COURSE:   Vitals:    Vitals:    04/01/21 2111 04/01/21 2223   BP: 123/78 117/83   Pulse: 76 65   Resp: 16 16   Temp: 97.8 °F (36.6 °C)    TempSrc: Tympanic    SpO2: 98%    Weight: 180 lb (81.6 kg)    Height: 5' 3\" (1.6 m)      -------------------------  BP: 117/83, Temp: 97.8 °F (36.6 °C), Pulse: 65, Resp: 16      CONSULTS:  None    PROCEDURES:  None    FINAL IMPRESSION      1. Adverse effect of drug, initial encounter    2.  Urticaria          DISPOSITION/PLAN   DISPOSITION Decision To Discharge 04/01/2021 09:50:12 PM      PATIENT REFERRED TO:  Nahed Meredith MD  [address] [place] [address]  [phone]    In 1 day        DISCHARGE MEDICATIONS:  Discharge Medication List as of 4/1/2021 10:16 PM      START taking these medications    Details   mometasone (NASONEX) 50 MCG/ACT nasal spray 2 sprays by Each Nostril route daily, Each Nostril, DAILY Starting Thu 4/1/2021, Disp-1 Inhaler, R-0, Normal      famotidine (PEPCID) 20 MG tablet Take 1 tablet by mouth 2 times daily for 14 days, Disp-28 tablet, R-0Normal (Please note that portions of this note were completed with avoice recognition program.  Efforts were made to edit the dictations but occasionally words are mis-transcribed.)    Kailyn Obrien MD, [facility]  Attending Emergency Medicine Physician        Kailyn Obrien MD  04/02/21 [id number]

## 2021-04-06 ENCOUNTER — OFFICE VISIT (OUTPATIENT)
Dept: CARDIOLOGY | Age: 26
End: 2021-04-06
Payer: COMMERCIAL

## 2021-04-06 VITALS
DIASTOLIC BLOOD PRESSURE: 70 MMHG | HEIGHT: 63 IN | BODY MASS INDEX: 31.89 KG/M2 | WEIGHT: 180 LBS | HEART RATE: 86 BPM | SYSTOLIC BLOOD PRESSURE: 124 MMHG

## 2021-04-06 DIAGNOSIS — F41.9 ANXIETY: Primary | ICD-10-CM

## 2021-04-06 DIAGNOSIS — E66.09 CLASS 1 OBESITY DUE TO EXCESS CALORIES WITH SERIOUS COMORBIDITY IN ADULT, UNSPECIFIED BMI: ICD-10-CM

## 2021-04-06 DIAGNOSIS — R94.31 ABNORMAL HOLTER EXAM: ICD-10-CM

## 2021-04-06 DIAGNOSIS — R00.2 PALPITATION: ICD-10-CM

## 2021-04-06 DIAGNOSIS — R94.31 ABNORMAL ECG: ICD-10-CM

## 2021-04-06 PROCEDURE — 99204 OFFICE O/P NEW MOD 45 MIN: CPT | Performed by: INTERNAL MEDICINE

## 2021-04-06 NOTE — PROGRESS NOTES
Cardiology Consultation  [facility]  [place], [place], [place])    04/06/21      CC: Abn holter, abn ecg, palpitations    HPI:  Brennan Zamorano  is doing well from a cardiac standpoint. Good functional capacity with no significant change in functional capacity. No chest pain, no dyspnea, no PND, no syncope or pre-syncope, no orthopnea. No symptoms of CHF or angina/chest pain. Had abn holter showing rare PACs, and \"ECG is always abnormal\". Has been having palpitations frequently when having anxiety and panic attacks, especially in the heat.     Past Medical History:   Diagnosis Date    Abnormal ECG     Abnormal Holter exam     Acne     history of     Anisocoria     history of     Chickenpox     history of      DVT (deep [address]) ([facility] [address]     H/O deep venous thrombosis 08/22/2020    In left leg, due to OCPs    Obesity     Palpitation     Psoriasis     Seasonal allergies     Social anxiety disorder     Swollen tonsil     history of     Vitamin D deficiency 12/14/2018       Past Surgical History:   Procedure Laterality Date    COL[facility]OSCOPY  2010    [facility]    ENDOSCOPIC ULTRASOUND (LOWER)      OVARIAN CYST REMOVAL Right 09/27/2016    UPPER GASTROINTESTINAL ENDOSCOPY  [facility]    WISDOM TOOTH EXTRACTION         Family History   Problem Relation Age of Onset    Other Mother         acne    Depression Mother     Deep Vein Thrombosis Mother     Other Father         acne    Depression Father     Deep Vein Thrombosis Father     Thyroid Disease Maternal Grandmother     Diabetes Paternal Grandfather     Thyroid Disease Maternal Uncle     Thyroid Disease Other     Glaucoma Neg Hx     Cataracts Neg Hx        Social History     Socioeconomic History    Marital status:      Spouse name: None    Number of children: None    Years of education: None    Highest education level: None   Occupational History     Employer: [address] [place] [address] [employer] Needs    Financial resource strain: None    Food insecurity     Worry: None     Inability: None    Transportation needs     Medical: None     Non-medical: None   Tobacco Use    Smoking status: Never Smoker    Smokeless tobacco: Never Used   Substance and Sexual Activity    Alcohol use: Yes     Alcohol/week: 0.0 standard drinks     Comment: socially    Drug use: No    Sexual activity: Yes     Partners: Male     Birth control/protection: Pill   Lifestyle    Physical activity     Days per week: None     Minutes per session: None    Stress: None   Relationships    Social connections     Talks on phone: None     Gets together: None     Attends Sikhism service: None     Active member of club or organization: None     Attends meetings of clubs or organizations: None     Relationship status: None    Intimate partner violence     Fear of current or ex partner: None     Emotionally abused: None     Physically abused: None     Forced sexual activity: None   Other Topics Concern    None   Social History Narrative    None        Allergies   Allergen Reactions    Effexor Xr [Venlafaxine Hcl]      Nausea and panic attack    Other Other (See Comments)     Purex detergent. Tightness in throat  B[employer] softener.  Itching    Ibuprofen Nausea And Vomiting       Current Outpatient Medications   Medication Sig Dispense Refill    mometasone (NASONEX) 50 MCG/ACT nasal spray 2 sprays by Each Nostril route daily 1 Inhaler 0    Multiple Vitamins-Minerals (THERAPEUTIC MULTIVITAMIN-MINERALS) tablet Take 1 tablet by mouth daily      Loratadine-Pseudoephedrine (CLARITIN-D 24 HOUR PO) Take 10 mg by mouth      acetaminophen (TYLENOL) 325 MG tablet Take 650 mg by mouth every 6 hours as needed for Pain      famotidine (PEPCID) 20 MG tablet Take 1 tablet by mouth 2 times daily for 14 days (Patient not taking: Reported on 4/6/2021) 28 tablet 0    buPROPion (WELLBUTRIN XL) 150 MG extended release tablet Take 1 tablet by (81.6 kg)   [facility] 03/18/2021   BMI 31.89 kg/m²   General appearance: alert and cooperative with exam  HEENT: Head: Normocephalic, no lesions, without obvious abnormality. Eyes: PERRL, EOMI  Ears: Not obvious deformations or lack of hearing  Neck: no carotid bruit, no JVD  Lungs: clear to auscultation bilaterally  Heart: regular rate and rhythm, S1, S2 normal, no murmur, click, rub or gallop  Abdomen: soft, non-tender; bowel sounds normal; no masses,  no organomegaly  Extremities: extremities normal, atraumatic, no cyanosis or edema  Neurologic: Mental status: Alert, oriented, thought content appropriate  Skin: WNL for age and condition, no obvious rashes or leasions      EKG: Normal Sinus Rhythm with no ischemic changes. Reviewed today's ECG along serial ECGs    LAST ECHO:    LAST STRESS:    LAST CATH:      Past Medical and Surgical History, Problem List, Allergies, Medications, Labs, Imaging, all reviewed extensively in EMR and with the patient. Assessment and Plan:    1. Palpitations, anxiety, abn holter, abn ECG- check echo  2. Anxiety- stable on meds, but has not been taking meds  3. Psoriasis on hands  4. Heat worsens palpations  5. Obesity- discussed calorie restriction        Thank you for allowing me to participate in the care of this patient, please do not hesitate to call if you have any questions. Edyta Correa, [id number] [facility]  [facility] [employer]  [phone]

## 2021-04-16 ENCOUNTER — TELEMEDICINE (OUTPATIENT)
Dept: BEHAVIORAL/MENTAL HEALTH | Age: 26
End: 2021-04-16
Payer: COMMERCIAL

## 2021-04-16 DIAGNOSIS — R41.844 EXECUTIVE FUNCTION DEFICIT: ICD-10-CM

## 2021-04-16 DIAGNOSIS — F41.9 ANXIETY DISORDER, UNSPECIFIED TYPE: Primary | ICD-10-CM

## 2021-04-16 PROCEDURE — 90834 PSYTX W PT 45 MINUTES: CPT | Performed by: COUNSELOR

## 2021-04-16 NOTE — PATIENT INSTRUCTIONS
1) Take a look at the workbook chapter you were provided and keep working through any elements that you haven't yet finished.

## 2021-04-16 NOTE — PROGRESS NOTES
This note will not be viewable in [employer] for the following reason(s). This is a Psychotherapy Note. TELEHEALTH Consultation/Psychotherapy-- Audio/Visual   (During TKTWG-12 public health emergency)  Vaibhav Huber Psy.D. Visit Date:  4/16/2021    Patient:  Jayjay Nava  YOB: 1995  Chief Complaint:  Follow-up    Duration of session:  47 minutes    Patient Location:   Patient home [place], [place])    Provider Location:   [place], [place]    This virtual visit was conducted via interactive/real-time audio/video. S:     Patient requested audio/video appointment in lieu of in-person contact to reduce COVID-19 exposure risk. Visit was conducted via secure weblink through [email or web address] [employer]. Patient confirmed identity via name and birthdate, and expressed understanding of potential limits of confidentiality due to format. Patient presented alone for appointment and engaged readily. Patient discussed her recent ER visit and processed her anxiety regarding medication reactions and perceived physical sensitivity. Patient discussed the somatic-oriented nature of her anxiety and reviewed strategies for handling future incidents should they occur. Patient discussed her upcoming graduation from college and her plans for employment post-graduation. Patient initiated the DIVA-5 structured interview for further exploration of her reported persistent symptoms of executive dysfunction, and was able to successfully complete the first several questions of the form. Patient reviewed her use of self-care and coping strategies.       O:    Appearance    Patient presents as alert, oriented, and cooperative  Appetite normal  Sleep disturbance Yes  Loss of pleasure Yes  Speech    spontaneous, rapid and interrupting  Mood    Anxious  Affect    normal affect  Thought Process    Circumstantial with occasional full tangentiality  Insight    Good  Judgment    Noted impulsivity, capable of appropriate judgment Memory    recent and remote memory intact  Suicide Assessment    no suicidal ideation      A:    1. Anxiety disorder, unspecified type    2. 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 Patient initiated completion of the DIVA-5 structured interview during today's contact as previously planned, and was able to complete several elements of the first subsection, albeit not enough to allow for a more specific diagnostic determination. Patient is recommended to return for further work on the structured interview, and is in agreement with this plan. Patient is additionally recommended to continue work on the previously provided workbook chapter regarding anxiety attacks, to follow-up with her PCP as directed, to maintain compliance with medication and treatment elements, and to continue practicing consistent use of self-care and coping strategies. Patient has been scheduled to return in approximately 3 to 4 weeks for follow-up.       P:    Discussed various factors related to the development and maintenance of  anxiety and stress (including biological, cognitive, behavioral, and environmental factors), Trained in strategies for increasing balanced thinking, Trained in relaxation strategies, Discussed self-care (sleep, nutrition, rewarding activities, social support, exercise), Discussed and problem-solved barriers in adhering to behavioral change plan, Motivational Interviewing to increase patient confidence and compliance with adhering to behavioral change plan, Motivational Interviewing to determine importance and readiness for change, Discussed potential barriers to change, Established rapport, Conducted functional assessment, [place]-setting to identify pt's primary goals for [facility] visit / overall health, Supportive techniques, Provided Psychoeducation re:

## 2021-04-26 ENCOUNTER — HOSPITAL ENCOUNTER (OUTPATIENT)
Dept: NON INVASIVE DIAGNOSTICS | Age: 26
Discharge: HOME | End: 2021-04-26
Payer: COMMERCIAL

## 2021-04-26 ENCOUNTER — TELEPHONE (OUTPATIENT)
Dept: CARDIOLOGY | Age: 26
End: 2021-04-26

## 2021-04-26 DIAGNOSIS — R94.31 ABNORMAL HOLTER EXAM: ICD-10-CM

## 2021-04-26 DIAGNOSIS — R00.2 PALPITATION: ICD-10-CM

## 2021-04-26 DIAGNOSIS — R94.31 ABNORMAL ECG: ICD-10-CM

## 2021-04-26 LAB
LV EF: 60 %
LVEF MODALITY: NORMAL

## 2021-04-26 PROCEDURE — 93306 TTE W/DOPPLER COMPLETE: CPT

## 2021-05-27 ENCOUNTER — VIRTUAL VISIT (OUTPATIENT)
Dept: BEHAVIORAL/MENTAL HEALTH | Age: 26
End: 2021-05-27
Payer: COMMERCIAL

## 2021-05-27 DIAGNOSIS — F41.9 ANXIETY DISORDER, UNSPECIFIED TYPE: Primary | ICD-10-CM

## 2021-05-27 DIAGNOSIS — R41.844 EXECUTIVE FUNCTION DEFICIT: ICD-10-CM

## 2021-05-27 PROCEDURE — 90834 PSYTX W PT 45 MINUTES: CPT | Performed by: COUNSELOR

## 2021-06-04 ENCOUNTER — VIRTUAL VISIT (OUTPATIENT)
Dept: BEHAVIORAL/MENTAL HEALTH | Age: 26
End: 2021-06-04
Payer: COMMERCIAL

## 2021-06-04 DIAGNOSIS — R41.844 EXECUTIVE FUNCTION DEFICIT: ICD-10-CM

## 2021-06-04 DIAGNOSIS — F41.9 ANXIETY DISORDER, UNSPECIFIED TYPE: Primary | ICD-10-CM

## 2021-06-04 PROCEDURE — 90837 PSYTX W PT 60 MINUTES: CPT | Performed by: COUNSELOR

## 2021-06-10 NOTE — PROGRESS NOTES
See psychotherapy note for further information on encounter; note added in this section as workaround for EHR error.

## 2021-06-10 NOTE — PSYCHOTHERAPY
TELEHEALTH Consultation/Psychotherapy-- Audio/Visual   (During MAALE-19 public health emergency)  Anthony Nunez Psy.D. Visit Date:  6/4/2021    Patient:  Ronny Schulz  YOB: 1995  Chief Complaint:  Follow-up    Duration of session:  57 minutes    Patient Location:   Patient home [place], [place])    Provider Location:   [place], [place]    This virtual visit was conducted via interactive/real-time audio/video. S:     Patient requested audio/video appointment in lieu of in-person contact to reduce COVID-19 exposure risk. Visit was conducted via secure weblink through doxy. me. Patient confirmed identity via name and birthdate, and expressed understanding of potential limits of confidentiality due to format. Patient presented alone for appointment and engaged readily. Patient discussed her continued adjustment following college graduation and noted increased uncertainty regarding her future goals and whether/how to utilize her recently earned degree. Patient discussed the impact of others' expectations upon her decision-making and processed her efforts to cope with the dysfunctional dynamics in her family, including those caused by her father's drinking problem and her parents' volatile relationship. Patient discussed the impact of various key experiences upon her self-esteem and her ability to develop meaningful peer or romantic relationships. Patient processed the ways in which she had worked to change those tendencies in her marriage. Patient engaged in thought challenging and cognitive restructuring as needed. Patient did not continue work on the DIVA-5 structured interview today due to her perceived need to process the other content discussed today, but confirmed her intent to proceed during a future appointment. Patient reviewed her use of self-care and coping strategies.       O:    Appearance    Patient presents as alert, oriented, and cooperative  Appetite normal  Sleep disturbance Yes  Loss of pleasure Yes  Speech    spontaneous, rapid and interrupting  Mood    Anxious  Affect    normal affect  Thought Process    Circumstantial with occasional full tangentiality  Insight    Good  Judgment    Noted impulsivity, capable of appropriate judgment  Memory    recent and remote memory intact  Suicide Assessment    no suicidal ideation      A:    1. Anxiety disorder, unspecified type    2. Executive function deficit        Patient continues to report some reduction in her anxiety symptoms, and is increasingly recognizing the ways in which her symptoms and their presentation are linked to her past experiences and historical difficulty managing them. Patient continues to work to improve her utilization of self-care and proactive coping technique usage. Patient was unable to continue work on the DIVA-5 structured interview in lieu of discussing the previously noted content, but plans to continue work on the interview in future contacts. Patient is recommended to continue working on provided workbook modules regarding anxiety attacks, to maintain medication compliance, to follow-up with her PCP as directed, and to follow-up with behavioral health as needed. Patient is currently scheduled to return for follow-up in 2 weeks.        P:    Discussed various factors related to the development and maintenance of  anxiety and stress (including biological, cognitive, behavioral, and environmental factors), Trained in strategies for increasing balanced thinking, Trained in relaxation strategies, Discussed self-care (sleep, nutrition, rewarding activities, social support, exercise), Discussed and problem-solved barriers in adhering to behavioral change plan, Motivational Interviewing to increase patient confidence and compliance with adhering to behavioral change plan, Motivational Interviewing to determine importance and readiness for change, Discussed potential barriers to change, Established rapport, Conducted functional assessment, [place]-setting to identify pt's primary goals for [facility] visit / overall health, Supportive techniques, Provided Psychoeducation re: mind-body connection, CBT to target cognitive distortions and Identified maladaptive thoughts    Patient Plan:  1) Take a look at the workbook chapter you were provided and keep working through any elements that you haven't yet finished. Patient to return in 2 week(s) for follow-up. All questions about treatment plan answered. Patient instructed to call the crisis line and/or proceed to emergency room if suicidal or homicidal ideations occur outside of clinic hours and crisis management skills do not provide relief. Patient stated understanding and is agreeable to treatment and crisis plan. (Please note that portions of this note were completed with a voice recognition program. Efforts were made to edit the dictations but occasionally words are mis-transcribed.)    Provider Signature:  Electronically signed by Inder Valencia PSYD on 6/10/2021 at 9:12 AM      Pursuant to the emergency declaration under the [facility], [facility] waiver authority and the [employer] and [employer] Act, this Virtual Visit was conducted, with patient's consent, to reduce the patient's risk of exposure to COVID-19 and provide continuity of care for an established patient. Services were provided through a video synchronous discussion virtually to substitute for in-person clinic visit.

## 2021-06-10 NOTE — PSYCHOTHERAPY
TELEHEALTH Consultation/Psychotherapy-- Audio/Visual   (During RETFE-05 public health emergency)  Karishma Mario. Adi Downs Psy.D. Visit Date:  5/27/2021    Patient:  Ray Denver  YOB: 1995  Chief Complaint:  Follow-up    Duration of session:  40 minutes    Patient Location:   Patient home [place], [place])    Provider Location:   [place], [place]    This virtual visit was conducted via interactive/real-time audio/video. S:     Patient requested audio/video appointment in lieu of in-person contact to reduce COVID-19 exposure risk. Visit was conducted via secure weblink through doxy. me. Patient confirmed identity via name and birthdate, and expressed understanding of potential limits of confidentiality due to format. Patient presented alone for appointment and engaged readily. Patient discussed her recent missed appointment and processed her adjustment to life after her college graduation. Patient discussed her work at two part-time jobs, including areas of interference by previously reported symptoms. Patient discussed the status of her preexisting anxiety symptoms and noted that they had diminished somewhat following her graduation, due to the reduction of overall stress she was experiencing. Patient continued work on the DIVA-5 structured interview for executive dysfunction and was able to complete several additional elements, but could not fully complete the remaining components with available time. Patient reviewed her use of self-care and coping strategies.       O:    Appearance    Patient presents as alert, oriented, and cooperative  Appetite normal  Sleep disturbance Yes  Loss of pleasure Yes  Speech    spontaneous, rapid and interrupting  Mood    Anxious  Affect    normal affect  Thought Process    Circumstantial with occasional full tangentiality  Insight    Good  Judgment    Noted impulsivity, capable of appropriate judgment  Memory    recent and remote memory intact  Suicide Assessment    no suicidal ideation      A:    1. Anxiety disorder, unspecified type    2. Executive function deficit        Patient is reporting some reduction in her anxiety symptoms after graduating from college and no longer needing to balance schoolwork with her employment and family-related stressors. Patient continues to work to improve her utilization of self-care and proactive coping technique usage. Patient continued work on the DIVA-5 structured interview, but was unable to complete sufficient elements to allow for even preliminary diagnosis at this time. Patient plans to continue work on the interview in future contacts. Patient is recommended to continue working on provided workbook modules regarding anxiety attacks, to maintain medication compliance, to follow-up with her PCP as directed, and to follow-up with behavioral health as needed. Patient is currently scheduled to return for follow-up in 1 week due to her need to abbreviate today's contact due to a conflicting work shift.        P:    Discussed various factors related to the development and maintenance of  anxiety and stress (including biological, cognitive, behavioral, and environmental factors), Trained in strategies for increasing balanced thinking, Trained in relaxation strategies, Discussed self-care (sleep, nutrition, rewarding activities, social support, exercise), Discussed and problem-solved barriers in adhering to behavioral change plan, Motivational Interviewing to increase patient confidence and compliance with adhering to behavioral change plan, Motivational Interviewing to determine importance and readiness for change, Discussed potential barriers to change, Established rapport, Conducted functional assessment, [place]-setting to identify pt's primary goals for [facility] visit / overall health, Supportive techniques, Provided Psychoeducation re: mind-body connection, CBT to target cognitive distortions and Identified maladaptive thoughts Patient Plan:  1) Take a look at the workbook chapter you were provided and keep working through any elements that you haven't yet finished. Patient to return in 1 week(s) for follow-up. All questions about treatment plan answered. Patient instructed to call the crisis line and/or proceed to emergency room if suicidal or homicidal ideations occur outside of clinic hours and crisis management skills do not provide relief. Patient stated understanding and is agreeable to treatment and crisis plan. (Please note that portions of this note were completed with a voice recognition program. Efforts were made to edit the dictations but occasionally words are mis-transcribed.)    Provider Signature:  Electronically signed by Cleopatra Grady PSYD on 6/10/2021 at 9:10 AM      Pursuant to the emergency declaration under the [facility], 1135 waiver authority and the [employer] and [employer] Act, this Virtual Visit was conducted, with patient's consent, to reduce the patient's risk of exposure to COVID-19 and provide continuity of care for an established patient. Services were provided through a video synchronous discussion virtually to substitute for in-person clinic visit.

## 2021-06-15 ENCOUNTER — VIRTUAL VISIT (OUTPATIENT)
Dept: BEHAVIORAL/MENTAL HEALTH | Age: 26
End: 2021-06-15
Payer: COMMERCIAL

## 2021-06-15 DIAGNOSIS — F41.9 ANXIETY DISORDER, UNSPECIFIED TYPE: Primary | ICD-10-CM

## 2021-06-15 DIAGNOSIS — R41.844 EXECUTIVE FUNCTION DEFICIT: ICD-10-CM

## 2021-06-15 PROCEDURE — 90832 PSYTX W PT 30 MINUTES: CPT | Performed by: COUNSELOR

## 2021-06-15 NOTE — PSYCHOTHERAPY
TELEHEALTH Consultation/Psychotherapy-- Audio/Visual   (During LYWDX-03 public health emergency)  Cindi Carrillo. Estefanía Gilman Psy.D. Visit Date:  6/15/2021    Patient:  Arcelia Beyer  YOB: 1995  Chief Complaint:  Follow-up    Duration of session:  33 minutes    Patient Location:   Patient home [place], [place])    Provider Location:   [place], [place]    This virtual visit was conducted via interactive/real-time audio/video. S:     Patient requested audio/video appointment in lieu of in-person contact to reduce COVID-19 exposure risk. Visit was conducted via secure weblink through doxy. me. Patient confirmed identity via name and birthdate, and expressed understanding of potential limits of confidentiality due to format. Patient presented alone for appointment and engaged readily. Patient reported having obtained a second job and discussed the impact upon her schedule, stress levels, and self-care. Patient discussed aspects of her medical condition which directly impacted her stamina for work, and identified strategies that she used to manage those concerns as well as potential red flags that she may be taking on too much to reasonably sustain physically. Patient discussed her efforts to rehab her credit and obtain funding to buy a new house, following encouragement from her in-laws for she and her  to find an independent residence before her in-laws' upcoming FPC. Patient discussed available community resources that could assist her in additional financial literacy. Patient processed a \"existential moment\" she had experienced the previous day in which she found herself questioning her chosen career path as a suitable option for her long-term prospects. Patient engaged in thought challenging and cognitive restructuring as needed.   Patient did not continue work on the DIVA-5 structured interview today, as she signed on significantly late for the virtual encounter and insufficient time was available. Patient confirmed her preference to focus exclusively on the interview at next contact in order to compensate for her lack of progress in recent sessions. Patient reviewed her use of self-care and coping strategies. O:    Appearance    Patient presents as alert, oriented, and cooperative  Appetite normal  Sleep disturbance Yes  Loss of pleasure Yes  Speech    spontaneous, rapid and interrupting  Mood    Anxious  Affect    normal affect  Thought Process    Circumstantial with occasional full tangentiality  Insight    Good  Judgment    Noted impulsivity, capable of appropriate judgment  Memory    recent and remote memory intact  Suicide Assessment    no suicidal ideation      A:    1. Anxiety disorder, unspecified type    2. Executive function deficit        Patient continues to report reduction in her anxiety symptoms and is taking ongoing steps to manage her self-care and physical health due to her increased recognition of how they inform her mental wellbeing. Patient is experiencing a minor increase in stress associated with taking on an additional part-time position, but is tolerating the new stress well. Patient continues to work to improve her utilization of self-care and proactive coping technique usage. Patient was unable to continue work on the DIVA-5 structured interview due to her tardiness resulting in a lack of time, but plans to continue work on the interview at next appointment. Patient is recommended to continue working on provided workbook modules regarding anxiety attacks, to maintain medication compliance, to follow-up with her PCP as directed, and to follow-up with behavioral health as needed. Patient is currently scheduled to return for follow-up in 1 week, with the intent of focusing primarily upon the DIVA-5 interview at that time.       P:    Discussed various factors related to the development and maintenance of  anxiety and stress (including biological, cognitive, behavioral, and environmental factors), Trained in strategies for increasing balanced thinking, Trained in relaxation strategies, Discussed self-care (sleep, nutrition, rewarding activities, social support, exercise), Discussed and problem-solved barriers in adhering to behavioral change plan, Motivational Interviewing to increase patient confidence and compliance with adhering to behavioral change plan, Motivational Interviewing to determine importance and readiness for change, Discussed potential barriers to change, Established rapport, Conducted functional assessment, [place]-setting to identify pt's primary goals for [facility] visit / overall health, Supportive techniques, Provided Psychoeducation re: mind-body connection, CBT to target cognitive distortions and Identified maladaptive thoughts    Patient Plan:  1) Take a look at the workbook chapter you were provided and keep working through any elements that you haven't yet finished. Patient to return in 1 week(s) for follow-up. All questions about treatment plan answered. Patient instructed to call the crisis line and/or proceed to emergency room if suicidal or homicidal ideations occur outside of clinic hours and crisis management skills do not provide relief. Patient stated understanding and is agreeable to treatment and crisis plan.     (Please note that portions of this note were completed with a voice recognition program. Efforts were made to edit the dictations but occasionally words are mis-transcribed.)    Provider Signature:  Electronically signed by Grady Chilel PSYD on 6/15/2021 at 1:31 PM      Pursuant to the emergency declaration under the [facility], 1135 waiver authority and the [employer] and [employer] Act, this Virtual Visit was conducted, with patient's consent, to reduce the patient's risk of exposure to COVID-19 and provide continuity of care for an

## 2021-06-22 ENCOUNTER — VIRTUAL VISIT (OUTPATIENT)
Dept: BEHAVIORAL/MENTAL HEALTH | Age: 26
End: 2021-06-22
Payer: COMMERCIAL

## 2021-06-22 DIAGNOSIS — R41.844 EXECUTIVE FUNCTION DEFICIT: ICD-10-CM

## 2021-06-22 DIAGNOSIS — F41.9 ANXIETY DISORDER, UNSPECIFIED TYPE: Primary | ICD-10-CM

## 2021-06-22 PROCEDURE — 90834 PSYTX W PT 45 MINUTES: CPT | Performed by: COUNSELOR

## 2021-06-22 NOTE — PSYCHOTHERAPY
TELEHEALTH Consultation/Psychotherapy-- Audio/Visual   (During CCQRF-92 public health emergency)  Sanjuanita Akhtar. Fortunato Calvo Psy.D. Visit Date:  6/22/2021    Patient:  Shana Arango  YOB: 1995  Chief Complaint:  Follow-up    Duration of session:  46 minutes    Patient Location:   Patient home [place], [place])    Provider Location:   [place], [place]    This virtual visit was conducted via interactive/real-time audio/video. S:     Patient requested audio/video appointment in lieu of in-person contact to reduce COVID-19 exposure risk. Visit was conducted via secure weblink through doxy. me. Patient confirmed identity via name and birthdate, and expressed understanding of potential limits of confidentiality due to format. Patient presented alone for appointment and engaged readily. Patient reported feeling better than at time of last contact, after talking further with her manager and processing information discussed during her last appointment. Patient indicated that the information from both of the sources had been helpful to her in resolving her \"existential crisis\". Patient discussed her situation at a change point in her life and how such self reflection was common at these times, rather than a cause for embarrassment. Patient continued work on the DIVA-5 structured interview for the remainder of her appointment. Patient reviewed her use of self-care and coping strategies. O:    Appearance    Patient presents as alert, oriented, and cooperative  Appetite normal  Sleep disturbance Yes  Loss of pleasure Yes  Speech    spontaneous, rapid and interrupting  Mood    Anxious  Affect    normal affect  Thought Process    Circumstantial with occasional full tangentiality  Insight    Good  Judgment    Noted impulsivity, capable of appropriate judgment  Memory    recent and remote memory intact  Suicide Assessment    no suicidal ideation      A:    1. Anxiety disorder, unspecified type    2. Executive function deficit        Patient continues to report reduction in her anxiety symptoms and is taking ongoing steps to manage her self-care and physical health appropriately. Patient is adjusting well to the recent increase in stress associated with her graduation and initiation of a second job. Patient continues to work to improve her utilization of self-care and proactive coping technique usage. Patient was able to complete much of the first half of the DIVA-5 structured interview today, and plans to continue progressing through the form at her next contact. Patient is recommended to continue working on provided workbook modules regarding anxiety attacks, to maintain medication compliance, to follow-up with her PCP as directed, and to follow-up with behavioral health as needed. Patient is currently scheduled to return for follow-up in approximately 10 days, with the intent of focusing primarily upon the DIVA-5 interview at that time.       P:    Discussed various factors related to the development and maintenance of  anxiety and stress (including biological, cognitive, behavioral, and environmental factors), Trained in strategies for increasing balanced thinking, Trained in relaxation strategies, Discussed self-care (sleep, nutrition, rewarding activities, social support, exercise), Discussed and problem-solved barriers in adhering to behavioral change plan, Motivational Interviewing to increase patient confidence and compliance with adhering to behavioral change plan, Motivational Interviewing to determine importance and readiness for change, Discussed potential barriers to change, Established rapport, Conducted functional assessment, [place]-setting to identify pt's primary goals for [address] visit / overall health, Supportive techniques, Provided Psychoeducation re: mind-body connection, CBT to target cognitive distortions and Identified maladaptive thoughts    Patient Plan:  1) Take a look at the workbook chapter you were provided and keep working through any elements that you haven't yet finished. Patient to return in 1-2 week(s) for follow-up. All questions about treatment plan answered. Patient instructed to call the crisis line and/or proceed to emergency room if suicidal or homicidal ideations occur outside of clinic hours and crisis management skills do not provide relief. Patient stated understanding and is agreeable to treatment and crisis plan. (Please note that portions of this note were completed with a voice recognition program. Efforts were made to edit the dictations but occasionally words are mis-transcribed.)    Provider Signature:  Electronically signed by Evan Thomason PSYD on 6/22/2021 at 1:17 PM      Pursuant to the emergency declaration under the [facility] waiver authority and the [employer] and [employer] Act, this Virtual Visit was conducted, with patient's consent, to reduce the patient's risk of exposure to COVID-19 and provide continuity of care for an established patient. Services were provided through a video synchronous discussion virtually to substitute for in-person clinic visit.

## 2021-06-22 NOTE — PATIENT INSTRUCTIONS
1) Take a look at the workbook chapter you were provided and keep working through any elements that you haven't yet finished.
ASA of 4, 4E, 5 or 5E

## 2021-07-02 ENCOUNTER — VIRTUAL VISIT (OUTPATIENT)
Dept: BEHAVIORAL/MENTAL HEALTH | Age: 26
End: 2021-07-02
Payer: COMMERCIAL

## 2021-07-02 DIAGNOSIS — F41.9 ANXIETY DISORDER, UNSPECIFIED TYPE: Primary | ICD-10-CM

## 2021-07-02 DIAGNOSIS — R41.844 EXECUTIVE FUNCTION DEFICIT: ICD-10-CM

## 2021-07-02 PROCEDURE — 90837 PSYTX W PT 60 MINUTES: CPT | Performed by: COUNSELOR
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
## 2021-07-16 ENCOUNTER — VIRTUAL VISIT (OUTPATIENT)
Dept: BEHAVIORAL/MENTAL HEALTH | Age: 26
End: 2021-07-16
Payer: COMMERCIAL

## 2021-07-16 DIAGNOSIS — F41.9 ANXIETY DISORDER, UNSPECIFIED TYPE: Primary | ICD-10-CM

## 2021-07-16 DIAGNOSIS — R41.844 EXECUTIVE FUNCTION DEFICIT: ICD-10-CM

## 2021-07-16 PROCEDURE — 90837 PSYTX W PT 60 MINUTES: CPT | Performed by: COUNSELOR

## 2021-07-16 NOTE — PSYCHOTHERAPY
TELEHEALTH Consultation/Psychotherapy-- Audio/Visual   (During USYRK-38 public health emergency)  Roxanna Kocher. Brianna Pulido Psy.D. Visit Date:  7/16/2021    Patient:  Tiffany Rosen  YOB: 1995  Chief Complaint:  Follow-up    Duration of session:  58 minutes    Patient Location:   Patient home [place], [facility])    Provider Location:   [place], [place]    This virtual visit was conducted via interactive/real-time audio/video. S:     Patient requested audio/video appointment in lieu of in-person contact to reduce COVID-19 exposure risk. Visit was conducted via secure weblink through doxy. me. Patient confirmed identity via name and birthdate, and expressed understanding of potential limits of confidentiality due to format. Patient presented alone for appointment and engaged readily. Patient reported that she had begun experiencing panic attacks once again, although she had successfully been able to catch most in the beginning stages and had been able to assist herself in preventing their further progression. Patient discussed factors that may have contributed to her symptom recurrence, and identified that she had been experiencing increased difficulty within one of her work places. Patient reported that she had recognized increased bitterness and feelings of overload when working at the location, due to ongoing issues with supports and resources for employees as well as a higher volume of challenging customers. Patient reported that she had chosen to leave the position in question in 1 month's time and had already submitted her resignation. Patient processed her emotional response to this decision, and was able to identify that it would have been an appropriate decision for her independent of her anxiety status.   Patient processed her continued frustration with her 's friends following the incident that had been discussed at last contact and evaluated her perceived options in addressing the situation or letting it go. Patient discussed her history of difficulty releasing grudges and discussed the utility that this pattern has had in buffering negative emotions that she has experienced about herself following challenging situations. Patient discussed additional recollections from her childhood and ways in which she has come to understand those incidents have helped to shape her current behaviors. Patient continued discussion of elements of the DIVA-5 structured interview and reviewed her use of self-care and coping strategies. O:    Appearance    Patient presents as alert, oriented, and cooperative  Appetite normal  Sleep disturbance Yes  Loss of pleasure Yes  Speech    spontaneous, rapid and interrupting  Mood    Anxious  Affect    normal affect  Thought Process    Circumstantial with occasional full tangentiality  Insight    Good  Judgment    Noted impulsivity, capable of appropriate judgment  Memory    recent and remote memory intact  Suicide Assessment    no suicidal ideation      A:    1. Anxiety disorder, unspecified type    2. Executive function deficit        Patient is reporting a recent exacerbation in her anxiety symptoms following increasing dissatisfaction at one of her 2 jobs. Patient has opted to resign from the job in question, which does appear to have been the best decision for her given the factors within the job that have been contributing to her acute increase in stress. Patient does have a promising opportunity for advancement at her other place of employment and continues to adjust well to the circumstances there. Patient continues to address her usage of self-care and coping techniques, and was able to successfully arrest several early-stage panic attacks before they could progress to a more severe state.   Patient made limited progress on the DIVA-5 structured interview today due to her discussion of other concerns, but is continuing to move through the material. treatment plan answered. Patient instructed to call the crisis line and/or proceed to emergency room if suicidal or homicidal ideations occur outside of clinic hours and crisis management skills do not provide relief. Patient stated understanding and is agreeable to treatment and crisis plan. (Please note that portions of this note were completed with a voice recognition program. Efforts were made to edit the dictations but occasionally words are mis-transcribed.)    Provider Signature:  Electronically signed by Carmen Fan PSYD on 7/16/2021 at 1:05 PM      Pursuant to the emergency declaration under the [facility] waiver authority and the [employer] Act, this Virtual Visit was conducted, with patient's consent, to reduce the patient's risk of exposure to COVID-19 and provide continuity of care for an established patient. Services were provided through a video synchronous discussion virtually to substitute for in-person clinic visit.

## 2021-07-27 ENCOUNTER — VIRTUAL VISIT (OUTPATIENT)
Dept: BEHAVIORAL/MENTAL HEALTH | Age: 26
End: 2021-07-27
Payer: COMMERCIAL

## 2021-07-27 DIAGNOSIS — F41.9 ANXIETY DISORDER, UNSPECIFIED TYPE: Primary | ICD-10-CM

## 2021-07-27 DIAGNOSIS — R41.844 EXECUTIVE FUNCTION DEFICIT: ICD-10-CM

## 2021-07-27 PROCEDURE — 90837 PSYTX W PT 60 MINUTES: CPT | Performed by: COUNSELOR

## 2021-07-27 NOTE — PSYCHOTHERAPY
TELEHEALTH Consultation/Psychotherapy-- Audio/Visual   (During GULPQ-41 public health emergency)  Catalina Hendricks. Robert Cherry Psy.D. Visit Date:  7/27/2021    Patient:  Claude Bob  YOB: 1995  Chief Complaint:  Follow-up    Duration of session:  59 minutes    Patient Location:   Patient home [place], [place])    Provider Location:   [place], [place]    This virtual visit was conducted via interactive/real-time audio/video. S:     Patient requested audio/video appointment in lieu of in-person contact to reduce COVID-19 exposure risk. Visit was conducted via secure weblink through doxy. me. Patient confirmed identity via name and birthdate, and expressed understanding of potential limits of confidentiality due to format. Patient presented alone for appointment and engaged readily. Patient reported that she was feeling improved overall, and was experiencing reduced stress associated with her social network and home life, after her  had been able to successfully resolve the conflict with their friends in a way that was satisfying to both patient and himself. Patient processed her feeling of relief at seeing the subsequent improvement in her 's condition. Patient reported that she had experienced a brief window of vague suicidal ideation over the previous week, but indicated that she had been experiencing significantly elevated stress levels at that time and was feeling overwhelmed by the associated circumstances. Patient denied active suicidal ideation or planning/intent. Patient processed her feelings of guilt/failure for having the passive death wish ideation and discussed the common nature of this sort of thought as a form of attempted problem-solving during intense stress. Patient discussed her history of suicidal ideation and self-harm, and noted that her experiences with these were indeed largely stress driven rather than associated with a true desire to die.   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 Patient discussed her perceived pros and cons of the potential position and discussed her overall employment goals. Patient additionally discussed her progress in locating independent housing. Patient reported experiencing increasing difficulties with her menstrual cycle and noted the frequency with which she experiences anxiety about a possible return of the cyst and subsequent DVT that she had previously dealt with. Patient continued discussion of elements of the DIVA-5 structured interview and reviewed her use of self-care and coping strategies. O:    Appearance    Patient presents as alert, oriented, and cooperative  Appetite normal  Sleep disturbance Yes  Loss of pleasure Yes  Speech    spontaneous, rapid and interrupting  Mood    Anxious  Affect    normal affect  Thought Process    Circumstantial with occasional full tangentiality  Insight    Good  Judgment    Noted impulsivity, capable of appropriate judgment  Memory    recent and remote memory intact  Suicide Assessment    recent brief passive death wish, no active ideation/plan/intent      A:    1. Anxiety disorder, unspecified type    2. Executive function deficit        Patient is reporting overall improvement in her anxiety symptoms following a reduction of interpersonal stress in her immediate situation, but did acknowledge experiencing a brief period of passive suicidal thoughts without active ideation/planning or intent. Patient accepted the reframe of these sorts of thoughts as a warning sign of excessive stress well, and was able to engage in constructive discussion regarding how to manage future such incidents with a minimum of self judgment or stigmatization. Patient continues to make progress in her job search efforts as well as locating independent housing.   Patient continues to address her usage of self-care and coping techniques, and is making ongoing improvement. Patient made limited progress on the DIVA-5 structured interview today due to her discussion of other concerns, but is continuing to move through the material.  Patient is recommended to continue working on provided workbook modules regarding anxiety attacks, to maintain medication compliance, to follow-up with her PCP as directed, to follow-up with her OB/GYN regarding reported menstrual concerns, and to follow-up with behavioral health as needed. Patient has requested to schedule follow-up in 3-4 weeks in order to accommodate upcoming unpredictability within her work schedule, but is aware that she is able to reach out as needed for additional information or support prior to that scheduled contact. P:    Discussed various factors related to the development and maintenance of  anxiety and stress (including biological, cognitive, behavioral, and environmental factors), Trained in strategies for increasing balanced thinking, Trained in relaxation strategies, Discussed self-care (sleep, nutrition, rewarding activities, social support, exercise), Discussed and problem-solved barriers in adhering to behavioral change plan, Motivational Interviewing to increase patient confidence and compliance with adhering to behavioral change plan, Motivational Interviewing to determine importance and readiness for change, Discussed potential barriers to change, Established rapport, Conducted functional assessment, [place]-setting to identify pt's primary goals for [facility] visit / overall health, Supportive techniques, Provided Psychoeducation re: mind-body connection, CBT to target cognitive distortions and Identified maladaptive thoughts    Patient Plan:  1) Take a look at the workbook chapter you were provided and keep working through any elements that you haven't yet finished. Patient to return in 3-4 week(s) for follow-up. All questions about treatment plan answered.  Patient

## 2021-08-24 ENCOUNTER — VIRTUAL VISIT (OUTPATIENT)
Dept: BEHAVIORAL/MENTAL HEALTH | Age: 26
End: 2021-08-24
Payer: COMMERCIAL

## 2021-08-24 DIAGNOSIS — R41.844 EXECUTIVE FUNCTION DEFICIT: ICD-10-CM

## 2021-08-24 DIAGNOSIS — F41.9 ANXIETY DISORDER, UNSPECIFIED TYPE: Primary | ICD-10-CM

## 2021-08-24 PROCEDURE — 90837 PSYTX W PT 60 MINUTES: CPT | Performed by: COUNSELOR
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
## 2021-10-06 ENCOUNTER — OFFICE VISIT (OUTPATIENT)
Dept: PRIMARY CARE CLINIC | Age: 26
End: 2021-10-06
Payer: COMMERCIAL

## 2021-10-06 VITALS
HEART RATE: 68 BPM | HEIGHT: 63 IN | WEIGHT: 193 LBS | SYSTOLIC BLOOD PRESSURE: 110 MMHG | OXYGEN SATURATION: 99 % | DIASTOLIC BLOOD PRESSURE: 70 MMHG | BODY MASS INDEX: 34.2 KG/M2 | TEMPERATURE: 96.8 F

## 2021-10-06 DIAGNOSIS — H66.001 NON-RECURRENT ACUTE SUPPURATIVE OTITIS MEDIA OF RIGHT EAR WITHOUT SPONTANEOUS RUPTURE OF TYMPANIC MEMBRANE: Primary | ICD-10-CM

## 2021-10-06 PROCEDURE — 99213 OFFICE O/P EST LOW 20 MIN: CPT | Performed by: NURSE PRACTITIONER

## 2021-10-06 RX ORDER — AZITHROMYCIN 250 MG/1
TABLET, FILM COATED ORAL
Qty: 1 PACKET | Refills: 0 | Status: SHIPPED | OUTPATIENT
Start: 2021-10-06 | End: 2022-06-07

## 2021-10-06 SDOH — ECONOMIC STABILITY: FOOD INSECURITY: WITHIN THE PAST 12 MONTHS, YOU WORRIED THAT YOUR FOOD WOULD RUN OUT BEFORE YOU GOT MONEY TO BUY MORE.: NEVER TRUE

## 2021-10-06 SDOH — ECONOMIC STABILITY: FOOD INSECURITY: WITHIN THE PAST 12 MONTHS, THE FOOD YOU BOUGHT JUST DIDN'T LAST AND YOU DIDN'T HAVE MONEY TO GET MORE.: NEVER TRUE

## 2021-10-06 ASSESSMENT — ENCOUNTER SYMPTOMS
SORE THROAT: 0
DIARRHEA: 1
VOMITING: 0
RESPIRATORY NEGATIVE: 1
COUGH: 0
ABDOMINAL PAIN: 0
RHINORRHEA: 0

## 2021-10-06 ASSESSMENT — SOCIAL DETERMINANTS OF HEALTH (SDOH): HOW HARD IS IT FOR YOU TO PAY FOR THE VERY BASICS LIKE FOOD, HOUSING, MEDICAL CARE, AND HEATING?: SOMEWHAT HARD

## 2021-10-07 NOTE — PROGRESS NOTES
back: Normal range of motion and neck supple. Lymphadenopathy:      Cervical: No cervical adenopathy. Skin:     General: Skin is warm and dry. Neurological:      General: No focal deficit present. Mental Status: She is alert and oriented to person, place, and time. Psychiatric:         Behavior: Behavior normal.         Thought Content: Thought content normal.       Assessment and Plan:    No results found for this visit on 10/06/21. Diagnosis Orders   1. Non-recurrent acute suppurative otitis media of right ear without spontaneous rupture of tympanic membrane  azithromycin (ZITHROMAX Z-GABBY) 250 MG tablet       Take full course of antibiotic. Take Tylenol or ibuprofen for fever or pain. Keep ear dry and clean. Follow up with PCP if symptoms persist or worsen. The use, risks, benefits, and side effects of prescribed or recommended medications were discussed. All questions were answered and the patient/caregiver voiced understanding. No orders of the defined types were placed in this encounter.         Electronically signed by JEFFERY Iyer CNP on 10/6/21 at 8:09 PM EDT

## 2021-10-07 NOTE — PATIENT INSTRUCTIONS
Patient Education        Ear Infection (Otitis Media): Care Instructions  Overview     An ear infection may start with a cold and affect the middle ear (otitis media). It can hurt a lot. Most ear infections clear up on their own in a couple of days and do not need antibiotics. Also, antibiotics do not work against viruses, which may be the cause of your infection. Regular doses of pain relievers are the best way to reduce your fever and help you feel better. Follow-up care is a key part of your treatment and safety. Be sure to make and go to all appointments, and call your doctor if you are having problems. It's also a good idea to know your test results and keep a list of the medicines you take. How can you care for yourself at home? · Take pain medicines exactly as directed. ? If the doctor gave you a prescription medicine for pain, take it as prescribed. ? If you are not taking a prescription pain medicine, take an over-the-counter medicine, such as acetaminophen (Tylenol), ibuprofen (Advil, Motrin), or naproxen (Aleve). Read and follow all instructions on the label. ? Do not take two or more pain medicines at the same time unless the doctor told you to. Many pain medicines have acetaminophen, which is Tylenol. Too much acetaminophen (Tylenol) can be harmful. · Plan to take a full dose of pain reliever before bedtime. Getting enough sleep will help you get better. · Try a warm, moist washcloth on the ear. It may help relieve pain. · If your doctor prescribed antibiotics, take them as directed. Do not stop taking them just because you feel better. You need to take the full course of antibiotics. When should you call for help? Call your doctor now or seek immediate medical care if:    · You have new or increasing ear pain.     · You have new or increasing pus or blood draining from your ear.     · You have a fever with a stiff neck or a severe headache.    Watch closely for changes in your health, and be sure to contact your doctor if:    · You have new or worse symptoms.     · You are not getting better after taking an antibiotic for 2 days. Where can you learn more? Go to https://[email or web address] org and sign in to your [employer] account. Enter O396 in the [employer] box to learn more about \"Ear Infection (Otitis Media): Care Instructions. \"     If you do not have an account, please click on the \"Sign Up Now\" link. Current as of: December 2, 2020               Content Version: 13.0  © 3035-7042 [employer]wise, Incorporated. Care instructions adapted under license by [facility] ([facility]). If you have questions about a medical condition or this instruction, always ask your healthcare professional. [address] any warranty or liability for your use of this information.

## 2022-06-07 ENCOUNTER — HOSPITAL ENCOUNTER (OUTPATIENT)
Dept: LAB | Age: 27
Discharge: HOME | End: 2022-06-07
Payer: COMMERCIAL

## 2022-06-07 ENCOUNTER — OFFICE VISIT (OUTPATIENT)
Dept: FAMILY MEDICINE CLINIC | Age: 27
End: 2022-06-07
Payer: COMMERCIAL

## 2022-06-07 VITALS
DIASTOLIC BLOOD PRESSURE: 70 MMHG | WEIGHT: 197 LBS | BODY MASS INDEX: 34.91 KG/M2 | SYSTOLIC BLOOD PRESSURE: 124 MMHG | HEIGHT: 63 IN | HEART RATE: 72 BPM

## 2022-06-07 DIAGNOSIS — L68.0 HIRSUTISM: Primary | ICD-10-CM

## 2022-06-07 DIAGNOSIS — L68.0 HIRSUTISM: ICD-10-CM

## 2022-06-07 LAB
SEX HORMONE BINDING GLOBULIN: 35 NMOL/L (ref 30–135)
TESTOSTERONE FREE-NONMALE: 3.4 PG/ML (ref 0.8–7.4)
TESTOSTERONE TOTAL: 20 NG/DL (ref 20–70)
TESTOSTERONE, BIOAVAILABLE: 8.1 NG/DL (ref 2.2–20.6)

## 2022-06-07 PROCEDURE — 84403 ASSAY OF TOTAL TESTOSTERONE: CPT

## 2022-06-07 PROCEDURE — 36415 COLL VENOUS BLD VENIPUNCTURE: CPT

## 2022-06-07 PROCEDURE — 84270 ASSAY OF SEX HORMONE GLOBUL: CPT

## 2022-06-07 PROCEDURE — 99214 OFFICE O/P EST MOD 30 MIN: CPT | Performed by: FAMILY MEDICINE

## 2022-06-07 ASSESSMENT — ENCOUNTER SYMPTOMS
EYES NEGATIVE: 1
ALLERGIC/IMMUNOLOGIC NEGATIVE: 1
RESPIRATORY NEGATIVE: 1
GASTROINTESTINAL NEGATIVE: 1

## 2022-06-07 NOTE — PROGRESS NOTES
Subjective:      Patient ID: Marycarmen Obrien is a 32 y.o. female. HPI  Acute visit to discuss some facial hair noted on her chin. Vaginal dryness endorsed. Started a multivitamin, otherwise no medication changes. Menses mostly regular, not down to the day, but fairly regular and monthly averaging 35-37 days between. Past Medical History:   Diagnosis Date    Abnormal ECG     Abnormal Holter exam     Acne     history of     Anisocoria     history of     Chickenpox     history of      DVT (deep venous thrombosis) ([address]     H/O deep venous thrombosis 08/22/2020    In left leg, due to OCPs    Obesity     Palpitation     Psoriasis     Seasonal allergies     Social anxiety disorder     Swollen tonsil     history of     Vitamin D deficiency 12/14/2018     Past Surgical History:   Procedure Laterality Date    [facility]OSCOPY  2010    [facility]    ENDOSCOPIC ULTRASOUND (LOWER)      OVARIAN CYST REMOVAL Right 09/27/2016    UPPER GASTROINTESTINAL ENDOSCOPY  2010    [facility]    WISDOM TOOTH EXTRACTION       Current Outpatient Medications   Medication Sig Dispense Refill    Multiple Vitamins-Minerals (THERAPEUTIC MULTIVITAMIN-MINERALS) tablet Take 1 tablet by mouth daily       No current facility-administered medications for this visit. Allergies   Allergen Reactions    Effexor Xr [Venlafaxine Hcl]      Nausea and panic attack    Other Other (See Comments)     Purex detergent. Tightness in throat  B[employer] softener. Itching    Ibuprofen Nausea And Vomiting         Review of Systems   Constitutional: Negative. HENT: Negative. Eyes: Negative. Respiratory: Negative. Cardiovascular: Negative. Gastrointestinal: Negative. Endocrine: Negative. Genitourinary: Negative. Negative for menstrual problem. Musculoskeletal: Negative. Skin: Positive for rash (psoriasis). Allergic/Immunologic: Negative. Neurological: Negative. Hematological: Negative. Psychiatric/Behavioral: Negative. Objective:   Physical Exam  Constitutional:       General: She is not in acute distress. Appearance: She is well-developed. HENT:      Head: Normocephalic and atraumatic. Right Ear: External ear normal.      Left Ear: External ear normal.      Mouth/Throat:      Pharynx: No oropharyngeal exudate. Eyes:      General: No scleral icterus. Conjunctiva/sclera: Conjunctivae normal.   Neck:      Thyroid: No thyromegaly. Cardiovascular:      Rate and Rhythm: Normal rate and regular rhythm. Heart sounds: Normal heart sounds. No murmur heard. Pulmonary:      Effort: Pulmonary effort is normal. No respiratory distress. Breath sounds: Normal breath sounds. No wheezing. Abdominal:      General: Bowel sounds are normal. There is no distension. Palpations: Abdomen is soft. Tenderness: There is no abdominal tenderness. There is no rebound. Musculoskeletal:         General: No tenderness. Normal range of motion. Cervical back: Neck supple. Skin:     General: Skin is warm and dry. Findings: No erythema or rash. Comments: Hair on head fairly full and thick. No alopecia. Some darker hair over the chin, upper lip. She is plucking hairs making it harder to verify. Has some hair over the lower back, buttocks. Nothing over chest.     Neurological:      Mental Status: She is alert and oriented to person, place, and time. Psychiatric:         Behavior: Behavior normal.         Thought Content: Thought content normal.         Judgment: Judgment normal.       /70 (Site: Left Upper Arm, Position: Sitting, Cuff Size: Large Adult)   Pulse 72   Ht 5' 3\" (1.6 m)   Wt 197 lb (89.4 kg)   BMI 34.90 kg/m²       Assessment:      Encounter Diagnosis   Name Primary?  Hirsutism Yes     Concern for mostly hair growth on chin and lip. Currently her menses is fairly dependable every 35-37 days. No concerning medication use. History of right ovarian cyst in the past.  No current concerns for pain         Plan:      Consult ob/gyne  Will order total serum testosterone prior to visit.             Mohit Diamond MD

## 2022-07-12 ENCOUNTER — HOSPITAL ENCOUNTER (OUTPATIENT)
Age: 27
Setting detail: SPECIMEN
Discharge: HOME | End: 2022-07-12
Payer: COMMERCIAL

## 2022-07-12 ENCOUNTER — OFFICE VISIT (OUTPATIENT)
Dept: OBGYN | Age: 27
End: 2022-07-12
Payer: COMMERCIAL

## 2022-07-12 VITALS
DIASTOLIC BLOOD PRESSURE: 68 MMHG | HEART RATE: 80 BPM | WEIGHT: 200 LBS | HEIGHT: 63 IN | SYSTOLIC BLOOD PRESSURE: 122 MMHG | BODY MASS INDEX: 35.44 KG/M2

## 2022-07-12 DIAGNOSIS — N92.6 IRREGULAR MENSTRUAL CYCLE: ICD-10-CM

## 2022-07-12 DIAGNOSIS — Z12.4 SCREENING FOR MALIGNANT NEOPLASM OF CERVIX: ICD-10-CM

## 2022-07-12 DIAGNOSIS — Z11.3 ROUTINE SCREENING FOR STI (SEXUALLY TRANSMITTED INFECTION): ICD-10-CM

## 2022-07-12 DIAGNOSIS — E34.9 HORMONE DISORDER: ICD-10-CM

## 2022-07-12 DIAGNOSIS — Z01.419 WOMEN'S ANNUAL ROUTINE GYNECOLOGICAL EXAMINATION: Primary | ICD-10-CM

## 2022-07-12 PROCEDURE — 87591 N.GONORRHOEAE DNA AMP PROB: CPT

## 2022-07-12 PROCEDURE — 87491 CHLMYD TRACH DNA AMP PROBE: CPT

## 2022-07-12 PROCEDURE — 87660 TRICHOMONAS VAGIN DIR PROBE: CPT

## 2022-07-12 PROCEDURE — 87510 GARDNER VAG DNA DIR PROBE: CPT

## 2022-07-12 PROCEDURE — G0145 SCR C/V CYTO,THINLAYER,RESCR: HCPCS

## 2022-07-12 PROCEDURE — 99385 PREV VISIT NEW AGE 18-39: CPT | Performed by: ADVANCED PRACTICE MIDWIFE

## 2022-07-12 PROCEDURE — 87480 CANDIDA DNA DIR PROBE: CPT

## 2022-07-12 ASSESSMENT — ENCOUNTER SYMPTOMS
EYES NEGATIVE: 1
RESPIRATORY NEGATIVE: 1
GASTROINTESTINAL NEGATIVE: 1

## 2022-07-12 NOTE — PROGRESS NOTES
Subjective:      Patient ID: Rosie Baeza  is a 32 y.o. y.o. female. Neena Michel presents today for annual examination. She reports h/o right para-ovarian cystectomy 2017, benign, cyst was 8 cm. She reports irregular menses, 34-39 days in range and bleeding for 4 days. She reports her on her maxi pad change every hour on the heaviest day, and then bleeding reduces to every 2 hr. She reports clots on day two, small and stringy, and cramping she would rate 6-8/10. Midol relieves the pain. She is sexually active and denies pain or bleeding with intercourse. She reports her spouse will only have sex with her maybe every 3-4 months. She reports vaginal dryness on the third week of intercourse. She reports noticing facial hair and it is increasing over the past three months. Moderate hirsutism lower abdomen and legs. Review of Systems   Constitutional: Negative. HENT: Negative. Eyes: Negative. Respiratory: Negative. Cardiovascular: Negative. Gastrointestinal: Negative. Genitourinary: Positive for menstrual problem. Musculoskeletal: Negative. Skin: Negative. Neurological: Negative. Psychiatric/Behavioral: Negative. Breast ROS: negative    Objective:   /68 (Site: Right Upper Arm, Position: Sitting, Cuff Size: Large Adult)   Pulse 80   Ht 5' 3\" (1.6 m)   Wt 200 lb (90.7 kg)   BMI 35.43 kg/m²   Physical Exam  Constitutional:       Appearance: She is well-developed. She is obese. HENT:      Head: Normocephalic and atraumatic. Eyes:      Conjunctiva/sclera: Conjunctivae normal.   Cardiovascular:      Rate and Rhythm: Normal rate and regular rhythm. Heart sounds: Normal heart sounds. Pulmonary:      Effort: Pulmonary effort is normal.      Breath sounds: Normal breath sounds. Chest:   Breasts: Breasts are symmetrical.      Right: No inverted nipple, mass, nipple discharge, skin change or tenderness.       Left: No inverted nipple, mass, nipple discharge, skin change or tenderness. Abdominal:      Palpations: Abdomen is soft. Genitourinary:     General: Normal vulva. Vagina: Normal.      Comments: External genitalia WNL, no lesions noted. Vaginal canal is pink with rugae present and small amount normal appearing discharge. Cervix is nulliparous, freely mobile and nontender. Uterus is NSSAVNT, adnexa are small, freely mobile and nontender. Musculoskeletal:         General: Normal range of motion. Cervical back: Normal range of motion and neck supple. Skin:     General: Skin is warm and dry. Neurological:      Mental Status: She is alert and oriented to person, place, and time. Deep Tendon Reflexes: Reflexes are normal and symmetric. Psychiatric:         Mood and Affect: Mood normal.         Thought Content: Thought content normal.         Sexually active: Yes,, infrequently. Any bleeding or pain with intercourse: Yes, d/t dryness  Last Yearly:  12/2018  Last Pap: 12/2018 Negative EH Partial obscuring cellular data  Last Mammogram: No previous  Do you do self breast exams: Yes  Family H/O Breast, Uterine or Ovarian or Colon Cancer Negative  Assessment:      Diagnosis Orders   1. Women's annual routine gynecological examination     2. Screening for malignant neoplasm of cervix  PAP SMEAR   3. Routine screening for STI (sexually transmitted infection)  C.trachomatis N.gonorrhoeae DNA, Thin Prep    Vaginitis DNA Probe    HIV Screen    T. Pallidum Ab    HERPES PROFILE   4. Hormone disorder     5.  Irregular menstrual cycle  Glucose, Fasting    Hemoglobin A1C    CBC with Auto Differential    Insulin, Total    DHEA-Sulfate    TSH    T4, Free           Plan:      Orders Placed This Encounter   Procedures    C.trachomatis N.gonorrhoeae DNA, Thin Prep     Standing Status:   Future     Standing Expiration Date:   8/12/2022    Vaginitis DNA Probe     Standing Status:   Future     Standing Expiration Date:   8/12/2022    PAP SMEAR     Standing Status: Future     Standing Expiration Date:   9/12/2022     Order Specific Question:   Collection Type     Answer: Thin Prep     Order Specific Question:   Prior Abnormal Pap Test     Answer:   No     Order Specific Question:   Screening or Diagnostic     Answer:   Screening     Order Specific Question:   HPV Requested? Answer:   Yes -  If ASCUS Reflex HPV    Glucose, Fasting     Standing Status:   Future     Standing Expiration Date:   7/12/2023    Hemoglobin A1C     Standing Status:   Future     Standing Expiration Date:   7/12/2023    CBC with Auto Differential     Standing Status:   Future     Standing Expiration Date:   7/12/2023    Insulin, Total     Standing Status:   Future     Standing Expiration Date:   7/12/2023    DHEA-Sulfate     Standing Status:   Future     Standing Expiration Date:   7/12/2023    TSH     Standing Status:   Future     Standing Expiration Date:   7/12/2023    T4, Free     Standing Status:   Future     Standing Expiration Date:   7/12/2023    HIV Screen     Standing Status:   Future     Standing Expiration Date:   7/12/2023    T. Pallidum Ab     Standing Status:   Future     Standing Expiration Date:   7/12/2023    HERPES PROFILE     Standing Status:   Future     Standing Expiration Date:   7/12/2023   Education: discussed marital relationships, counseling, will screen for STI and insulin resistance. Ds'd diet with calcium intake 5637-2489 mg./day and weight bearing exercise 30-50 minutes 3-5 x/weeks. RTO 12 months and prn.

## 2022-07-13 ENCOUNTER — HOSPITAL ENCOUNTER (OUTPATIENT)
Dept: LAB | Age: 27
Discharge: HOME | End: 2022-07-13
Payer: COMMERCIAL

## 2022-07-13 ENCOUNTER — IMMUNIZATION (OUTPATIENT)
Dept: LAB | Age: 27
End: 2022-07-13
Payer: COMMERCIAL

## 2022-07-13 DIAGNOSIS — N92.6 IRREGULAR MENSTRUAL CYCLE: ICD-10-CM

## 2022-07-13 DIAGNOSIS — Z11.3 ROUTINE SCREENING FOR STI (SEXUALLY TRANSMITTED INFECTION): ICD-10-CM

## 2022-07-13 LAB
ABSOLUTE EOS #: 0.14 K/UL (ref 0–0.44)
ABSOLUTE IMMATURE GRANULOCYTE: 0.03 K/UL (ref 0–0.3)
ABSOLUTE LYMPH #: 3.35 K/UL (ref 1.1–3.7)
ABSOLUTE MONO #: 0.59 K/UL (ref 0.1–1.2)
BASOPHILS # BLD: 1 % (ref 0–2)
BASOPHILS ABSOLUTE: 0.1 K/UL (ref 0–0.2)
CANDIDA SPECIES, DNA PROBE: NEGATIVE
CHLAMYDIA BY THIN PREP: NEGATIVE
EOSINOPHILS RELATIVE PERCENT: 2 % (ref 1–4)
ESTIMATED AVERAGE GLUCOSE: 105 MG/DL
GARDNERELLA VAGINALIS, DNA PROBE: NEGATIVE
GLUCOSE FASTING: 101 MG/DL (ref 70–99)
HBA1C MFR BLD: 5.3 % (ref 4–6)
HCT VFR BLD CALC: 42.9 % (ref 36.3–47.1)
HEMOGLOBIN: 14.3 G/DL (ref 11.9–15.1)
HIV AG/AB: NONREACTIVE
IMMATURE GRANULOCYTES: 0 %
INSULIN COMMENT: NORMAL
INSULIN REFERENCE RANGE:: NORMAL
INSULIN: 26.4 MU/L
LYMPHOCYTES # BLD: 44 % (ref 24–43)
MCH RBC QN AUTO: 30.4 PG (ref 25.2–33.5)
MCHC RBC AUTO-ENTMCNC: 33.3 G/DL (ref 25.2–33.5)
MCV RBC AUTO: 91.3 FL (ref 82.6–102.9)
MONOCYTES # BLD: 8 % (ref 3–12)
N. GONORRHOEAE DNA, THIN PREP: NEGATIVE
NRBC AUTOMATED: 0 PER 100 WBC
PDW BLD-RTO: 12.2 % (ref 11.8–14.4)
PLATELET # BLD: 291 K/UL (ref 138–453)
PMV BLD AUTO: 8.5 FL (ref 8.1–13.5)
RBC # BLD: 4.7 M/UL (ref 3.95–5.11)
SEG NEUTROPHILS: 45 % (ref 36–65)
SEGMENTED NEUTROPHILS ABSOLUTE COUNT: 3.45 K/UL (ref 1.5–8.1)
SOURCE: NORMAL
SPECIMEN DESCRIPTION: NORMAL
T. PALLIDUM, IGG: NONREACTIVE
THYROXINE, FREE: 0.99 NG/DL (ref 0.93–1.7)
TRICHOMONAS VAGINALIS DNA: NEGATIVE
TSH SERPL DL<=0.05 MIU/L-ACNC: 2.72 UIU/ML (ref 0.3–5)
WBC # BLD: 7.7 K/UL (ref 3.5–11.3)

## 2022-07-13 PROCEDURE — 82947 ASSAY GLUCOSE BLOOD QUANT: CPT

## 2022-07-13 PROCEDURE — 85025 COMPLETE CBC W/AUTO DIFF WBC: CPT

## 2022-07-13 PROCEDURE — 87389 HIV-1 AG W/HIV-1&-2 AB AG IA: CPT

## 2022-07-13 PROCEDURE — 82627 DEHYDROEPIANDROSTERONE: CPT

## 2022-07-13 PROCEDURE — 86780 TREPONEMA PALLIDUM: CPT

## 2022-07-13 PROCEDURE — 84443 ASSAY THYROID STIM HORMONE: CPT

## 2022-07-13 PROCEDURE — 83036 HEMOGLOBIN GLYCOSYLATED A1C: CPT

## 2022-07-13 PROCEDURE — 83525 ASSAY OF INSULIN: CPT

## 2022-07-13 PROCEDURE — 84439 ASSAY OF FREE THYROXINE: CPT

## 2022-07-13 PROCEDURE — 91306 COVID-19, MODERNA BOOSTER BLUE BORDER, (AGE 18Y+), IM, 50MCG/0.25ML: CPT | Performed by: FAMILY MEDICINE

## 2022-07-13 PROCEDURE — 0064A COVID-19, MODERNA BOOSTER BLUE BORDER, (AGE 18Y+), IM, 50MCG/0.25ML: CPT | Performed by: FAMILY MEDICINE

## 2022-07-13 PROCEDURE — 36415 COLL VENOUS BLD VENIPUNCTURE: CPT

## 2022-07-14 DIAGNOSIS — E28.2 PCOS (POLYCYSTIC OVARIAN SYNDROME): Primary | ICD-10-CM

## 2022-07-14 DIAGNOSIS — E88.81 INSULIN RESISTANCE: ICD-10-CM

## 2022-07-14 LAB — DHEAS (DHEA SULFATE): 84.4 UG/DL (ref 65–380)

## 2022-07-14 RX ORDER — METFORMIN HYDROCHLORIDE 500 MG/1
500 TABLET, EXTENDED RELEASE ORAL
Qty: 30 TABLET | Refills: 5 | Status: SHIPPED | OUTPATIENT
Start: 2022-07-14 | End: 2022-10-18 | Stop reason: SDUPTHER

## 2022-07-14 NOTE — RESULT ENCOUNTER NOTE
Please notify patient lab results. Insulin is mild elevated. Remainder of labs are normal. I would like her to start metformin ER with breakfast daily to help control this. This may also help her menses to become a bit more regular. I would like hehr to have a follow-up appointment in 3 months.  DAWOOD/FLY

## 2022-07-18 LAB — CYTOLOGY REPORT: NORMAL

## 2022-10-18 ENCOUNTER — OFFICE VISIT (OUTPATIENT)
Dept: OBGYN | Age: 27
End: 2022-10-18
Payer: COMMERCIAL

## 2022-10-18 VITALS
HEART RATE: 80 BPM | BODY MASS INDEX: 31.18 KG/M2 | WEIGHT: 176 LBS | HEIGHT: 63 IN | DIASTOLIC BLOOD PRESSURE: 80 MMHG | SYSTOLIC BLOOD PRESSURE: 124 MMHG

## 2022-10-18 DIAGNOSIS — E88.81 INSULIN RESISTANCE: ICD-10-CM

## 2022-10-18 DIAGNOSIS — E28.2 PCOS (POLYCYSTIC OVARIAN SYNDROME): Primary | ICD-10-CM

## 2022-10-18 PROCEDURE — 99214 OFFICE O/P EST MOD 30 MIN: CPT | Performed by: ADVANCED PRACTICE MIDWIFE

## 2022-10-18 RX ORDER — METFORMIN HYDROCHLORIDE 500 MG/1
500 TABLET, EXTENDED RELEASE ORAL
Qty: 30 TABLET | Refills: 5 | Status: SHIPPED | OUTPATIENT
Start: 2022-10-18

## 2022-10-18 ASSESSMENT — ENCOUNTER SYMPTOMS
RESPIRATORY NEGATIVE: 1
GASTROINTESTINAL NEGATIVE: 1
EYES NEGATIVE: 1

## 2022-10-18 ASSESSMENT — PATIENT HEALTH QUESTIONNAIRE - PHQ9
SUM OF ALL RESPONSES TO PHQ QUESTIONS 1-9: 2
SUM OF ALL RESPONSES TO PHQ QUESTIONS 1-9: 2
SUM OF ALL RESPONSES TO PHQ9 QUESTIONS 1 & 2: 2
1. LITTLE INTEREST OR PLEASURE IN DOING THINGS: 1
SUM OF ALL RESPONSES TO PHQ QUESTIONS 1-9: 2
2. FEELING DOWN, DEPRESSED OR HOPELESS: 1
SUM OF ALL RESPONSES TO PHQ QUESTIONS 1-9: 2
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
## 2023-01-22 DIAGNOSIS — E28.2 PCOS (POLYCYSTIC OVARIAN SYNDROME): ICD-10-CM

## 2023-01-22 DIAGNOSIS — E88.81 INSULIN RESISTANCE: ICD-10-CM

## 2023-01-24 ENCOUNTER — HOSPITAL ENCOUNTER (OUTPATIENT)
Age: 28
Discharge: HOME | End: 2023-01-24
Payer: COMMERCIAL

## 2023-01-24 ENCOUNTER — OFFICE VISIT (OUTPATIENT)
Dept: OBGYN | Age: 28
End: 2023-01-24
Payer: COMMERCIAL

## 2023-01-24 VITALS
BODY MASS INDEX: 31.22 KG/M2 | HEART RATE: 74 BPM | WEIGHT: 176.2 LBS | SYSTOLIC BLOOD PRESSURE: 116 MMHG | DIASTOLIC BLOOD PRESSURE: 63 MMHG | HEIGHT: 63 IN

## 2023-01-24 DIAGNOSIS — E28.2 PCOS (POLYCYSTIC OVARIAN SYNDROME): ICD-10-CM

## 2023-01-24 DIAGNOSIS — Z11.3 ROUTINE SCREENING FOR STI (SEXUALLY TRANSMITTED INFECTION): ICD-10-CM

## 2023-01-24 DIAGNOSIS — E88.81 INSULIN RESISTANCE: ICD-10-CM

## 2023-01-24 DIAGNOSIS — E28.2 PCOS (POLYCYSTIC OVARIAN SYNDROME): Primary | ICD-10-CM

## 2023-01-24 LAB
GLUCOSE FASTING: 97 MG/DL (ref 70–99)
INSULIN COMMENT: NORMAL
INSULIN REFERENCE RANGE:: NORMAL
INSULIN: 11.5 MU/L

## 2023-01-24 PROCEDURE — 82947 ASSAY GLUCOSE BLOOD QUANT: CPT

## 2023-01-24 PROCEDURE — 86694 HERPES SIMPLEX NES ANTBDY: CPT

## 2023-01-24 PROCEDURE — 86695 HERPES SIMPLEX TYPE 1 TEST: CPT

## 2023-01-24 PROCEDURE — 86696 HERPES SIMPLEX TYPE 2 TEST: CPT

## 2023-01-24 PROCEDURE — 36415 COLL VENOUS BLD VENIPUNCTURE: CPT

## 2023-01-24 PROCEDURE — 83525 ASSAY OF INSULIN: CPT

## 2023-01-24 PROCEDURE — 99213 OFFICE O/P EST LOW 20 MIN: CPT | Performed by: ADVANCED PRACTICE MIDWIFE

## 2023-01-24 RX ORDER — METFORMIN HYDROCHLORIDE 500 MG/1
500 TABLET, EXTENDED RELEASE ORAL
Qty: 90 TABLET | Refills: 1 | Status: SHIPPED | OUTPATIENT
Start: 2023-01-24

## 2023-01-24 RX ORDER — METFORMIN HYDROCHLORIDE 500 MG/1
TABLET, EXTENDED RELEASE ORAL
Qty: 30 TABLET | Refills: 5 | OUTPATIENT
Start: 2023-01-24

## 2023-01-24 ASSESSMENT — ENCOUNTER SYMPTOMS
RESPIRATORY NEGATIVE: 1
EYES NEGATIVE: 1
GASTROINTESTINAL NEGATIVE: 1

## 2023-01-24 NOTE — PROGRESS NOTES
Subjective:      Patient ID: Sun Pelaez  is a 32 y.o. y.o. female. Rebekah reports her weight is stable, her menses are now monthly and she is bleeding for 3-4 days. She wears pads and requires a pad change every 2 hours for hygiene. She reports no clots and menstrual pain the first day she would rate 4-5/10. She takes midol for the pain and it is relieved. She reports she and her spouse are working on their relationship and things are getting better. Review of Systems   Constitutional: Negative. HENT: Negative. Eyes: Negative. Respiratory: Negative. Cardiovascular: Negative. Gastrointestinal: Negative. Genitourinary:  Positive for menstrual problem. Musculoskeletal: Negative. Skin: Negative. Neurological: Negative. Psychiatric/Behavioral: Negative. Breast ROS: negative    Objective:   /63   Pulse 74   Ht 5' 3\" (1.6 m)   Wt 176 lb 3.2 oz (79.9 kg)   LMP 01/06/2023 (Exact Date)   BMI 31.21 kg/m²   Physical Exam  Constitutional:       Appearance: She is well-developed. HENT:      Head: Normocephalic and atraumatic. Eyes:      Conjunctiva/sclera: Conjunctivae normal.   Cardiovascular:      Rate and Rhythm: Normal rate and regular rhythm. Heart sounds: Normal heart sounds. Pulmonary:      Effort: Pulmonary effort is normal.      Breath sounds: Normal breath sounds. Abdominal:      Palpations: Abdomen is soft. Genitourinary:     Vagina: Normal.      Comments: Pelvic deferred. Musculoskeletal:         General: Normal range of motion. Cervical back: Normal range of motion and neck supple. Skin:     General: Skin is warm and dry. Neurological:      Mental Status: She is alert and oriented to person, place, and time. Deep Tendon Reflexes: Reflexes are normal and symmetric. Psychiatric:         Mood and Affect: Mood normal.         Thought Content: Thought content normal.         Assessment:      Diagnosis Orders   1.  PCOS (polycystic ovarian syndrome)  Glucose, Fasting    Insulin, Total      2. Insulin resistance  Glucose, Fasting    Insulin, Total              Plan:     Orders Placed This Encounter   Procedures    Glucose, Fasting     Standing Status:   Future     Standing Expiration Date:   1/24/2024    Insulin, Total     Standing Status:   Future     Standing Expiration Date:   1/24/2024   Education: continue with low CHO diet, metformin and will check labs today. RTO 6 months for annual and prn.

## 2023-01-25 NOTE — RESULT ENCOUNTER NOTE
Please notify patient lab results WNL. Good results, note improvement with metformin. Continue meds.  DA/CNM

## 2023-01-26 LAB
HERPES SIMPLEX VIRUS 1 IGG: 0.54
HERPES SIMPLEX VIRUS 2 IGG: 0.08
HERPES TYPE 1/2 IGM COMBINED: 0.61

## 2023-05-12 NOTE — ED TRIAGE NOTES
Pt to room #2, ambulatory with c/o \"possible anaphylactic\" reaction due to cough, throat irritation. She also states she may possibly be having a panic attack. Pt is speaking appropriately and in no apparent distress, managing her saliva well. No drooling. Pt also reports dizziness and has been taking Benadryl around the clock for hives. English